# Patient Record
Sex: MALE | Race: BLACK OR AFRICAN AMERICAN | Employment: UNEMPLOYED
[De-identification: names, ages, dates, MRNs, and addresses within clinical notes are randomized per-mention and may not be internally consistent; named-entity substitution may affect disease eponyms.]

---

## 2017-01-24 ENCOUNTER — OFFICE VISIT (OUTPATIENT)
Dept: INTERNAL MEDICINE | Facility: CLINIC | Age: 52
End: 2017-01-24

## 2017-01-24 VITALS
HEART RATE: 108 BPM | SYSTOLIC BLOOD PRESSURE: 126 MMHG | WEIGHT: 220 LBS | RESPIRATION RATE: 20 BRPM | BODY MASS INDEX: 27.35 KG/M2 | HEIGHT: 75 IN | DIASTOLIC BLOOD PRESSURE: 76 MMHG

## 2017-01-24 DIAGNOSIS — F25.9 SCHIZOAFFECTIVE DISORDER, UNSPECIFIED TYPE (HCC): ICD-10-CM

## 2017-01-24 DIAGNOSIS — Z13.9 SCREENING: ICD-10-CM

## 2017-01-24 DIAGNOSIS — Z72.0 TOBACCO ABUSE: ICD-10-CM

## 2017-01-24 DIAGNOSIS — Z79.4 TYPE 2 DIABETES MELLITUS WITH HYPERGLYCEMIA, WITH LONG-TERM CURRENT USE OF INSULIN (HCC): ICD-10-CM

## 2017-01-24 DIAGNOSIS — E11.65 TYPE 2 DIABETES MELLITUS WITH HYPERGLYCEMIA, WITH LONG-TERM CURRENT USE OF INSULIN (HCC): ICD-10-CM

## 2017-01-24 DIAGNOSIS — Z76.89 ENCOUNTER TO ESTABLISH CARE: Primary | ICD-10-CM

## 2017-01-24 DIAGNOSIS — E78.5 HYPERLIPIDEMIA, UNSPECIFIED HYPERLIPIDEMIA TYPE: ICD-10-CM

## 2017-01-24 LAB — GLUCOSE BLD-MCNC: 232 MG/DL

## 2017-01-24 PROCEDURE — G8427 DOCREV CUR MEDS BY ELIG CLIN: HCPCS | Performed by: INTERNAL MEDICINE

## 2017-01-24 PROCEDURE — G8419 CALC BMI OUT NRM PARAM NOF/U: HCPCS | Performed by: INTERNAL MEDICINE

## 2017-01-24 PROCEDURE — G8484 FLU IMMUNIZE NO ADMIN: HCPCS | Performed by: INTERNAL MEDICINE

## 2017-01-24 PROCEDURE — 99203 OFFICE O/P NEW LOW 30 MIN: CPT | Performed by: INTERNAL MEDICINE

## 2017-01-24 PROCEDURE — 3046F HEMOGLOBIN A1C LEVEL >9.0%: CPT | Performed by: INTERNAL MEDICINE

## 2017-01-24 PROCEDURE — 1111F DSCHRG MED/CURRENT MED MERGE: CPT | Performed by: INTERNAL MEDICINE

## 2017-01-24 PROCEDURE — 4004F PT TOBACCO SCREEN RCVD TLK: CPT | Performed by: INTERNAL MEDICINE

## 2017-01-24 RX ORDER — CITALOPRAM 20 MG/1
20 TABLET ORAL DAILY
Qty: 30 TABLET | Refills: 0 | Status: SHIPPED | OUTPATIENT
Start: 2017-01-24

## 2017-01-24 RX ORDER — INSULIN GLARGINE 100 [IU]/ML
50 INJECTION, SOLUTION SUBCUTANEOUS EVERY MORNING
Qty: 1 VIAL | Refills: 3 | Status: SHIPPED | OUTPATIENT
Start: 2017-01-24

## 2017-01-24 RX ORDER — INSULIN GLARGINE 100 [IU]/ML
35 INJECTION, SOLUTION SUBCUTANEOUS NIGHTLY
Qty: 1 VIAL | Refills: 3 | Status: SHIPPED | OUTPATIENT
Start: 2017-01-24

## 2017-01-24 RX ORDER — SIMVASTATIN 20 MG
20 TABLET ORAL EVERY EVENING
Qty: 30 TABLET | Refills: 3 | Status: SHIPPED | OUTPATIENT
Start: 2017-01-24

## 2017-01-24 RX ORDER — ASPIRIN 81 MG/1
81 TABLET ORAL DAILY
Qty: 30 TABLET | Refills: 3 | Status: SHIPPED | OUTPATIENT
Start: 2017-01-24

## 2017-01-24 RX ORDER — OLANZAPINE 10 MG/1
10 TABLET ORAL NIGHTLY
Qty: 30 TABLET | Refills: 0 | Status: SHIPPED | OUTPATIENT
Start: 2017-01-24

## 2017-01-24 RX ORDER — OLANZAPINE 5 MG/1
5 TABLET ORAL DAILY
Qty: 30 TABLET | Refills: 0 | Status: SHIPPED | OUTPATIENT
Start: 2017-01-24

## 2017-01-24 RX ORDER — NICOTINE 21 MG/24HR
1 PATCH, TRANSDERMAL 24 HOURS TRANSDERMAL EVERY 24 HOURS
Qty: 30 PATCH | Refills: 3 | Status: SHIPPED | OUTPATIENT
Start: 2017-01-24 | End: 2018-01-24

## 2017-01-24 ASSESSMENT — PATIENT HEALTH QUESTIONNAIRE - PHQ9
SUM OF ALL RESPONSES TO PHQ QUESTIONS 1-9: 0
2. FEELING DOWN, DEPRESSED OR HOPELESS: 0
SUM OF ALL RESPONSES TO PHQ9 QUESTIONS 1 & 2: 0
1. LITTLE INTEREST OR PLEASURE IN DOING THINGS: 0

## 2017-02-08 ENCOUNTER — OFFICE VISIT (OUTPATIENT)
Dept: BEHAVIORAL/MENTAL HEALTH | Facility: CLINIC | Age: 52
End: 2017-02-08

## 2017-02-08 DIAGNOSIS — F25.9 SCHIZOAFFECTIVE DISORDER, UNSPECIFIED TYPE (HCC): Primary | ICD-10-CM

## 2017-02-08 PROCEDURE — 90791 PSYCH DIAGNOSTIC EVALUATION: CPT | Performed by: PSYCHOLOGIST

## 2017-02-27 ENCOUNTER — OFFICE VISIT (OUTPATIENT)
Dept: BEHAVIORAL/MENTAL HEALTH | Facility: CLINIC | Age: 52
End: 2017-02-27

## 2017-02-27 DIAGNOSIS — F25.9 SCHIZOAFFECTIVE DISORDER, UNSPECIFIED TYPE (HCC): Primary | ICD-10-CM

## 2017-02-27 PROCEDURE — 90832 PSYTX W PT 30 MINUTES: CPT | Performed by: PSYCHOLOGIST

## 2018-11-19 ENCOUNTER — TELEPHONE (OUTPATIENT)
Dept: INTERNAL MEDICINE | Age: 53
End: 2018-11-19

## 2021-07-06 ENCOUNTER — HOSPITAL ENCOUNTER (EMERGENCY)
Age: 56
Discharge: HOME | End: 2021-07-06
Attending: EMERGENCY MEDICINE

## 2021-07-06 ENCOUNTER — APPOINTMENT (OUTPATIENT)
Dept: GENERAL RADIOLOGY | Age: 56
End: 2021-07-06

## 2021-07-06 VITALS
HEIGHT: 73 IN | SYSTOLIC BLOOD PRESSURE: 147 MMHG | OXYGEN SATURATION: 97 % | HEART RATE: 94 BPM | DIASTOLIC BLOOD PRESSURE: 92 MMHG | TEMPERATURE: 98.3 F | RESPIRATION RATE: 16 BRPM | BODY MASS INDEX: 31.25 KG/M2 | WEIGHT: 235.8 LBS

## 2021-07-06 DIAGNOSIS — M79.671 RIGHT FOOT PAIN: Primary | ICD-10-CM

## 2021-07-06 DIAGNOSIS — R52 PAIN: ICD-10-CM

## 2021-07-06 PROCEDURE — 6370000000 HC RX 637 (ALT 250 FOR IP): Performed by: EMERGENCY MEDICINE

## 2021-07-06 PROCEDURE — 73630 X-RAY EXAM OF FOOT: CPT

## 2021-07-06 PROCEDURE — 99283 EMERGENCY DEPT VISIT LOW MDM: CPT

## 2021-07-06 RX ORDER — IBUPROFEN 800 MG/1
800 TABLET ORAL ONCE
Status: COMPLETED | OUTPATIENT
Start: 2021-07-06 | End: 2021-07-06

## 2021-07-06 RX ORDER — METOCLOPRAMIDE 10 MG/1
10 TABLET ORAL ONCE
Status: DISCONTINUED | OUTPATIENT
Start: 2021-07-06 | End: 2021-07-06

## 2021-07-06 RX ORDER — ONDANSETRON 4 MG/1
4 TABLET, ORALLY DISINTEGRATING ORAL ONCE
Status: DISCONTINUED | OUTPATIENT
Start: 2021-07-06 | End: 2021-07-06

## 2021-07-06 RX ORDER — DIPHENHYDRAMINE HCL 25 MG
25 TABLET ORAL ONCE
Status: DISCONTINUED | OUTPATIENT
Start: 2021-07-06 | End: 2021-07-06

## 2021-07-06 RX ORDER — KETOROLAC TROMETHAMINE 30 MG/ML
60 INJECTION, SOLUTION INTRAMUSCULAR; INTRAVENOUS ONCE
Status: DISCONTINUED | OUTPATIENT
Start: 2021-07-06 | End: 2021-07-06

## 2021-07-06 RX ADMIN — IBUPROFEN 800 MG: 800 TABLET, FILM COATED ORAL at 05:59

## 2021-07-06 ASSESSMENT — PAIN SCALES - GENERAL
PAINLEVEL_OUTOF10: 8
PAINLEVEL_OUTOF10: 8

## 2021-07-06 NOTE — ED NOTES
Pt presents to the er c/o right great toe and right second toe pain with no known injury for three months pt right great toe and second right toe are with dry scally skin      Bertha Lance RN  07/06/21 1778

## 2021-07-06 NOTE — ED PROVIDER NOTES
EMERGENCY DEPARTMENT ENCOUNTER    Pt Name: Sofy Siu  MRN: [id number]  [place] 1965  Date of evaluation: 7/6/21  CHIEF COMPLAINT       Chief Complaint   Patient presents with    Foot Pain     right     HISTORY OF PRESENT ILLNESS   Patient is a 53-year-old male who presents to the ED complaining of toe pain x3 months. Pain located medial side of second toe left foot. No trauma. Pain described as tolerable, 3/10. No other issues at this time. No fever, cough, shortness of breath, chest pain. REVIEW OF SYSTEMS     Review of Systems   All other systems reviewed and are negative.     PASTMEDICAL HISTORY     Past Medical History:   Diagnosis Date    Acute kidney injury ([facility] [address]     Type 2 diabetes mellitus with hyperosmolar nonketotic hyperglycemia ([facility] [address] 10/2/2016     SURGICAL HISTORY       Past Surgical History:   Procedure Laterality Date    ARM SURGERY       CURRENT MEDICATIONS       Discharge Medication List as of 7/6/2021  5:24 AM      CONTINUE these medications which have NOT CHANGED    Details   insulin aspart (NOVOLOG FLEXPEN) 100 UNIT/ML injection pen Inject 0-18 Units into the skin 3 times daily (before meals), Disp-5 Pen, R-3      !! insulin glargine (LANTUS) 100 UNIT/ML injection vial Inject 50 Units into the skin every morning, Disp-1 vial, R-3      simvastatin (ZOCOR) 20 MG tablet Take 1 tablet by mouth every evening, Disp-30 tablet, R-3      aspirin EC 81 MG EC tablet Take 1 tablet by mouth daily, Disp-30 tablet, R-3      citalopram (CELEXA) 20 MG tablet Take 1 tablet by mouth daily, Disp-30 tablet, R-0      !! OLANZapine (ZYPREXA) 10 MG tablet Take 1 tablet by mouth nightly, Disp-30 tablet, R-0      !! OLANZapine (ZYPREXA) 5 MG tablet Take 1 tablet by mouth daily, Disp-30 tablet, R-0      !! insulin glargine (LANTUS) 100 UNIT/ML injection vial Inject 35 Units into the skin nightly, Disp-1 vial, R-3      nicotine (NICODERM CQ) 14 MG/24HR Place 1 patch onto the skin every 24 hours, Disp-30 patch, R-3      metFORMIN (GLUCOPHAGE) 1000 MG tablet Take 1 tablet by mouth 2 times daily (with meals), Disp-60 tablet, R-3       !! - Potential duplicate medications found. Please discuss with provider. ALLERGIES     has No Known Allergies. FAMILY HISTORY     has no family status information on file. SOCIAL HISTORY       Social History     Tobacco Use    Smoking status: Current Every Day Smoker     Packs/day: 0.50     Types: Cigarettes    Smokeless tobacco: Never Used   Substance Use Topics    Alcohol use: No    Drug use: No     PHYSICAL EXAM     INITIAL VITALS: BP (!) 147/92   Pulse 94   Temp 98.3 °F (36.8 °C)   Resp 16   Ht 6' 1\" (1.854 m)   Wt 235 lb 12.8 oz (107 kg)   SpO2 97%   BMI 31.11 kg/m²    Physical Exam  HENT:      Head: Normocephalic. Right Ear: External ear normal.      Left Ear: External ear normal.      Nose: Nose normal.   Eyes:      Conjunctiva/sclera: Conjunctivae normal.   Cardiovascular:      Rate and Rhythm: Normal rate. Pulmonary:      Effort: Pulmonary effort is normal.   Abdominal:      General: Abdomen is flat. Skin:     General: Skin is dry. Comments: Hypertrophied area of skin medial aspect second toe right foot. Neurological:      Mental Status: He is alert. Mental status is at baseline. Psychiatric:         Mood and Affect: Mood normal.         Behavior: Behavior normal.         MEDICAL DECISION MAKING:   The patient is hemodynamically stable, afebrile, nontoxic-appearing. Physical exam notable for hypertrophied area of skin medial aspect second toe right foot without signs of trauma, skin breaks, infection  Based on history and exam likely chronic skin irritation from footwear.   ED plan for x-ray, ibuprofen, podiatry follow-up           DIAGNOSTIC RESULTS   EKG:All EKG's are interpreted by the Emergency Department Physician who either signs or Co-signs this chart in the absence of a cardiologist.        RADIOLOGY:All plain film, CT, MRI, and formal ultrasound images (except ED bedside ultrasound) are read by the radiologist, see reports below, unless otherwisenoted in MDM or here. XR FOOT RIGHT (MIN 3 VIEWS)   Final Result   1. Mild age-indeterminate lateral subluxation of the 1st metatarsal base. No   Lisfranc interval widening. If clinically indicated further evaluation could   be obtained with CT or MRI. 2. No fracture identified. LABS: All lab results were reviewed by myself, and all abnormals are listed below. Labs Reviewed - No data to display    EMERGENCY DEPARTMENTCOURSE:   Patient did well in the ED. X-ray unremarkable. Patient given referral to podiatry. Vitals:    Vitals:    07/06/21 0440   BP: (!) 147/92   Pulse: 94   Resp: 16   Temp: 98.3 °F (36.8 °C)   SpO2: 97%   Weight: 235 lb 12.8 oz (107 kg)   Height: 6' 1\" (1.854 m)       The patient was given the following medications while in the emergency department:  Orders Placed This Encounter   Medications    DISCONTD: ondansetron (ZOFRAN-ODT) disintegrating tablet 4 mg    DISCONTD: metoclopramide (REGLAN) tablet 10 mg    DISCONTD: diphenhydrAMINE (BENADRYL) tablet 25 mg    DISCONTD: ketorolac (TORADOL) injection 60 mg    ibuprofen (ADVIL;MOTRIN) tablet 800 mg     CONSULTS:  None    FINAL IMPRESSION      1. Right foot pain    2.  Pain          DISPOSITION/PLAN   DISPOSITION        PATIENT REFERRED TO:  Joann Heard, [facility] [address] [place] [address] ([facility])  [phone]    In 2 days      DISCHARGE MEDICATIONS:  Discharge Medication List as of 7/6/2021  5:24 AM        La Nena Ryan MD  Attending Emergency Physician                    Jearline Fleischer, MD  07/06/21 5290       Jearline Fleischer, MD  07/06/21 2798

## 2022-01-29 ENCOUNTER — HOSPITAL ENCOUNTER (EMERGENCY)
Age: 57
Discharge: HOME | End: 2022-01-29
Attending: EMERGENCY MEDICINE

## 2022-01-29 VITALS
SYSTOLIC BLOOD PRESSURE: 157 MMHG | HEART RATE: 114 BPM | RESPIRATION RATE: 20 BRPM | DIASTOLIC BLOOD PRESSURE: 87 MMHG | TEMPERATURE: 97.9 F | HEIGHT: 74 IN | OXYGEN SATURATION: 97 % | WEIGHT: 172 LBS | BODY MASS INDEX: 22.07 KG/M2

## 2022-01-29 DIAGNOSIS — K08.89 PAIN, DENTAL: Primary | ICD-10-CM

## 2022-01-29 PROCEDURE — 99283 EMERGENCY DEPT VISIT LOW MDM: CPT

## 2022-01-29 RX ORDER — IBUPROFEN 800 MG/1
800 TABLET ORAL EVERY 8 HOURS PRN
Qty: 20 TABLET | Refills: 0 | Status: SHIPPED | OUTPATIENT
Start: 2022-01-29

## 2022-01-29 RX ORDER — ACETAMINOPHEN AND CODEINE PHOSPHATE 300; 30 MG/1; MG/1
1 TABLET ORAL EVERY 6 HOURS PRN
Qty: 12 TABLET | Refills: 0 | Status: SHIPPED | OUTPATIENT
Start: 2022-01-29 | End: 2022-02-01

## 2022-01-29 RX ORDER — PENICILLIN V POTASSIUM 500 MG/1
500 TABLET ORAL 4 TIMES DAILY
Qty: 40 TABLET | Refills: 0 | Status: SHIPPED | OUTPATIENT
Start: 2022-01-29 | End: 2022-02-08

## 2022-01-29 ASSESSMENT — PAIN DESCRIPTION - LOCATION: LOCATION: TEETH

## 2022-01-29 ASSESSMENT — PAIN DESCRIPTION - ORIENTATION: ORIENTATION: RIGHT;UPPER

## 2022-01-29 ASSESSMENT — ENCOUNTER SYMPTOMS
FACIAL SWELLING: 0
TROUBLE SWALLOWING: 0

## 2022-01-29 ASSESSMENT — PAIN SCALES - GENERAL: PAINLEVEL_OUTOF10: 6

## 2022-01-29 NOTE — ED PROVIDER NOTES
[address] [place] [address] ED  EMERGENCY DEPARTMENT ENCOUNTER      Pt Name: Christy Felipe  MRN: [id number]  [place] 1965  Date of evaluation: 1/29/2022  Provider: Luanne Smith       Chief Complaint   Patient presents with    Dental Pain         HISTORY [place]  (Location/Symptom, Timing/Onset, Context/Setting, Quality, Duration, Modifying Factors, Severity.)   Christy Felipe is a 49 y.o. male who presents to the emergency department by private auto for evaluation of right upper dental pain that started a week ago. Patient states he used to have a dentist but does not have it anymore. Pain is 8 out of 10 described as an aching sensation. Denies fever or chills. Difficulty swallowing. Does have history of diabetes. Nursing Notes were reviewed.     PASTMEDICAL HISTORY     Past Medical History:   Diagnosis Date    Acute kidney [facility] ([facility] [address]     Type 2 diabetes mellitus with hyperosmolar nonketotic hyperglycemia ([facility] [address] 10/2/2016         SURGICAL HISTORY       Past Surgical History:   Procedure Laterality Date    ARM SURGERY           CURRENT MEDICATIONS     Previous Medications    ASPIRIN EC 81 MG EC TABLET    Take 1 tablet by mouth daily    CITALOPRAM (CELEXA) 20 MG TABLET    Take 1 tablet by mouth daily    INSULIN ASPART (NOVOLOG FLEXPEN) 100 UNIT/ML INJECTION PEN    Inject 0-18 Units into the skin 3 times daily (before meals)    INSULIN GLARGINE (LANTUS) 100 UNIT/ML INJECTION VIAL    Inject 50 Units into the skin every morning    INSULIN GLARGINE (LANTUS) 100 UNIT/ML INJECTION VIAL    Inject 35 Units into the skin nightly    METFORMIN (GLUCOPHAGE) 1000 MG TABLET    Take 1 tablet by mouth 2 times daily (with meals)    NICOTINE (NICODERM CQ) 14 MG/24HR    Place 1 patch onto the skin every 24 hours    OLANZAPINE (ZYPREXA) 10 MG TABLET    Take 1 tablet by mouth nightly    OLANZAPINE (ZYPREXA) 5 MG TABLET    Take 1 tablet by mouth daily    SIMVASTATIN (ZOCOR) 20 MG TABLET    Take 1 tablet by mouth every evening       ALLERGIES     Patient has no known allergies. FAMILY HISTORY     No family history on file. SOCIAL HISTORY       Social History     Socioeconomic History    Marital status:      Spouse name: Not on file    Number of children: Not on file    Years of education: Not on file    Highest education level: Not on file   Occupational History    Not on file   Tobacco Use    Smoking status: Current Every Day Smoker     Packs/day: 0.50     Types: Cigarettes    Smokeless tobacco: Never Used   Substance and Sexual Activity    Alcohol use: No    Drug use: No    Sexual activity: Not on file   Other Topics Concern    Not on file   Social History Narrative    Not on file     Social Determinants of Health     Financial Resource Strain:     Difficulty of Paying Living Expenses: Not on file   Food Insecurity:     Worried About Running Out of Food in the Last Year: Not on file    Daysi of Food in the Last Year: Not on file   Transportation Needs:     Lack of Transportation (Medical): Not on file    Lack of Transportation (Non-Medical):  Not on file   Physical Activity:     Days of Exercise per Week: Not on file    Minutes of Exercise per Session: Not on file   Stress:     Feeling of Stress : Not on file   Social Connections:     Frequency of Communication with Friends and Family: Not on file    Frequency of Social Gatherings with Friends and Family: Not on file    Attends [employer] Services: Not on file    Active Member of Clubs or Organizations: Not on file    Attends Club or Organization Meetings: Not on file    Marital Status: Not on file   Intimate Partner Violence:     Fear of Current or Ex-Partner: Not on file    Emotionally Abused: Not on file    Physically Abused: Not on file    Sexually Abused: Not on file   Housing Stability:     Unable to Pay for Housing in the Last Year: Not on file    Number of [place] in the Last normal.           DIAGNOSTIC RESULTS     EKG:All EKG's are interpreted by the Emergency Department Physician who either signs or Co-signs this chart in the absence of a cardiologist.        RADIOLOGY:   Non-plain film images such as CT, Ultrasound and MRI are read by theradiologist. Plain radiographic images are visualized and preliminarily interpreted by the emergency physician with the below findings:        Interpretation per the Radiologist below, if available at the time of this note:    No orders to display         EDBEDSIDE ULTRASOUND:   Performed by Kia Hernandez - none    LABS:  Labs Reviewed - No data to display    All other labs were within normal range or not returned as of this dictation. EMERGENCY DEPARTMENT COURSE andDIFFERENTIAL DIAGNOSIS/MDM:   Patient presents with dental issue as noted above assessment. No drooling. No dental abscess. No facial swelling. Prescriptions written for Tylenol with codeine, Motrin, and Pen-VK. OARRS reviewed. Given referral to dental clinic for follow-up. Return precautions provided. Vitals:    Vitals:    01/29/22 1458   BP: (!) 157/87   Pulse: 114   Resp: 20   Temp: 97.9 °F (36.6 °C)   TempSrc: Oral   SpO2: 97%   Weight: 172 lb (78 kg)   Height: 6' 2\" (1.88 m)         CONSULTS:  None    RES:  Procedures    FINAL IMPRESSION      1. Pain, dental          DISPOSITION/PLAN   DISPOSITION Decision To Discharge 01/29/2022 03:08:36 PM      PATIENT REFERRED TO:   ALETHA ABREU [facility]  [address], [address] [id number]  [phone]  Schedule an appointment as soon as possible for a visit       [facility] ED  [address] [facility]  [phone]    If symptoms worsen      DISCHARGE MEDICATIONS:     New Prescriptions    ACETAMINOPHEN-CODEINE (TYLENOL/CODEINE #3) 300-30 MG PER TABLET    Take 1 tablet by mouth every 6 hours as needed for Pain for up to 3 days. Intended supply: 3 days.  Take lowest dose possible to manage pain IBUPROFEN (ADVIL;MOTRIN) 800 MG TABLET    Take 1 tablet by mouth every 8 hours as needed for Pain    PENICILLIN V POTASSIUM (VEETID) 500 MG TABLET    Take 1 tablet by mouth 4 times daily for 10 days     Electronically signed by CATALINO Quiñonez 1/29/2022 at 3:11 PM           CATALINO Quiñonez CNP  01/29/22 1514

## 2022-01-29 NOTE — ED PROVIDER NOTES
eMERGENCY dEPARTMENT eNCOUnter   Independent Attestation     Pt Name: Alex Aranda  MRN: [id number]  [place] 1965  Date of evaluation: 1/29/22     Alex Aranda is a 49 y.o. male with CC: Dental Pain      This visit was performed by both a physician and an APC. I performed all aspects of the MDM as documented. The care is provided during an unprecedented national emergency due to the novel coronavirus, COVID 19.     Bill Colmenares DO  Attending Emergency Physician                    Eugenio Betancourt DO  01/29/22 8976

## 2022-09-19 ENCOUNTER — HOSPITAL ENCOUNTER (EMERGENCY)
Facility: CLINIC | Age: 57
Discharge: HOME | End: 2022-09-19
Attending: EMERGENCY MEDICINE

## 2022-09-19 VITALS
HEIGHT: 74 IN | OXYGEN SATURATION: 100 % | SYSTOLIC BLOOD PRESSURE: 139 MMHG | TEMPERATURE: 98.6 F | WEIGHT: 180 LBS | HEART RATE: 89 BPM | BODY MASS INDEX: 23.1 KG/M2 | DIASTOLIC BLOOD PRESSURE: 94 MMHG | RESPIRATION RATE: 16 BRPM

## 2022-09-19 DIAGNOSIS — F10.920 ACUTE ALCOHOLIC INTOXICATION WITHOUT COMPLICATION (HCC): Primary | ICD-10-CM

## 2022-09-19 DIAGNOSIS — E11.65 HYPERGLYCEMIA DUE TO DIABETES MELLITUS (HCC): ICD-10-CM

## 2022-09-19 DIAGNOSIS — Z91.14 NONCOMPLIANCE WITH MEDICATION REGIMEN: ICD-10-CM

## 2022-09-19 LAB
ABSOLUTE EOS #: 0.1 K/UL (ref 0–0.4)
ABSOLUTE LYMPH #: 1.8 K/UL (ref 1–4.8)
ABSOLUTE MONO #: 0.7 K/UL (ref 0.1–1.2)
ALBUMIN SERPL-MCNC: 4.2 G/DL (ref 3.5–5.2)
ALBUMIN/GLOBULIN RATIO: 1.8 (ref 1–2.5)
ALP BLD-CCNC: 98 U/L (ref 40–129)
ALT SERPL-CCNC: 36 U/L (ref 5–41)
ANION GAP SERPL CALCULATED.3IONS-SCNC: 12 MMOL/L (ref 9–17)
ANION GAP SERPL CALCULATED.3IONS-SCNC: 17 MMOL/L (ref 9–17)
AST SERPL-CCNC: 22 U/L
BASOPHILS # BLD: 1 % (ref 0–2)
BASOPHILS ABSOLUTE: 0 K/UL (ref 0–0.2)
BETA-HYDROXYBUTYRATE: 0.44 MMOL/L (ref 0.02–0.27)
BILIRUB SERPL-MCNC: 0.4 MG/DL (ref 0.3–1.2)
BUN BLDV-MCNC: 7 MG/DL (ref 6–20)
BUN BLDV-MCNC: 8 MG/DL (ref 6–20)
CALCIUM SERPL-MCNC: 8.9 MG/DL (ref 8.6–10.4)
CALCIUM SERPL-MCNC: 9.3 MG/DL (ref 8.6–10.4)
CHLORIDE BLD-SCNC: 101 MMOL/L (ref 98–107)
CHLORIDE BLD-SCNC: 91 MMOL/L (ref 98–107)
CHP ED QC CHECK: YES
CHP ED QC CHECK: YES
CO2: 20 MMOL/L (ref 20–31)
CO2: 23 MMOL/L (ref 20–31)
CREAT SERPL-MCNC: 0.6 MG/DL (ref 0.7–1.2)
CREAT SERPL-MCNC: 0.8 MG/DL (ref 0.7–1.2)
EOSINOPHILS RELATIVE PERCENT: 2 % (ref 1–4)
GFR AFRICAN AMERICAN: >60 ML/MIN
GFR AFRICAN AMERICAN: >60 ML/MIN
GFR NON-AFRICAN AMERICAN: >60 ML/MIN
GFR NON-AFRICAN AMERICAN: >60 ML/MIN
GFR SERPL CREATININE-BSD FRML MDRD: ABNORMAL ML/MIN/{1.73_M2}
GFR SERPL CREATININE-BSD FRML MDRD: ABNORMAL ML/MIN/{1.73_M2}
GLUCOSE BLD-MCNC: 284 MG/DL
GLUCOSE BLD-MCNC: 284 MG/DL (ref 75–110)
GLUCOSE BLD-MCNC: 350 MG/DL
GLUCOSE BLD-MCNC: 350 MG/DL (ref 75–110)
GLUCOSE BLD-MCNC: 361 MG/DL (ref 70–99)
GLUCOSE BLD-MCNC: 444 MG/DL (ref 75–110)
GLUCOSE BLD-MCNC: 717 MG/DL (ref 70–99)
HCT VFR BLD CALC: 39.4 % (ref 41–53)
HEMOGLOBIN: 13 G/DL (ref 13.5–17.5)
LYMPHOCYTES # BLD: 23 % (ref 24–44)
MCH RBC QN AUTO: 30.1 PG (ref 26–34)
MCHC RBC AUTO-ENTMCNC: 33.1 G/DL (ref 31–37)
MCV RBC AUTO: 91 FL (ref 80–100)
MONOCYTES # BLD: 9 % (ref 2–11)
PDW BLD-RTO: 13.8 % (ref 12.5–15.4)
PLATELET # BLD: 208 K/UL (ref 140–450)
PMV BLD AUTO: 8.3 FL (ref 6–12)
POTASSIUM SERPL-SCNC: 3.9 MMOL/L (ref 3.7–5.3)
POTASSIUM SERPL-SCNC: 4.4 MMOL/L (ref 3.7–5.3)
RBC # BLD: 4.33 M/UL (ref 4.5–5.9)
SEG NEUTROPHILS: 65 % (ref 36–66)
SEGMENTED NEUTROPHILS ABSOLUTE COUNT: 5.1 K/UL (ref 1.8–7.7)
SODIUM BLD-SCNC: 128 MMOL/L (ref 135–144)
SODIUM BLD-SCNC: 136 MMOL/L (ref 135–144)
TOTAL PROTEIN: 6.5 G/DL (ref 6.4–8.3)
WBC # BLD: 7.9 K/UL (ref 3.5–11)

## 2022-09-19 PROCEDURE — 6370000000 HC RX 637 (ALT 250 FOR IP): Performed by: EMERGENCY MEDICINE

## 2022-09-19 PROCEDURE — 85025 COMPLETE CBC W/AUTO DIFF WBC: CPT

## 2022-09-19 PROCEDURE — 82947 ASSAY GLUCOSE BLOOD QUANT: CPT

## 2022-09-19 PROCEDURE — 80053 COMPREHEN METABOLIC PANEL: CPT

## 2022-09-19 PROCEDURE — 82010 KETONE BODYS QUAN: CPT

## 2022-09-19 PROCEDURE — 96361 HYDRATE IV INFUSION ADD-ON: CPT

## 2022-09-19 PROCEDURE — 80048 BASIC METABOLIC PNL TOTAL CA: CPT

## 2022-09-19 PROCEDURE — 2580000003 HC RX 258: Performed by: EMERGENCY MEDICINE

## 2022-09-19 PROCEDURE — 96376 TX/PRO/DX INJ SAME DRUG ADON: CPT

## 2022-09-19 PROCEDURE — 99284 EMERGENCY DEPT VISIT MOD MDM: CPT

## 2022-09-19 PROCEDURE — 96374 THER/PROPH/DIAG INJ IV PUSH: CPT

## 2022-09-19 PROCEDURE — 36415 COLL VENOUS BLD VENIPUNCTURE: CPT

## 2022-09-19 PROCEDURE — 93005 ELECTROCARDIOGRAM TRACING: CPT | Performed by: EMERGENCY MEDICINE

## 2022-09-19 RX ORDER — 0.9 % SODIUM CHLORIDE 0.9 %
1000 INTRAVENOUS SOLUTION INTRAVENOUS ONCE
Status: COMPLETED | OUTPATIENT
Start: 2022-09-19 | End: 2022-09-19

## 2022-09-19 RX ADMIN — SODIUM CHLORIDE 1000 ML: 9 INJECTION, SOLUTION INTRAVENOUS at 21:22

## 2022-09-19 RX ADMIN — INSULIN HUMAN 4 UNITS: 100 INJECTION, SOLUTION PARENTERAL at 19:07

## 2022-09-19 RX ADMIN — SODIUM CHLORIDE 1000 ML: 9 INJECTION, SOLUTION INTRAVENOUS at 18:55

## 2022-09-19 RX ADMIN — Medication 8 UNITS: at 18:06

## 2022-09-19 RX ADMIN — SODIUM CHLORIDE 1000 ML: 9 INJECTION, SOLUTION INTRAVENOUS at 17:16

## 2022-09-19 ASSESSMENT — ENCOUNTER SYMPTOMS
SORE THROAT: 0
SHORTNESS OF BREATH: 0
DIARRHEA: 0
VOMITING: 0

## 2022-09-19 NOTE — ED PROVIDER NOTES
[facility] ED  [address] [facility]  Phone: [phone]        ADDENDUM:      Care of this patient was assumed from Dr. Tim Martinez. The patient was seen for Hyperglycemia  . The patient's initial evaluation and plan have been discussed with the prior provider who initially evaluated the patient. Nursing Notes, Past Medical Hx, Past Surgical Hx, Allergies, were all reviewed. PAST MEDICAL HISTORY    has a past medical history of Acute kidney injury ([facility] [address] and Type 2 diabetes mellitus with hyperosmolar nonketotic hyperglycemia ([facility] [address] SURGICAL HISTORY      has a past surgical history that includes Arm Surgery.     CURRENT MEDICATIONS       Discharge Medication List as of 9/19/2022 10:51 PM        CONTINUE these medications which have NOT CHANGED    Details   ibuprofen (ADVIL;MOTRIN) 800 MG tablet Take 1 tablet by mouth every 8 hours as needed for Pain, Disp-20 tablet, R-0Print      insulin aspart (NOVOLOG FLEXPEN) 100 UNIT/ML injection pen Inject 0-18 Units into the skin 3 times daily (before meals), Disp-5 Pen, R-3      !! insulin glargine (LANTUS) 100 UNIT/ML injection vial Inject 50 Units into the skin every morning, Disp-1 vial, R-3      simvastatin (ZOCOR) 20 MG tablet Take 1 tablet by mouth every evening, Disp-30 tablet, R-3      aspirin EC 81 MG EC tablet Take 1 tablet by mouth daily, Disp-30 tablet, R-3      citalopram (CELEXA) 20 MG tablet Take 1 tablet by mouth daily, Disp-30 tablet, R-0      !! OLANZapine (ZYPREXA) 10 MG tablet Take 1 tablet by mouth nightly, Disp-30 tablet, R-0      !! OLANZapine (ZYPREXA) 5 MG tablet Take 1 tablet by mouth daily, Disp-30 tablet, R-0      !! insulin glargine (LANTUS) 100 UNIT/ML injection vial Inject 35 Units into the skin nightly, Disp-1 vial, R-3      nicotine (NICODERM CQ) 14 MG/24HR Place 1 patch onto the skin every 24 hours, Disp-30 patch, R-3      metFORMIN (GLUCOPHAGE) 1000 MG tablet Take 1 tablet by mouth 2 times daily (with meals), Disp-60 tablet, R-3       !! - Potential duplicate medications found. Please discuss with provider. ALLERGIES     has No Known Allergies.       Diagnostic Results     LABS:   Results for orders placed or performed during the hospital encounter of 09/19/22   CBC with Auto Differential   Result Value Ref Range    WBC 7.9 3.5 - 11.0 k/uL    RBC 4.33 (L) 4.5 - 5.9 m/uL    Hemoglobin 13.0 (L) 13.5 - 17.5 g/dL    Hematocrit 39.4 (L) 41 - 53 %    MCV 91.0 80 - 100 fL    MCH 30.1 26 - 34 pg    MCHC 33.1 31 - 37 g/dL    RDW 13.8 12.5 - 15.4 %    Platelets 943 270 - 431 k/uL    MPV 8.3 6.0 - 12.0 fL    Seg Neutrophils 65 36 - 66 %    Lymphocytes 23 (L) 24 - 44 %    Monocytes 9 2 - 11 %    Eosinophils % 2 1 - 4 %    Basophils 1 0 - 2 %    Segs Absolute 5.10 1.8 - 7.7 k/uL    Absolute Lymph # 1.80 1.0 - 4.8 k/uL    Absolute Mono # 0.70 0.1 - 1.2 k/uL    Absolute Eos # 0.10 0.0 - 0.4 k/uL    Basophils Absolute 0.00 0.0 - 0.2 k/uL   Comprehensive Metabolic Panel   Result Value Ref Range    Glucose 717 (HH) 70 - 99 mg/dL    BUN 8 6 - 20 mg/dL    Creatinine 0.80 0.70 - 1.20 mg/dL    Calcium 9.3 8.6 - 10.4 mg/dL    Sodium 128 (L) 135 - 144 mmol/L    Potassium 4.4 3.7 - 5.3 mmol/L    Chloride 91 (L) 98 - 107 mmol/L    CO2 20 20 - 31 mmol/L    Anion Gap 17 9 - 17 mmol/L    Alkaline Phosphatase 98 40 - 129 U/L    ALT 36 5 - 41 U/L    AST 22 <40 U/L    Total Bilirubin 0.4 0.3 - 1.2 mg/dL    Total Protein 6.5 6.4 - 8.3 g/dL    Albumin 4.2 3.5 - 5.2 g/dL    Albumin/Globulin Ratio 1.8 1.0 - 2.5    GFR Non-African American >60 >60 mL/min    GFR African American >60 >60 mL/min    GFR Comment         Beta-Hydroxybutyrate   Result Value Ref Range    Beta-Hydroxybutyrate 0.44 (H) 0.02 - 0.27 mmol/L   Basic Metabolic Panel   Result Value Ref Range    Glucose 361 (H) 70 - 99 mg/dL    BUN 7 6 - 20 mg/dL    Creatinine 0.60 (L) 0.70 - 1.20 mg/dL    Calcium 8.9 8.6 - 10.4 mg/dL    Sodium 136 135 - 144 mmol/L    Potassium 3.9 3.7 - 5.3 mmol/L Chloride 101 98 - 107 mmol/L    CO2 23 20 - 31 mmol/L    Anion Gap 12 9 - 17 mmol/L    GFR Non-African American >60 >60 mL/min    GFR African American >60 >60 mL/min    GFR Comment         POCT Glucose   Result Value Ref Range    Glucose 350 mg/dL    QC OK? yes    POC Glucose Fingerstick   Result Value Ref Range    POC Glucose 444 (HH) 75 - 110 mg/dL   POC Glucose Fingerstick   Result Value Ref Range    POC Glucose 350 (H) 75 - 110 mg/dL   POCT Glucose   Result Value Ref Range    Glucose 284 mg/dL    QC OK? yes    POC Glucose Fingerstick   Result Value Ref Range    POC Glucose 284 (H) 75 - 110 mg/dL       RADIOLOGY:  No orders to display       RECENT VITALS:  BP: (!) 139/94, Temp: 98.6 °F (37 °C), Heart Rate: 89, Resp: 16     ED Course     The patient was given the following medications:  Orders Placed This Encounter   Medications    0.9 % sodium chloride bolus    insulin regular (HUMULIN R;NOVOLIN R) injection 8 Units    0.9 % sodium chloride bolus    insulin regular (HUMULIN R;NOVOLIN R) injection 4 Units    0.9 % sodium chloride bolus       Medical Decision Making      ED Course as of 09/20/22 0010   Mon Sep 19, 2022   1742 Patient CBC is grossly unremarkable, his beta hydroxybutyrate is only minimally elevated at 0.44. CMP is pending, the glucose is pending on it, however, his sodium is 128 so I suspect this could be hyperglycemia with pseudohyponatremia. Have started with fluids and I will order some insulin [TS]   4352 Twelve lead EKG interpreted by myself:  A 12 lead EKG done at 1722, interpreted by myself, showed a regular rhythm at a rate of 96bpm.  The NE interval was normal.  The QRS complex was normal.  There was no ST segment elevation or depression, T wave inversion not present. QRS progression through precordial leads was slightly delayed. Interpretation: Normal sinus rhythm, no ST segment changes, no pattern consistent with acute ischemia or infarct.  [TS]   8292 Blood glucose 444, continuing to monitor [TS]   1911 Patient signed out to oncoming physician pending repeat labs and reevaluation [TS]      ED Course User Index  [TS] Yumi Gaspar DO       The patient is a 60-year-old male who presents for evaluation after he was found acutely intoxicated and sleeping at work. The patient is a type II diabetic but is noncompliant with his insulin or other diabetic medications. He admits to taking himself off all medications approximately 1 month ago. He has refills at his pharmacy. Blood sugar upon arrival was 717. He was treated with IV fluids and insulin and his blood sugar steadily improved to 284. Initial sodium was slightly hyponatremic but on subsequent BMP his labs have returned to normal.  I suspect the patient's symptoms are secondary to medication noncompliance and acute alcohol intoxication. He is now clinically sober. He is able to ambulate without difficulty. He is alert and oriented x3 and has no focal neurologic deficits. At this time I feel that he is stable for discharge. He was instructed to stop drinking alcohol and to start taking his medications as prescribed. He was instructed to follow-up with the PCP in 1 to 2 days and to return to the ER for worsening symptoms or any other concern. The patient understands that at this time there is no evidence for a more malignant underlying process, but also understands that early in the process of an illness or injury, an emergency department work-up can be falsely reassuring. Routine discharge counseling was given, and the patient understands that worsening, changing or persistent symptoms should prompt a immediate call or follow-up with their primary care physician or return to the emergency department. The importance of appropriate follow-up was also discussed. I have reviewed the disposition diagnosis with the patient. I have answered their questions and given discharge instructions.   They voiced understanding of these instructions and did not have any further questions or complaints. Disposition     FINAL IMPRESSION      1. Acute alcoholic intoxication without complication ([facility] [address]    2. Hyperglycemia due to diabetes mellitus ([facility] [address]    3.  Noncompliance with medication regimen          DISPOSITION/PLAN   DISPOSITION Decision To Discharge 09/19/2022 10:49:45 PM      CONDITION ON DISPOSITION:   Stable    PATIENT REFERRED TO:  Alis Suarez MD  [place] [address]  [phone]    Schedule an appointment as soon as possible for a visit in 1 day      [facility] ED  [address] [place] [id number]  [phone]  Go to   If symptoms worsen    DISCHARGE MEDICATIONS:  Discharge Medication List as of 9/19/2022 10:51 PM                (Please note that portions of this note were completed with a voice recognition program.  Efforts were made to edit the dictations but occasionally words are mis-transcribed.)    Marissa Montgomery DO  Emergency Medicine Physician                  Marissa Montgmoery DO  09/20/22 0010

## 2022-09-19 NOTE — ED PROVIDER NOTES
Physical Exam  Vitals and nursing note reviewed. Constitutional:       General: He is not in acute distress. Appearance: Normal appearance. He is not ill-appearing or toxic-appearing. HENT:      Head: Normocephalic and atraumatic. Nose: Nose normal. No congestion. Mouth/Throat:      Mouth: Mucous membranes are dry. Eyes:      General:         Right eye: No discharge. Left eye: No discharge. Conjunctiva/sclera: Conjunctivae normal.   Cardiovascular:      Rate and Rhythm: Regular rhythm. Tachycardia present. Pulses: Normal pulses. Heart sounds: Normal heart sounds. No murmur heard. Comments: Slight tachycardia  Pulmonary:      Effort: Pulmonary effort is normal. No respiratory distress. Breath sounds: Normal breath sounds. No wheezing. Abdominal:      General: Abdomen is flat. There is no distension. Palpations: Abdomen is soft. There is no pulsatile mass. Tenderness: There is no abdominal tenderness. There is no guarding or rebound. Hernia: A hernia is present. Comments: No pulsatile mass. There is a ventral wall hernia/mass that is quite large and palpable   Musculoskeletal:         General: No deformity or signs of injury. Normal range of motion. Cervical back: Normal range of motion. Skin:     General: Skin is warm and dry. Capillary Refill: Capillary refill takes less than 2 seconds. Findings: No rash. Neurological:      General: No focal deficit present. Mental Status: He is alert and oriented to person, place, and time. Mental status is at baseline. Sensory: No sensory deficit. Motor: No weakness. Comments: Speaking normally. No facial asymmetry. Moving all 4 extremities. Normal gait.     Psychiatric:         Mood and Affect: Mood normal.       EMERGENCY DEPARTMENT COURSE and DIFFERENTIAL DIAGNOSIS/MDM:   Vitals:    Vitals:    09/19/22 1653   BP: 127/77   Pulse: (!) 104   Resp: 16   Temp: 98.6 minimally elevated at 0.44. CMP is pending, the glucose is pending on it, however, his sodium is 128 so I suspect this could be hyperglycemia with pseudohyponatremia. Have started with fluids and I will order some insulin [TS]   1759 Twelve lead EKG interpreted by myself:  A 12 lead EKG done at 1722, interpreted by myself, showed a regular rhythm at a rate of 96bpm.  The CT interval was normal.  The QRS complex was normal.  There was no ST segment elevation or depression, T wave inversion not present. QRS progression through precordial leads was slightly delayed. Interpretation: Normal sinus rhythm, no ST segment changes, no pattern consistent with acute ischemia or infarct.  [TS]   4345 Blood glucose 444, continuing to monitor [TS]   1911 Patient signed out to oncoming physician pending repeat labs and reevaluation [TS]      ED Course User Index  [TS] Alfred Bowen DO                (Please note that portions of this note were completed with a voice recognition program.  Efforts were made to edit the dictations but occasionally words are mis-transcribed.)    Alfred Bowen DO,(electronically signed)  Board Certified Emergency Physician          Alfred Bowen DO  09/19/22 1911

## 2022-09-20 NOTE — DISCHARGE INSTRUCTIONS
Take your medication as indicated and prescribed. Check your blood sugar at minimum 2 - 3 times per day and write down the results to take to your physician    [address] for worsening symptoms, persistent elevated blood sugar, low blood sugar, or if you develop any concerning symptoms such as: high fever not relieved by acetaminophen (Tylenol) and/or ibuprofen (Motrin / Advil), chills, shortness of breath, chest pain, feeling of your heart fluttering or racing, persistent nausea and/or vomiting, vomiting up blood, blood in your stool, numbness, loss of consciousness, weakness or tingling in the arms or legs or change in color of the extremities, changes in mental status, persistent headache, blurry vision, loss of bladder / bowel control, unable to follow up with your physician, or other any other care or concern.

## 2022-09-20 NOTE — ED NOTES
Pt states he is homeless and does not have anyone to pick him up from here. Pt states he has his bike in the front.       Justen Carolina RN  09/19/22 2123

## 2022-09-20 NOTE — ED NOTES
Pt urinated on self while sleeping.  pts bed changed and paper scrubs provided     Jody Davila RN  09/19/22 3914

## 2022-09-21 LAB
EKG ATRIAL RATE: 96 BPM
EKG P AXIS: 50 DEGREES
EKG P-R INTERVAL: 126 MS
EKG Q-T INTERVAL: 344 MS
EKG QRS DURATION: 84 MS
EKG QTC CALCULATION (BAZETT): 434 MS
EKG R AXIS: 50 DEGREES
EKG T AXIS: 64 DEGREES
EKG VENTRICULAR RATE: 96 BPM

## 2022-11-25 ENCOUNTER — APPOINTMENT (OUTPATIENT)
Dept: GENERAL RADIOLOGY | Age: 57
DRG: 854 | End: 2022-11-25
Payer: MEDICAID

## 2022-11-25 ENCOUNTER — HOSPITAL ENCOUNTER (INPATIENT)
Age: 57
LOS: 5 days | Discharge: NURSING, REHAB OR LONG-TERM CARE | DRG: 854 | End: 2022-11-30
Attending: EMERGENCY MEDICINE | Admitting: STUDENT IN AN ORGANIZED HEALTH CARE EDUCATION/TRAINING PROGRAM
Payer: MEDICAID

## 2022-11-25 DIAGNOSIS — L03.115 CELLULITIS OF RIGHT FOOT: ICD-10-CM

## 2022-11-25 DIAGNOSIS — E78.5 HYPERLIPIDEMIA, UNSPECIFIED HYPERLIPIDEMIA TYPE: ICD-10-CM

## 2022-11-25 DIAGNOSIS — E11.65 TYPE 2 DIABETES MELLITUS WITH HYPERGLYCEMIA, WITH LONG-TERM CURRENT USE OF INSULIN (HCC): ICD-10-CM

## 2022-11-25 DIAGNOSIS — F25.0 SCHIZOAFFECTIVE DISORDER, BIPOLAR TYPE (HCC): ICD-10-CM

## 2022-11-25 DIAGNOSIS — M86.271 SUBACUTE OSTEOMYELITIS OF RIGHT FOOT (HCC): ICD-10-CM

## 2022-11-25 DIAGNOSIS — Z59.00 HOMELESS SINGLE PERSON: ICD-10-CM

## 2022-11-25 DIAGNOSIS — R73.9 HYPERGLYCEMIA: Primary | ICD-10-CM

## 2022-11-25 DIAGNOSIS — Z79.4 TYPE 2 DIABETES MELLITUS WITH HYPERGLYCEMIA, WITH LONG-TERM CURRENT USE OF INSULIN (HCC): ICD-10-CM

## 2022-11-25 PROBLEM — A41.9 SEPSIS (HCC): Status: ACTIVE | Noted: 2022-11-25

## 2022-11-25 LAB
ABSOLUTE EOS #: 0 K/UL (ref 0–0.44)
ABSOLUTE IMMATURE GRANULOCYTE: 0 K/UL (ref 0–0.3)
ABSOLUTE LYMPH #: 1.47 K/UL (ref 1.1–3.7)
ABSOLUTE MONO #: 1.63 K/UL (ref 0.1–1.2)
ANION GAP SERPL CALCULATED.3IONS-SCNC: 14 MMOL/L (ref 9–17)
BASOPHILS # BLD: 1 % (ref 0–2)
BASOPHILS ABSOLUTE: 0.16 K/UL (ref 0–0.2)
BUN BLDV-MCNC: 23 MG/DL (ref 6–20)
BUN/CREAT BLD: 23 (ref 9–20)
C-REACTIVE PROTEIN: 196.2 MG/L (ref 0–5)
CALCIUM SERPL-MCNC: 9.7 MG/DL (ref 8.6–10.4)
CHLORIDE BLD-SCNC: 83 MMOL/L (ref 98–107)
CO2: 25 MMOL/L (ref 20–31)
CREAT SERPL-MCNC: 1 MG/DL (ref 0.7–1.2)
EOSINOPHILS RELATIVE PERCENT: 0 % (ref 1–4)
GFR SERPL CREATININE-BSD FRML MDRD: >60 ML/MIN/1.73M2
GLUCOSE BLD-MCNC: 319 MG/DL (ref 75–110)
GLUCOSE BLD-MCNC: 338 MG/DL (ref 75–110)
GLUCOSE BLD-MCNC: 691 MG/DL (ref 70–99)
HCO3 VENOUS: 26.1 MMOL/L (ref 22–29)
HCO3 VENOUS: NORMAL MMOL/L (ref 22–29)
HCT VFR BLD CALC: 41.4 % (ref 40.7–50.3)
HEMOGLOBIN: 14 G/DL (ref 13–17)
IMMATURE GRANULOCYTES: 0 %
LACTIC ACID, SEPSIS: 0.7 MMOL/L (ref 0.5–1.9)
LACTIC ACID, SEPSIS: 0.8 MMOL/L (ref 0.5–1.9)
LACTIC ACID, SEPSIS: 1 MMOL/L (ref 0.5–1.9)
LACTIC ACID, SEPSIS: 1.5 MMOL/L (ref 0.5–1.9)
LYMPHOCYTES # BLD: 9 % (ref 24–43)
MCH RBC QN AUTO: 30 PG (ref 25.2–33.5)
MCHC RBC AUTO-ENTMCNC: 33.8 G/DL (ref 28.4–34.8)
MCV RBC AUTO: 88.7 FL (ref 82.6–102.9)
MONOCYTES # BLD: 10 % (ref 3–12)
NRBC AUTOMATED: 0 PER 100 WBC
O2 SAT, VEN: 100 % (ref 60–85)
O2 SAT, VEN: NORMAL % (ref 60–85)
PCO2, VEN: 37.6 MM HG (ref 41–51)
PCO2, VEN: NORMAL MM HG (ref 41–51)
PDW BLD-RTO: 11.9 % (ref 11.8–14.4)
PH VENOUS: 7.45 (ref 7.32–7.43)
PH VENOUS: NORMAL (ref 7.32–7.43)
PLATELET # BLD: 232 K/UL (ref 138–453)
PMV BLD AUTO: 10.8 FL (ref 8.1–13.5)
PO2, VEN: 203 MM HG (ref 30–50)
PO2, VEN: NORMAL MM HG (ref 30–50)
POSITIVE BASE EXCESS, VEN: 2 (ref 0–3)
POSITIVE BASE EXCESS, VEN: NORMAL (ref 0–3)
POTASSIUM SERPL-SCNC: 5.2 MMOL/L (ref 3.7–5.3)
RBC # BLD: 4.67 M/UL (ref 4.21–5.77)
SEDIMENTATION RATE, ERYTHROCYTE: 71 MM/HR (ref 0–20)
SEG NEUTROPHILS: 80 % (ref 36–65)
SEGMENTED NEUTROPHILS ABSOLUTE COUNT: 13.04 K/UL (ref 1.5–8.1)
SODIUM BLD-SCNC: 122 MMOL/L (ref 135–144)
URIC ACID: 4.7 MG/DL (ref 3.4–7)
WBC # BLD: 16.3 K/UL (ref 3.5–11.3)

## 2022-11-25 PROCEDURE — 6360000002 HC RX W HCPCS: Performed by: INTERNAL MEDICINE

## 2022-11-25 PROCEDURE — 85025 COMPLETE CBC W/AUTO DIFF WBC: CPT

## 2022-11-25 PROCEDURE — 96375 TX/PRO/DX INJ NEW DRUG ADDON: CPT

## 2022-11-25 PROCEDURE — 0QBQ0ZZ EXCISION OF RIGHT TOE PHALANX, OPEN APPROACH: ICD-10-PCS | Performed by: PODIATRIST

## 2022-11-25 PROCEDURE — 80048 BASIC METABOLIC PNL TOTAL CA: CPT

## 2022-11-25 PROCEDURE — 36415 COLL VENOUS BLD VENIPUNCTURE: CPT

## 2022-11-25 PROCEDURE — 2060000000 HC ICU INTERMEDIATE R&B

## 2022-11-25 PROCEDURE — 2580000003 HC RX 258: Performed by: INTERNAL MEDICINE

## 2022-11-25 PROCEDURE — 96365 THER/PROPH/DIAG IV INF INIT: CPT

## 2022-11-25 PROCEDURE — 2580000003 HC RX 258: Performed by: PHYSICIAN ASSISTANT

## 2022-11-25 PROCEDURE — 6370000000 HC RX 637 (ALT 250 FOR IP): Performed by: INTERNAL MEDICINE

## 2022-11-25 PROCEDURE — 82803 BLOOD GASES ANY COMBINATION: CPT

## 2022-11-25 PROCEDURE — 6360000002 HC RX W HCPCS: Performed by: PHYSICIAN ASSISTANT

## 2022-11-25 PROCEDURE — 87040 BLOOD CULTURE FOR BACTERIA: CPT

## 2022-11-25 PROCEDURE — 82947 ASSAY GLUCOSE BLOOD QUANT: CPT

## 2022-11-25 PROCEDURE — 84550 ASSAY OF BLOOD/URIC ACID: CPT

## 2022-11-25 PROCEDURE — 73630 X-RAY EXAM OF FOOT: CPT

## 2022-11-25 PROCEDURE — 93923 UPR/LXTR ART STDY 3+ LVLS: CPT

## 2022-11-25 PROCEDURE — 6370000000 HC RX 637 (ALT 250 FOR IP): Performed by: PHYSICIAN ASSISTANT

## 2022-11-25 PROCEDURE — 87075 CULTR BACTERIA EXCEPT BLOOD: CPT

## 2022-11-25 PROCEDURE — 87205 SMEAR GRAM STAIN: CPT

## 2022-11-25 PROCEDURE — 83605 ASSAY OF LACTIC ACID: CPT

## 2022-11-25 PROCEDURE — 87070 CULTURE OTHR SPECIMN AEROBIC: CPT

## 2022-11-25 PROCEDURE — 99285 EMERGENCY DEPT VISIT HI MDM: CPT

## 2022-11-25 PROCEDURE — 83036 HEMOGLOBIN GLYCOSYLATED A1C: CPT

## 2022-11-25 PROCEDURE — 96372 THER/PROPH/DIAG INJ SC/IM: CPT

## 2022-11-25 PROCEDURE — 86140 C-REACTIVE PROTEIN: CPT

## 2022-11-25 PROCEDURE — 85652 RBC SED RATE AUTOMATED: CPT

## 2022-11-25 PROCEDURE — 86403 PARTICLE AGGLUT ANTBDY SCRN: CPT

## 2022-11-25 RX ORDER — ENOXAPARIN SODIUM 100 MG/ML
40 INJECTION SUBCUTANEOUS DAILY
Status: DISCONTINUED | OUTPATIENT
Start: 2022-11-25 | End: 2022-11-30 | Stop reason: HOSPADM

## 2022-11-25 RX ORDER — SODIUM CHLORIDE 0.9 % (FLUSH) 0.9 %
5-40 SYRINGE (ML) INJECTION EVERY 12 HOURS SCHEDULED
Status: DISCONTINUED | OUTPATIENT
Start: 2022-11-25 | End: 2022-11-30 | Stop reason: HOSPADM

## 2022-11-25 RX ORDER — ACETAMINOPHEN 650 MG/1
650 SUPPOSITORY RECTAL EVERY 6 HOURS PRN
Status: DISCONTINUED | OUTPATIENT
Start: 2022-11-25 | End: 2022-11-30 | Stop reason: HOSPADM

## 2022-11-25 RX ORDER — FAMOTIDINE 20 MG/1
20 TABLET, FILM COATED ORAL 2 TIMES DAILY
Status: DISCONTINUED | OUTPATIENT
Start: 2022-11-25 | End: 2022-11-30 | Stop reason: HOSPADM

## 2022-11-25 RX ORDER — MORPHINE SULFATE 2 MG/ML
1 INJECTION, SOLUTION INTRAMUSCULAR; INTRAVENOUS
Status: DISCONTINUED | OUTPATIENT
Start: 2022-11-25 | End: 2022-11-30 | Stop reason: HOSPADM

## 2022-11-25 RX ORDER — INSULIN GLARGINE 100 [IU]/ML
35 INJECTION, SOLUTION SUBCUTANEOUS NIGHTLY
Status: DISCONTINUED | OUTPATIENT
Start: 2022-11-25 | End: 2022-11-27

## 2022-11-25 RX ORDER — INSULIN LISPRO 100 [IU]/ML
0-4 INJECTION, SOLUTION INTRAVENOUS; SUBCUTANEOUS NIGHTLY
Status: DISCONTINUED | OUTPATIENT
Start: 2022-11-25 | End: 2022-11-30 | Stop reason: HOSPADM

## 2022-11-25 RX ORDER — SODIUM CHLORIDE 9 MG/ML
INJECTION, SOLUTION INTRAVENOUS PRN
Status: DISCONTINUED | OUTPATIENT
Start: 2022-11-25 | End: 2022-11-30 | Stop reason: HOSPADM

## 2022-11-25 RX ORDER — 0.9 % SODIUM CHLORIDE 0.9 %
30 INTRAVENOUS SOLUTION INTRAVENOUS ONCE
Status: COMPLETED | OUTPATIENT
Start: 2022-11-25 | End: 2022-11-25

## 2022-11-25 RX ORDER — SODIUM CHLORIDE 0.9 % (FLUSH) 0.9 %
5-40 SYRINGE (ML) INJECTION EVERY 12 HOURS SCHEDULED
Status: DISCONTINUED | OUTPATIENT
Start: 2022-11-25 | End: 2022-11-30 | Stop reason: SDUPTHER

## 2022-11-25 RX ORDER — SODIUM CHLORIDE 9 MG/ML
INJECTION, SOLUTION INTRAVENOUS CONTINUOUS
Status: DISCONTINUED | OUTPATIENT
Start: 2022-11-25 | End: 2022-11-26

## 2022-11-25 RX ORDER — SODIUM CHLORIDE 9 MG/ML
INJECTION, SOLUTION INTRAVENOUS PRN
Status: DISCONTINUED | OUTPATIENT
Start: 2022-11-25 | End: 2022-11-30 | Stop reason: SDUPTHER

## 2022-11-25 RX ORDER — MORPHINE SULFATE 2 MG/ML
0.5 INJECTION, SOLUTION INTRAMUSCULAR; INTRAVENOUS
Status: DISCONTINUED | OUTPATIENT
Start: 2022-11-25 | End: 2022-11-30 | Stop reason: HOSPADM

## 2022-11-25 RX ORDER — SODIUM CHLORIDE 0.9 % (FLUSH) 0.9 %
5-40 SYRINGE (ML) INJECTION PRN
Status: DISCONTINUED | OUTPATIENT
Start: 2022-11-25 | End: 2022-11-30 | Stop reason: HOSPADM

## 2022-11-25 RX ORDER — SODIUM CHLORIDE 0.9 % (FLUSH) 0.9 %
5-40 SYRINGE (ML) INJECTION PRN
Status: DISCONTINUED | OUTPATIENT
Start: 2022-11-25 | End: 2022-11-30 | Stop reason: SDUPTHER

## 2022-11-25 RX ORDER — OXYCODONE HYDROCHLORIDE AND ACETAMINOPHEN 5; 325 MG/1; MG/1
2 TABLET ORAL EVERY 4 HOURS PRN
Status: DISCONTINUED | OUTPATIENT
Start: 2022-11-25 | End: 2022-11-30 | Stop reason: HOSPADM

## 2022-11-25 RX ORDER — 0.9 % SODIUM CHLORIDE 0.9 %
1000 INTRAVENOUS SOLUTION INTRAVENOUS ONCE
Status: COMPLETED | OUTPATIENT
Start: 2022-11-25 | End: 2022-11-25

## 2022-11-25 RX ORDER — OXYCODONE HYDROCHLORIDE AND ACETAMINOPHEN 5; 325 MG/1; MG/1
1 TABLET ORAL EVERY 4 HOURS PRN
Status: DISCONTINUED | OUTPATIENT
Start: 2022-11-25 | End: 2022-11-30 | Stop reason: HOSPADM

## 2022-11-25 RX ORDER — SODIUM CHLORIDE 9 MG/ML
INJECTION, SOLUTION INTRAVENOUS CONTINUOUS
Status: DISCONTINUED | OUTPATIENT
Start: 2022-11-25 | End: 2022-11-25

## 2022-11-25 RX ORDER — INSULIN LISPRO 100 [IU]/ML
0-16 INJECTION, SOLUTION INTRAVENOUS; SUBCUTANEOUS
Status: DISCONTINUED | OUTPATIENT
Start: 2022-11-25 | End: 2022-11-30 | Stop reason: HOSPADM

## 2022-11-25 RX ORDER — ASPIRIN 81 MG/1
81 TABLET ORAL DAILY
Status: DISCONTINUED | OUTPATIENT
Start: 2022-11-25 | End: 2022-11-30 | Stop reason: HOSPADM

## 2022-11-25 RX ORDER — ACETAMINOPHEN 325 MG/1
650 TABLET ORAL EVERY 6 HOURS PRN
Status: DISCONTINUED | OUTPATIENT
Start: 2022-11-25 | End: 2022-11-30 | Stop reason: HOSPADM

## 2022-11-25 RX ADMIN — SODIUM CHLORIDE: 9 INJECTION, SOLUTION INTRAVENOUS at 17:40

## 2022-11-25 RX ADMIN — CEFEPIME 2000 MG: 2 INJECTION, POWDER, FOR SOLUTION INTRAVENOUS at 19:38

## 2022-11-25 RX ADMIN — INSULIN HUMAN 4 UNITS: 100 INJECTION, SOLUTION PARENTERAL at 17:37

## 2022-11-25 RX ADMIN — SODIUM CHLORIDE 2994 ML: 9 INJECTION, SOLUTION INTRAVENOUS at 20:06

## 2022-11-25 RX ADMIN — FAMOTIDINE 20 MG: 20 TABLET, FILM COATED ORAL at 23:13

## 2022-11-25 RX ADMIN — INSULIN LISPRO 4 UNITS: 100 INJECTION, SOLUTION INTRAVENOUS; SUBCUTANEOUS at 23:27

## 2022-11-25 RX ADMIN — VANCOMYCIN HYDROCHLORIDE 2500 MG: 5 INJECTION, POWDER, LYOPHILIZED, FOR SOLUTION INTRAVENOUS at 15:13

## 2022-11-25 RX ADMIN — INSULIN GLARGINE 35 UNITS: 100 INJECTION, SOLUTION SUBCUTANEOUS at 23:27

## 2022-11-25 RX ADMIN — ENOXAPARIN SODIUM 40 MG: 100 INJECTION SUBCUTANEOUS at 23:14

## 2022-11-25 RX ADMIN — SODIUM CHLORIDE 1000 ML: 9 INJECTION, SOLUTION INTRAVENOUS at 15:12

## 2022-11-25 RX ADMIN — INSULIN HUMAN 10 UNITS: 100 INJECTION, SOLUTION PARENTERAL at 16:20

## 2022-11-25 RX ADMIN — ASPIRIN 81 MG: 81 TABLET, COATED ORAL at 23:13

## 2022-11-25 SDOH — ECONOMIC STABILITY - HOUSING INSECURITY: HOMELESSNESS UNSPECIFIED: Z59.00

## 2022-11-25 ASSESSMENT — ENCOUNTER SYMPTOMS
COLOR CHANGE: 1
GASTROINTESTINAL NEGATIVE: 1
EYES NEGATIVE: 1
RESPIRATORY NEGATIVE: 1

## 2022-11-25 ASSESSMENT — PAIN - FUNCTIONAL ASSESSMENT: PAIN_FUNCTIONAL_ASSESSMENT: 0-10

## 2022-11-25 ASSESSMENT — PAIN DESCRIPTION - DESCRIPTORS: DESCRIPTORS: ACHING

## 2022-11-25 NOTE — PROGRESS NOTES
Transitions of Care Pharmacy Service   Medication History Note      The patient does not currently take any prescription or OTC maintenance medications at home. I have updated the patient's home med list as described below. Source(s) of information: Patient/ Surescripts    Please review the ACTION REQUESTED section of this note below for any discrepancies on current hospital orders. I have made the following changes to the patient's home medication list:  Discontinued Advil  Novolog  Lantus  Zocor  Aspirin  Celexa  Zyprexa  Nicoderm  metformin     Other Notes All medications on his list were from his last admission to [facility] in 2017. Pt states he takes no prescription home medication. PROVIDER ACTION REQUESTED  Medications that need to be addressed by a physician/nurse practitioner:    Medication Action Requested        none         Please feel free to call me with any questions about this encounter. Thank you.     Kymberly Tucker [facility]  Transitions of Care Pharmacy Service  Phone: [phone]  Fax: [phone]    Electronically signed by Kymberly Tucker [facility] on 11/25/2022 at 5:32 PM

## 2022-11-25 NOTE — ED PROVIDER NOTES
[address] [place] [address] ED  [facility]      Pt Name: Mahendra Yanez  MRN: [id number]  [place] 1965  Date ofevaluation: 11/25/2022  Provider: Abril Newell Dr       Chief Complaint   Patient presents with    Toe Pain     \"Think I hit it on something a week or 2 ago\" hx diabetes and not taking prescribed metformin          HISTORY OF PRESENT ILLNESS  (Location/Symptom, Timing/Onset, Context/Setting, Quality, Duration, Modifying Factors, Severity.)   Mahendra Yanez is a 49 y.o. male who presents to the emergency department with right foot pain. States he might hit his foot on something 2 weeks ago. He is unsure. History diabetes but has not been taking his metformin and has been sparingly taking his insulin. He does seem to be noncompliant. No fevers or chills. No nausea or vomiting. No other complaints. Nursing Notes were reviewed. ALLERGIES     Patient has no known allergies. CURRENT MEDICATIONS       Previous Medications    No medications on file       PAST MEDICAL HISTORY         Diagnosis Date    Acute kidney injury ([facility] [address]     Type 2 diabetes mellitus with hyperosmolar nonketotic hyperglycemia ([facility] [address] 10/2/2016       SURGICAL HISTORY           Procedure Laterality Date    ARM SURGERY           HISTORY     History reviewed. No pertinent family history. No family status information on file. SOCIAL HISTORY      reports that he has been smoking cigarettes. He has been smoking an average of .5 packs per day. He has never used smokeless tobacco. He reports that he does not drink alcohol and does not use drugs. REVIEW OFSYSTEMS    (2-9 systems for level 4, 10 or more for level 5)   Review of Systems    Except as noted above the remainder of the review of systems was reviewed and negative.      PHYSICAL EXAM    (up to 7 for level 4, 8 or more for level 5)     ED Triage Vitals   BP Temp Temp Source Heart Rate Resp SpO2 Height Weight 11/25/22 1057 11/25/22 1056 11/25/22 1056 11/25/22 1056 11/25/22 1056 11/25/22 1056 11/25/22 1056 11/25/22 1056   (!) 138/95 98.1 °F (36.7 °C) Temporal (!) 115 18 97 % 6' 3\" (1.905 m) 220 lb (99.8 kg)     Physical Exam  Constitutional:       Appearance: He is well-developed. HENT:      Head: Normocephalic and atraumatic. Cardiovascular:      Rate and Rhythm: Normal rate and regular rhythm. Pulmonary:      Effort: Pulmonary effort is normal.      Breath sounds: Normal breath sounds. Abdominal:      Palpations: Abdomen is soft. Musculoskeletal:         General: Normal range of motion. Cervical back: Normal range of motion and neck supple. Feet:    Skin:     General: Skin is warm. Neurological:      Mental Status: He is alert and oriented to person, place, and time.    Psychiatric:         Behavior: Behavior normal.               DIAGNOSTIC RESULTS     EKG: All EKG's are interpreted by the Emergency Department Physician who either signs or Co-signs this chart in the absence of a cardiologist.        RADIOLOGY:   Non-plain film images such as CT, Ultrasound and MRI are read by the radiologist. Plain radiographic images arevisualized and preliminarily interpreted by the emergency physician with the below findings:        Interpretation per the Radiologist below, if available at thetime of this note:          ED BEDSIDE ULTRASOUND:   Performed by ED Physician - none    LABS:  Labs Reviewed   CBC WITH AUTO DIFFERENTIAL - Abnormal; Notable for the following components:       Result Value    WBC 16.3 (*)     Seg Neutrophils 80 (*)     Lymphocytes 9 (*)     Eosinophils % 0 (*)     Segs Absolute 13.04 (*)     Absolute Mono # 1.63 (*)     All other components within normal limits   BASIC METABOLIC PANEL - Abnormal; Notable for the following components:    Glucose 691 (*)     BUN 23 (*)     Bun/Cre Ratio 23 (*)     Sodium 122 (*)     Chloride 83 (*)     All other components within normal limits C-REACTIVE PROTEIN - Abnormal; Notable for the following components:    .2 (*)     All other components within normal limits   SEDIMENTATION RATE - Abnormal; Notable for the following components:    Sed Rate 71 (*)     All other components within normal limits   VENOUS BLOOD GAS, POINT OF CARE - Abnormal; Notable for the following components:    pH, Kareem 7.448 (*)     pCO2, Kareem 37.6 (*)     pO2, Kareem 203.0 (*)     O2 Sat, Kareem 100 (*)     All other components within normal limits   POC GLUCOSE FINGERSTICK - Abnormal; Notable for the following components:    POC Glucose 338 (*)     All other components within normal limits   CULTURE, BLOOD 1   CULTURE, BLOOD 2   CULTURE, ANAEROBIC AND AEROBIC   LACTATE, SEPSIS   LACTATE, SEPSIS   C-REACTIVE PROTEIN   SEDIMENTATION RATE   VENOUS BLOOD GAS, POINT OF CARE       All other labs were within normal range or not returned as of this dictation. EMERGENCY DEPARTMENT COURSE and DIFFERENTIAL DIAGNOSIS/MDM:   Vitals:    Vitals:    11/25/22 1500 11/25/22 1600 11/25/22 1603 11/25/22 1700   BP: 133/85 (!) 139/91  128/86   Pulse: (!) 111 (!) 107  (!) 118   Resp: 14 18  14   Temp:       TempSrc:       SpO2: 96%  95% 96%   Weight:       Height:         HEARTSCORE: Not indicated    4:07 PM EST  Podiatry derided the great toe area. Feels that admission is warranted for IV antibiotics and MRI OF foot. Will admit to medicine. No signs of DKA at this point time. Blood sugar improved after fluids and insulin. 6:00 PM EST  Discussed case with dr Willa Mccloud and admission was accepted. CONSULTS:  IP CONSULT TO PODIATRY  IP CONSULT TO HOSPITALIST  PHARMACY TO DOSE VANCOMYCIN  IP CONSULT TO PODIATRY    PROCEDURES:  Procedures        FINAL IMPRESSION      1. Hyperglycemia    2. Cellulitis of right foot          DISPOSITION/PLAN   DISPOSITION Admitted 11/25/2022 06:02:26 PM      PATIENTREFERRED TO:   No follow-up provider specified.     DISCHARGE MEDICATIONS:     New Prescriptions    No medications on file           (Please note that portions of this note were completed with a voice recognition program.  Efforts were made to edit thedictations but occasionally words are mis-transcribed.)    INGRID Alvarado PA-C  11/25/22 4414

## 2022-11-25 NOTE — CONSULTS
Consultation Note  Podiatric Medicine and Surgery     Subjective     Chief Complaint: Right foot wound with pain     HPI:  Penny De La Paz is a 49 y.o. male seen at [facility] ED by podiatry for a right hallux with associated pain. Patient states that he has been dealing with the right hallux wound for around 2 weeks and just recently developed pain and redness. He says that he has noticed increase drainage from the area and became increasing concerned for infection. He denies any systemic signs of infection including nausea, vomiting, fever or chills. He is a diabetic patient with blood sugars currently 691. Says that he does not take his medication. PCP is Matt Toribio MD    ROS:   Review of Systems   Constitutional: Negative. HENT: Negative. Eyes: Negative. Respiratory: Negative. Cardiovascular: Negative. Gastrointestinal: Negative. Musculoskeletal:  Positive for gait problem and myalgias. Skin:  Positive for color change and wound. Past Medical History   has a past medical history of Acute kidney injury ([facility] [address] and Type 2 diabetes mellitus with hyperosmolar nonketotic hyperglycemia ([facility] [address] Past Surgical History   has a past surgical history that includes Arm Surgery. Medications  Prior to Admission medications    Medication Sig Start Date End Date Taking?  Authorizing Provider   ibuprofen (ADVIL;MOTRIN) 800 MG tablet Take 1 tablet by mouth every 8 hours as needed for Pain 1/29/22   CATALINO Henry - CNP   insulin aspart (NOVOLOG FLEXPEN) 100 UNIT/ML injection pen Inject 0-18 Units into the skin 3 times daily (before meals) 1/24/17   Soila Jimenez MD   insulin glargine (LANTUS) 100 UNIT/ML injection vial Inject 50 Units into the skin every morning 1/24/17   Soila Jimenez MD   simvastatin (ZOCOR) 20 MG tablet Take 1 tablet by mouth every evening 1/24/17   Soila Jimenez MD   aspirin EC 81 MG EC tablet Take 1 tablet by mouth daily 1/24/17   Soila Jimenez MD   citalopram (CELEXA) 20 MG tablet Take 1 tablet by mouth daily 17   Aubrey Howell MD   OLANZapine (ZYPREXA) 10 MG tablet Take 1 tablet by mouth nightly 17   Aubrey Howell MD   OLANZapine (ZYPREXA) 5 MG tablet Take 1 tablet by mouth daily 17   Aubrey Howell MD   insulin glargine (LANTUS) 100 UNIT/ML injection vial Inject 35 Units into the skin nightly 17   Aubrey Howell MD   nicotine (NICODERM CQ) 14 MG/24HR Place 1 patch onto the skin every 24 hours 17  Aubrey Howell MD   metFORMIN (GLUCOPHAGE) 1000 MG tablet Take 1 tablet by mouth 2 times daily (with meals) 17   Aubrey Howell MD    Scheduled Meds:   sodium chloride flush  5-40 mL IntraVENous 2 times per day    cefepime  2,000 mg IntraVENous Once    sodium chloride  1,000 mL IntraVENous Once    vancomycin  2,500 mg IntraVENous Once     Continuous Infusions:   sodium chloride       PRN Meds:.sodium chloride flush, sodium chloride    Allergies  has No Known Allergies. Family History  family history is not on file. Social History   reports that he has been smoking cigarettes. He has been smoking an average of .5 packs per day. He has never used smokeless tobacco.   reports no history of alcohol use. reports no history of drug use.     Objective     Vitals:  Patient Vitals for the past 8 hrs:   BP Temp Temp src Pulse Resp SpO2 Height Weight   22 1500 133/85 -- -- (!) 111 14 96 % -- --   22 1457 136/87 -- -- -- -- -- -- --   22 1057 (!) 138/95 -- -- -- -- -- -- --   22 1056 -- 98.1 °F (36.7 °C) Temporal (!) 115 18 97 % 6' 3\" (1.905 m) 220 lb (99.8 kg)     Average, Min, and Max for last 24 hours Vitals:  TEMPERATURE:  Temp  Av.1 °F (36.7 °C)  Min: 98.1 °F (36.7 °C)  Max: 98.1 °F (36.7 °C)    RESPIRATIONS RANGE: Resp  Av  Min: 14  Max: 18    PULSE RANGE: Pulse  Av  Min: 111  Max: 115    BLOOD PRESSURE RANGE:  Systolic (07QCT), JOL:022 , Min:133 , IKV:981   ; Diastolic (85TZO), NTD:60, Min:85, Max:95      PULSE OXIMETRY RANGE: SpO2  Av.5 %  Min: 96 %  Max: 97 %  I&O:  No intake/output data recorded. CBC:  Recent Labs     22  1325   WBC 16.3*   HGB 14.0   HCT 41.4      .2*        BMP:  Recent Labs     22  1325   *   K 5.2   CL 83*   CO2 25   BUN 23*   CREATININE 1.00   GLUCOSE 691*   CALCIUM 9.7        Coags:  No results for input(s): APTT, PROT, INR in the last 72 hours. Lab Results   Component Value Date    LABA1C >16.8 (H) 2016     Lab Results   Component Value Date    SEDRATE 71 (H) 2022     Lab Results   Component Value Date    .2 (H) 2022         Lower Extremity Physical Exam:  Vascular: DP and PT pulses are lightly palpable. CFT <3 seconds to all digits. Hair growth is absent to the level of the digits. Edema noted to right foot. Neuro: Saph/sural/SP/DP/plantar sensation diminished to light touch. Musculoskeletal: Muscle strength is 4/5 to all lower extremity muscle groups. Gross deformity is absent. Dermatologic:  Erythema with increased warmth noted to dorsal aspect of patients right foot. Full thickness ulcer #1 located to the distal aspect of the right hallux. Base is Fibrogranular. Periwound skin is hyperkeratotic. Minimal purulent drainage noted with no associated mal odor. Erythema present with associated increase in warmth. Probes to bone and tracks dorsal laterally. No fluctuance, crepitus, or induration. Interdigital maceration absent. Clinical:                 Imaging:   XR FOOT RIGHT (MIN 3 VIEWS)   Final Result   1. Soft tissue ulceration/irregularity of the great toe. 2. Mild degenerative changes as detailed above. Pes planus. 3. No acute fracture or dislocation. 4. Mild plantar calcaneal spur.              Cultures:   Taken at bedside in the ED    Evan Ricks is a 49 y.o. male with   Diabetic Ulcer down to the level of bone, right hallux  Possible osteomyelitis, right hallux  Cellulitis, right foot  Uncontrolled type II DM with peripheral neuropathy. Active Problems:    * No active hospital problems. *  Resolved Problems:    * No resolved hospital problems. *        Plan     Patient examined and evaluated at bedside   Treatment options discussed in detail with the patient  Radiographs reviewed and discussed in detail with patient. Erosive changes noted to the distal aspect of the distal hallucal phalanx. Concerns for osteomyelitis. No abscess or soft tissue gas noted   Sharp excisional debridement performed of the right hallux wound. 100% of wound debrided. No anesthesia required. Hemostasis obtained with direct pressure. Patient reports 0/10 post procedure pain. Cultures taken at bedside. Dressing applied to right foot: Betadine wet to dry, Kerlix and tape. Due to patients high blood sugar and increased inflammatory markers, patient is to be admitted under IM for medical management. Glucose: 691  WBC:16.3   CRP: 196.2  ESR: 71  Patient is currently receiving Cefepime and Vanco. Order placed for ID for antibiotic management.  Appreciate recommendations  Order placed for NIVS to be performed  Order placed for right foot MRI to evaluate underlying bone for osteomyelitis   WBAT to Right lower extremity  Discussed with CHONG RosenbergM   Podiatric Medicine & Surgery   11/25/2022 at 3:24 PM

## 2022-11-25 NOTE — ED NOTES
Pt presents to ED with c/o bilateral toe pain. Pt states multiple toes on both feet hurt. Pt states he fell approximately 1 week ago and thinks he injured toes then. Pt is able to bear weight but it is painful.       Luisa Bull RN  11/25/22 6834

## 2022-11-25 NOTE — ED PROVIDER NOTES
eMERGENCY dEPARTMENT eNCOUnter   Independent Attestation     Pt Name: Mahendra Yanez  MRN: [id number]  [place] 1965  Date of evaluation: 11/25/22     Mahendra Yanez is a 49 y.o. male with CC: Toe Pain (\"Think I hit it on something a week or 2 ago\" hx diabetes and not taking prescribed metformin )        This visit was performed by both a physician and an APC. I performed all aspects of the MDM as documented. The care is provided during an unprecedented national emergency due to the novel coronavirus, COVID 19.     Hector Cervantes MD  Attending Emergency Physician          Olive Carpenter MD  11/25/22 025

## 2022-11-26 ENCOUNTER — APPOINTMENT (OUTPATIENT)
Dept: MRI IMAGING | Age: 57
DRG: 854 | End: 2022-11-26
Payer: MEDICAID

## 2022-11-26 PROBLEM — E87.1 HYPONATREMIA: Status: ACTIVE | Noted: 2022-11-26

## 2022-11-26 LAB
ALBUMIN SERPL-MCNC: 3.3 G/DL (ref 3.5–5.2)
ALP BLD-CCNC: 91 U/L (ref 40–129)
ALT SERPL-CCNC: 11 U/L (ref 5–41)
ANION GAP SERPL CALCULATED.3IONS-SCNC: 13 MMOL/L (ref 9–17)
AST SERPL-CCNC: 10 U/L
BILIRUB SERPL-MCNC: 0.5 MG/DL (ref 0.3–1.2)
BUN BLDV-MCNC: 10 MG/DL (ref 6–20)
BUN/CREAT BLD: 16 (ref 9–20)
C-REACTIVE PROTEIN: 178.9 MG/L (ref 0–5)
CALCIUM SERPL-MCNC: 8.9 MG/DL (ref 8.6–10.4)
CHLORIDE BLD-SCNC: 99 MMOL/L (ref 98–107)
CO2: 23 MMOL/L (ref 20–31)
CREAT SERPL-MCNC: 0.61 MG/DL (ref 0.7–1.2)
GFR SERPL CREATININE-BSD FRML MDRD: >60 ML/MIN/1.73M2
GLUCOSE BLD-MCNC: 215 MG/DL (ref 70–99)
GLUCOSE BLD-MCNC: 239 MG/DL (ref 75–110)
GLUCOSE BLD-MCNC: 248 MG/DL (ref 75–110)
GLUCOSE BLD-MCNC: 274 MG/DL (ref 75–110)
GLUCOSE BLD-MCNC: 297 MG/DL (ref 75–110)
INR BLD: 1
OSMOLALITY URINE: 742 MOSM/KG (ref 80–1300)
POTASSIUM SERPL-SCNC: 3.9 MMOL/L (ref 3.7–5.3)
PROCALCITONIN: 0.52 NG/ML
PROTHROMBIN TIME: 13.4 SEC (ref 11.5–14.2)
SEDIMENTATION RATE, ERYTHROCYTE: 68 MM/HR (ref 0–20)
SERUM OSMOLALITY: 289 MOSM/KG (ref 275–295)
SODIUM BLD-SCNC: 129 MMOL/L (ref 135–144)
SODIUM BLD-SCNC: 133 MMOL/L (ref 135–144)
SODIUM BLD-SCNC: 135 MMOL/L (ref 135–144)
SODIUM,UR: 91 MMOL/L
TOTAL PROTEIN: 6.1 G/DL (ref 6.4–8.3)
TROPONIN, HIGH SENSITIVITY: 17 NG/L (ref 0–22)
VANCOMYCIN RANDOM: 15.8 UG/ML

## 2022-11-26 PROCEDURE — 84145 PROCALCITONIN (PCT): CPT

## 2022-11-26 PROCEDURE — 85610 PROTHROMBIN TIME: CPT

## 2022-11-26 PROCEDURE — 6370000000 HC RX 637 (ALT 250 FOR IP): Performed by: INTERNAL MEDICINE

## 2022-11-26 PROCEDURE — 84295 ASSAY OF SERUM SODIUM: CPT

## 2022-11-26 PROCEDURE — 36415 COLL VENOUS BLD VENIPUNCTURE: CPT

## 2022-11-26 PROCEDURE — 73718 MRI LOWER EXTREMITY W/O DYE: CPT

## 2022-11-26 PROCEDURE — 85652 RBC SED RATE AUTOMATED: CPT

## 2022-11-26 PROCEDURE — 97166 OT EVAL MOD COMPLEX 45 MIN: CPT

## 2022-11-26 PROCEDURE — 97530 THERAPEUTIC ACTIVITIES: CPT

## 2022-11-26 PROCEDURE — 2060000000 HC ICU INTERMEDIATE R&B

## 2022-11-26 PROCEDURE — 6360000002 HC RX W HCPCS: Performed by: STUDENT IN AN ORGANIZED HEALTH CARE EDUCATION/TRAINING PROGRAM

## 2022-11-26 PROCEDURE — 2580000003 HC RX 258: Performed by: PHYSICIAN ASSISTANT

## 2022-11-26 PROCEDURE — 6360000002 HC RX W HCPCS: Performed by: INTERNAL MEDICINE

## 2022-11-26 PROCEDURE — 6370000000 HC RX 637 (ALT 250 FOR IP): Performed by: STUDENT IN AN ORGANIZED HEALTH CARE EDUCATION/TRAINING PROGRAM

## 2022-11-26 PROCEDURE — 97535 SELF CARE MNGMENT TRAINING: CPT

## 2022-11-26 PROCEDURE — 86140 C-REACTIVE PROTEIN: CPT

## 2022-11-26 PROCEDURE — 2580000003 HC RX 258: Performed by: STUDENT IN AN ORGANIZED HEALTH CARE EDUCATION/TRAINING PROGRAM

## 2022-11-26 PROCEDURE — 2580000003 HC RX 258: Performed by: NURSE PRACTITIONER

## 2022-11-26 PROCEDURE — 84300 ASSAY OF URINE SODIUM: CPT

## 2022-11-26 PROCEDURE — 80053 COMPREHEN METABOLIC PANEL: CPT

## 2022-11-26 PROCEDURE — 83036 HEMOGLOBIN GLYCOSYLATED A1C: CPT

## 2022-11-26 PROCEDURE — 2580000003 HC RX 258: Performed by: INTERNAL MEDICINE

## 2022-11-26 PROCEDURE — 83930 ASSAY OF BLOOD OSMOLALITY: CPT

## 2022-11-26 PROCEDURE — 84484 ASSAY OF TROPONIN QUANT: CPT

## 2022-11-26 PROCEDURE — 80202 ASSAY OF VANCOMYCIN: CPT

## 2022-11-26 PROCEDURE — 83935 ASSAY OF URINE OSMOLALITY: CPT

## 2022-11-26 PROCEDURE — 82947 ASSAY GLUCOSE BLOOD QUANT: CPT

## 2022-11-26 RX ORDER — SODIUM CHLORIDE 450 MG/100ML
INJECTION, SOLUTION INTRAVENOUS CONTINUOUS
Status: DISCONTINUED | OUTPATIENT
Start: 2022-11-26 | End: 2022-11-26

## 2022-11-26 RX ORDER — NICOTINE 21 MG/24HR
1 PATCH, TRANSDERMAL 24 HOURS TRANSDERMAL DAILY
Status: DISCONTINUED | OUTPATIENT
Start: 2022-11-26 | End: 2022-11-30 | Stop reason: HOSPADM

## 2022-11-26 RX ORDER — ROSUVASTATIN CALCIUM 40 MG/1
40 TABLET, COATED ORAL NIGHTLY
Status: DISCONTINUED | OUTPATIENT
Start: 2022-11-26 | End: 2022-11-30 | Stop reason: HOSPADM

## 2022-11-26 RX ORDER — OLANZAPINE 5 MG/1
5 TABLET ORAL NIGHTLY
Status: DISCONTINUED | OUTPATIENT
Start: 2022-11-26 | End: 2022-11-30 | Stop reason: HOSPADM

## 2022-11-26 RX ADMIN — VANCOMYCIN HYDROCHLORIDE 1000 MG: 1 INJECTION, POWDER, LYOPHILIZED, FOR SOLUTION INTRAVENOUS at 03:32

## 2022-11-26 RX ADMIN — SODIUM CHLORIDE: 9 INJECTION, SOLUTION INTRAVENOUS at 03:28

## 2022-11-26 RX ADMIN — ROSUVASTATIN CALCIUM 40 MG: 40 TABLET, FILM COATED ORAL at 21:14

## 2022-11-26 RX ADMIN — INSULIN LISPRO 8 UNITS: 100 INJECTION, SOLUTION INTRAVENOUS; SUBCUTANEOUS at 12:16

## 2022-11-26 RX ADMIN — INSULIN GLARGINE 35 UNITS: 100 INJECTION, SOLUTION SUBCUTANEOUS at 21:09

## 2022-11-26 RX ADMIN — SODIUM CHLORIDE: 9 INJECTION, SOLUTION INTRAVENOUS at 20:58

## 2022-11-26 RX ADMIN — CEFEPIME 2000 MG: 2 INJECTION, POWDER, FOR SOLUTION INTRAVENOUS at 21:00

## 2022-11-26 RX ADMIN — ASPIRIN 81 MG: 81 TABLET, COATED ORAL at 09:59

## 2022-11-26 RX ADMIN — ENOXAPARIN SODIUM 40 MG: 100 INJECTION SUBCUTANEOUS at 09:59

## 2022-11-26 RX ADMIN — CEFEPIME 2000 MG: 2 INJECTION, POWDER, FOR SOLUTION INTRAVENOUS at 04:58

## 2022-11-26 RX ADMIN — INSULIN LISPRO 4 UNITS: 100 INJECTION, SOLUTION INTRAVENOUS; SUBCUTANEOUS at 17:11

## 2022-11-26 RX ADMIN — INSULIN LISPRO 8 UNITS: 100 INJECTION, SOLUTION INTRAVENOUS; SUBCUTANEOUS at 09:59

## 2022-11-26 RX ADMIN — CEFEPIME 2000 MG: 2 INJECTION, POWDER, FOR SOLUTION INTRAVENOUS at 12:21

## 2022-11-26 RX ADMIN — VANCOMYCIN HYDROCHLORIDE 1250 MG: 5 INJECTION, POWDER, LYOPHILIZED, FOR SOLUTION INTRAVENOUS at 15:41

## 2022-11-26 RX ADMIN — FAMOTIDINE 20 MG: 20 TABLET, FILM COATED ORAL at 09:59

## 2022-11-26 RX ADMIN — SODIUM CHLORIDE, PRESERVATIVE FREE 10 ML: 5 INJECTION INTRAVENOUS at 11:13

## 2022-11-26 RX ADMIN — OLANZAPINE 5 MG: 5 TABLET, FILM COATED ORAL at 21:02

## 2022-11-26 RX ADMIN — FAMOTIDINE 20 MG: 20 TABLET, FILM COATED ORAL at 21:02

## 2022-11-26 RX ADMIN — SODIUM CHLORIDE: 4.5 INJECTION, SOLUTION INTRAVENOUS at 10:03

## 2022-11-26 ASSESSMENT — ENCOUNTER SYMPTOMS
EYE ITCHING: 0
EYES NEGATIVE: 1
SHORTNESS OF BREATH: 0
EYE PAIN: 0
BLOOD IN STOOL: 0
CONSTIPATION: 0
GASTROINTESTINAL NEGATIVE: 1
RESPIRATORY NEGATIVE: 1
ABDOMINAL PAIN: 0
ABDOMINAL DISTENTION: 1
EYE REDNESS: 0
COLOR CHANGE: 0
PHOTOPHOBIA: 0
SINUS PAIN: 0
ABDOMINAL DISTENTION: 0
APNEA: 0
COUGH: 0
EYE DISCHARGE: 0
CHOKING: 0
DIARRHEA: 0

## 2022-11-26 NOTE — CONSULTS
[facility] -   Infectious diseases evaluation  admission date 11/25/2022    reason for consultation:   Right foot infection    Impression :   Current:  Right hallux ulcer probes to bone /cellulitis/suspected osteomyelitis. Ventral hernia  Diabetes mellitus  Schizoaffective disorder  Smoking    Recommendations   IV vancomycin and cefepime  Follow cultures  Right foot MRI pending  Arterial Doppler pending  Follow C-reactive protein and sedimentation rate. Follow CBC renal function. Infection Control Recommendations   [place] Precautions    Antimicrobial Stewardship Recommendations   Simplification of therapy  Targeted therapy      History of Present Illness:   Initial history:  Osei Conte is a 49y.o.-year-old male presented to hospital with right foot pain worsening over 2 weeks associated with open wound to the right hallux purulent drainage and redness that is extending to the foot dorsum. Symptoms severe, no alleviating or aggravating factors. He is afebrile. The patient had history of uncontrolled diabetes. He was noted to have ventral hernia, surgery has been consulted. He denied abdominal pain, no nausea or vomiting, no diarrhea, no cough or shortness of breath, no other complaints. Initial WBC was 16.3, glucose 691, creatinine 1, C-reactive protein 196, sedimentation rate 71  Wound swab showed few gram-positive cocci in pairs on gram stain ,pending cultures.   Initial lactic acid was 1.5    Interval changes  11/26/2022   Patient Vitals for the past 8 hrs:   BP Temp Temp src Pulse Resp SpO2   11/26/22 1101 (!) 135/93 98.6 °F (37 °C) Oral 99 16 99 %   11/26/22 0713 (!) 148/97 98.8 °F (37.1 °C) Oral 90 16 95 %   11/26/22 0535 124/81 98.1 °F (36.7 °C) Axillary 92 16 96 %             I have personally reviewed the past medical history, past surgical history, medications, social history, and family history, and I haveupdated the database accordingly. Allergies:   Patient has no known allergies. Review of Systems:     Review of Systems  As per history of present illness, other than above 12 system review was negative. Physical Examination :       Physical Exam  Constitutional:       General: He is not in acute distress. HENT:      Head: Normocephalic and atraumatic. Right Ear: External ear normal.      Left Ear: External ear normal.   Eyes:      General: No scleral icterus. Conjunctiva/sclera: Conjunctivae normal.   Cardiovascular:      Rate and Rhythm: Normal rate and regular rhythm. Heart sounds: Normal heart sounds. Pulmonary:      Effort: Pulmonary effort is normal. No respiratory distress. Abdominal:      Palpations: Abdomen is soft. Comments: Large ventral hernia   Musculoskeletal:      Right lower leg: No edema. Left lower leg: No edema. Lymphadenopathy:      Cervical: No cervical adenopathy. Skin:     Coloration: Skin is not jaundiced. Findings: No bruising. Comments: Right hallux ulcer, probes to bone, mild amount of purulent drainage, erythema to the right hallux and foot dorsum, no fluctuation or crepitance. Neurological:      General: No focal deficit present. Mental Status: He is alert. Mental status is at baseline.        Past Medical History:     Past Medical History:   Diagnosis Date    Acute encephalopathy     Acute kidney injury ([address]     Homelessness     Hyperglycemia     Ketonemia     Lactic acidosis     Noncompliance     Schizoaffective disorder (HCC)     Tobacco abuse     Type 2 diabetes mellitus with hyperosmolar nonketotic hyperglycemia (HCC) 10/02/2016    Ventral hernia     Wound of abdomen     Wound of left ankle        Past Surgical  History:     Past Surgical History:   Procedure Laterality Date    ARM SURGERY         Medications:      nicotine  1 patch TransDERmal Daily    OLANZapine  5 mg Oral Nightly    rosuvastatin  40 mg Oral Nightly    sodium chloride flush 11/26/22  0602   PROT 6.1*   LABALBU 3.3*   BILITOT 0.5   ALKPHOS 91   ALT 11   AST 10     No results for input(s): RPR in the last 72 hours. No results for input(s): HIV in the last 72 hours. No results for input(s): BC in the last 72 hours. Lab Results   Component Value Date/Time    CREATININE 0.61 11/26/2022 06:02 AM    GLUCOSE 215 11/26/2022 06:02 AM    GLUCOSE 595 03/11/2020 10:15 PM       Detailed results: Thank you for allowing us to participate in the care of this patient. Please call with questions. This note is created with the assistance of a speech recognition program.  While intending to generate adocument that actually reflects the content of the visit, the document can still have some errors including those of syntax and sound a like substitutions which may escape proof reading. It such instances, actual meaningcan be extrapolated by contextual diversion.     Rojas Reina MD  Office: [phone]  Perfect serve / office [phone]

## 2022-11-26 NOTE — PROGRESS NOTES
Writer spoke with security regarding patient belongings consisting of bicycle, backpack, and bucket that contained other items of patient. Security stated that belongings would be locked up with them and when patient is discharged, security will need notified to release patient's belongings back to him. Patient updated, no questions at this time.

## 2022-11-26 NOTE — CONSULTS
Drug use: No    Sexual activity: Not on file   Other Topics Concern    Not on file   Social History Narrative    Not on file     Social Determinants of Health     Financial Resource Strain: Not on file   Food Insecurity: Not on file   Transportation Needs: Not on file   Physical Activity: Not on file   Stress: Not on file   Social Connections: Not on file   Intimate Partner Violence: Not on file   Housing Stability: Not on file       Family History:       Problem Relation Age of Onset    Cancer Mother     Cancer Father        REVIEW OF SYSTEMS:    Review of Systems   Constitutional:  Negative for activity change, chills and fatigue. HENT:  Negative for congestion, drooling and ear pain. Eyes:  Negative for photophobia, pain and discharge. Respiratory:  Negative for apnea, choking and shortness of breath. Cardiovascular:  Positive for leg swelling (Right foot and lower extremity). Negative for chest pain and palpitations. Gastrointestinal:  Positive for abdominal distention (Midline periumbilical). Negative for abdominal pain, blood in stool and constipation. Endocrine: Positive for polydipsia. Negative for cold intolerance and heat intolerance. Genitourinary:  Negative for difficulty urinating, enuresis and frequency. Musculoskeletal:  Positive for gait problem (Due to right foot pain) and joint swelling (In right foot and great toe). Negative for arthralgias. Skin:  Negative for color change, pallor and rash. Allergic/Immunologic: Negative for environmental allergies, food allergies and immunocompromised state. Neurological:  Negative for dizziness, numbness and headaches. Hematological:  Negative for adenopathy. Does not bruise/bleed easily. Psychiatric/Behavioral:  Positive for behavioral problems (has schizoaffective disorder). Negative for agitation.           PHYSICAL EXAM:    VITALS:  BP (!) 148/97   Pulse 90   Temp 98.8 °F (37.1 °C) (Oral)   Resp 16   Ht 6' 3\" (1.905 m)   Wt 220 lb (99.8 kg)   SpO2 95%   BMI 27.50 kg/m²   INTAKE/OUTPUT: . Intake/Output Summary (Last 24 hours) at 11/26/2022 0745  Last data filed at 11/25/2022 2037  Gross per 24 hour   Intake 1000 ml   Output 700 ml   Net 300 ml       Physical Exam  Vitals and nursing note reviewed. Constitutional:       Appearance: Normal appearance. He is normal weight. HENT:      Head: Normocephalic and atraumatic. Right Ear: Tympanic membrane, ear canal and external ear normal.      Left Ear: Tympanic membrane, ear canal and external ear normal.      Nose: Nose normal. No congestion. Mouth/Throat:      Mouth: Mucous membranes are moist.      Pharynx: Oropharynx is clear. No oropharyngeal exudate. Eyes:      General: No scleral icterus. Extraocular Movements: Extraocular movements intact. Conjunctiva/sclera: Conjunctivae normal.      Pupils: Pupils are equal, round, and reactive to light. Cardiovascular:      Rate and Rhythm: Normal rate and regular rhythm. Pulses: Normal pulses. Pulmonary:      Effort: Pulmonary effort is normal. No respiratory distress. Breath sounds: Normal breath sounds. Abdominal:      General: Abdomen is flat. There is no distension. Tenderness: There is no abdominal tenderness. There is no rebound. Hernia: A hernia (10 x 15 cm soft periumbilical mass consistent with hernia which is nontender) is present. Genitourinary:     Penis: Normal.       Testes: Normal.      Prostate: Normal.   Musculoskeletal:         General: No swelling or tenderness. Normal range of motion. Cervical back: Normal range of motion and neck supple. No rigidity. Skin:     General: Skin is warm and dry. Capillary Refill: Capillary refill takes less than 2 seconds. Coloration: Skin is not jaundiced. Neurological:      General: No focal deficit present. Mental Status: He is alert and oriented to person, place, and time.       Cranial Nerves: No cranial nerve deficit. Psychiatric:         Mood and Affect: Mood normal.         Behavior: Behavior normal.         CBC:   Lab Results   Component Value Date/Time    WBC 16.3 11/25/2022 01:25 PM    RBC 4.67 11/25/2022 01:25 PM    RBC 4.99 03/11/2020 10:15 PM    HGB 14.0 11/25/2022 01:25 PM    HCT 41.4 11/25/2022 01:25 PM    MCV 88.7 11/25/2022 01:25 PM    MCH 30.0 11/25/2022 01:25 PM    MCHC 33.8 11/25/2022 01:25 PM    RDW 11.9 11/25/2022 01:25 PM     11/25/2022 01:25 PM    MPV 10.8 11/25/2022 01:25 PM     CMP:    Lab Results   Component Value Date/Time     11/26/2022 06:02 AM    K 3.9 11/26/2022 06:02 AM    CL 99 11/26/2022 06:02 AM    CO2 23 11/26/2022 06:02 AM    BUN 10 11/26/2022 06:02 AM    CREATININE 0.61 11/26/2022 06:02 AM    GFRAA >60 09/19/2022 07:38 PM    LABGLOM >60 11/26/2022 06:02 AM    GLUCOSE 215 11/26/2022 06:02 AM    GLUCOSE 595 03/11/2020 10:15 PM    PROT 6.1 11/26/2022 06:02 AM    LABALBU 3.3 11/26/2022 06:02 AM    CALCIUM 8.9 11/26/2022 06:02 AM    BILITOT 0.5 11/26/2022 06:02 AM    ALKPHOS 91 11/26/2022 06:02 AM    AST 10 11/26/2022 06:02 AM    ALT 11 11/26/2022 06:02 AM       Pertinent Radiology:    VL LOWER EXTREMITY ARTERIAL SEGMENTAL PRESSURES W PPG   Final Result      XR FOOT RIGHT (MIN 3 VIEWS)   Final Result   1. Soft tissue ulceration/irregularity of the great toe. 2. Mild degenerative changes as detailed above. Pes planus. 3. No acute fracture or dislocation. 4. Mild plantar calcaneal spur. MRI FOOT RIGHT WO CONTRAST    (Results Pending)         ASSESSMENT   Longstanding periumbilical ventral hernia with CAT scan demonstrating same back in 2015. Patient with acute infection of right foot likely due to uncontrolled diabetes and peripheral vascular disease.     PLAN    Expectant management of ventral hernia in this noncompliant diabetic actively smoking patient with acute infection  No acute general surgical plans  Please contact if can be of further assistance.         Electronically signed by Vito López DO  on 11/26/2022 at 7:45 AM

## 2022-11-26 NOTE — PROGRESS NOTES
PT SHIFT UPDATE    Pt's fluid boluses were D/C by NP Brianna Cat. IV fluids were changed to run at 100 mL/hr NS, but now at 4:00 am, changed to 125 mL/hr. Pt needs consult to infectious disease for antibiotic management this morning. Also needs surgery consulted for hernia. Dietician consulted d/t pt's wounds. Pt needs a urine sample for osmolality. Pt's BS at 23:19 was 319. 35 units nightly Lantus given, along with 4 units Humalog. Pt has rt foot & toe wounds. Preventative dressing is in place and is being monitored by Podiatry. Will continue with care plan.

## 2022-11-26 NOTE — PLAN OF CARE
Problem: Discharge Planning  Goal: Discharge to home or other facility with appropriate resources  Outcome: Progressing  Flowsheets (Taken 11/25/2022 2100)  Discharge to home or other facility with appropriate resources:   Identify barriers to discharge with patient and caregiver   Arrange for needed discharge resources and transportation as appropriate   Identify discharge learning needs (meds, wound care, etc)     Problem: Pain  Goal: Verbalizes/displays adequate comfort level or baseline comfort level  Outcome: Progressing     Problem: Safety - Adult  Goal: Free from fall injury  Outcome: Progressing

## 2022-11-26 NOTE — PROGRESS NOTES
Pt arrived from ED via stretcher with IV pole. Pt currently has 2 of the 3L ordered bolus running by gravity. Pt is A&Ox4. Pt presents with hernia on abdomen and rt foot wrapped by podiatry d/t rt foot wound. Pt's HR elevated (108) and low degree slight temp (99.1 F). Pt oriented to room, calm & cooperative. Telemetry and gown on. Call light within reach and bed in lowest position.

## 2022-11-26 NOTE — PROGRESS NOTES
Awaiting Home IV Abx approval from [facility]. Oswald # [phone] from [facility] will deliver wound vac today or Monday.        12/14/18 7963   Discharge Reassessment   Assessment Type Discharge Planning Reassessment   Provided patient/caregiver education on the expected discharge date and the discharge plan Yes   Do you have any problems affording any of your prescribed medications? No   Discharge Plan A Home Health;Home with family   Discharge Plan B Home with family;Home Health      Progress Note  Podiatric Medicine and Surgery     Subjective     CC: Right hallux wound with pain     - Patient seen and examined at bedside.  - No acute events overnight.  - Patient reports to having pain pain within his right hallux this morning  - Patient states that he took down his own dressings so that he could clean off his foot  -Patient still denies any systemic signs of infection including: Nausea, vomiting, fevers, chills. HPI :  Bonita Alaniz is a 49 y.o. male seen at [facility] ED by podiatry for a right hallux with associated pain. Patient states that he has been dealing with the right hallux wound for around 2 weeks and just recently developed pain and redness. He says that he has noticed increase drainage from the area and became increasing concerned for infection. He denies any systemic signs of infection including nausea, vomiting, fever or chills. He is a diabetic patient with blood sugars currently 691. Says that he does not take his medication. PCP is Sarai Carpenter MD    ROS: Denies N/V/F/C/SOB/CP. Otherwise negative except at stated in the HPI.      Medications:  Scheduled Meds:   sodium chloride flush  5-40 mL IntraVENous 2 times per day    aspirin EC  81 mg Oral Daily    sodium chloride flush  5-40 mL IntraVENous 2 times per day    enoxaparin  40 mg SubCUTAneous Daily    famotidine  20 mg Oral BID    cefepime  2,000 mg IntraVENous Q8H    insulin lispro  0-16 Units SubCUTAneous TID WC    insulin lispro  0-4 Units SubCUTAneous Nightly    insulin glargine  35 Units SubCUTAneous Nightly    vancomycin  1,000 mg IntraVENous Q12H    vancomycin (VANCOCIN) intermittent dosing (placeholder)   Other RX Placeholder       Continuous Infusions:   sodium chloride      sodium chloride      sodium chloride         PRN Meds:sodium chloride flush, sodium chloride, sodium chloride flush, sodium chloride, acetaminophen **OR** acetaminophen, morphine **OR** morphine, oxyCODONE-acetaminophen **OR** oxyCODONE-acetaminophen    Objective     Vitals:  Patient Vitals for the past 8 hrs:   BP Temp Temp src Pulse Resp SpO2   22 0713 (!) 148/97 98.8 °F (37.1 °C) Oral 90 16 95 %   22 0535 124/81 98.1 °F (36.7 °C) Axillary 92 16 96 %   22 0049 (!) 143/97 98.4 °F (36.9 °C) Axillary (!) 102 18 99 %     Average, Min, and Max for last 24 hours Vitals:  TEMPERATURE:  Temp  Av.5 °F (36.9 °C)  Min: 98.1 °F (36.7 °C)  Max: 99.1 °F (37.3 °C)    RESPIRATIONS RANGE: Resp  Av.3  Min: 14  Max: 18    PULSE RANGE: Pulse  Av.4  Min: 90  Max: 118    BLOOD PRESSURE RANGE:  Systolic (23MPM), SHALONDA:713 , Min:124 , WNQ:551   ; Diastolic (99VMP), UVJ:51, Min:73, Max:97      PULSE OXIMETRY RANGE: SpO2  Av.4 %  Min: 95 %  Max: 99 %    I/O last 3 completed shifts: In: 1000 [IV Piggyback:1000]  Out: 700 [Urine:700]    CBC:  Recent Labs     22  1325 22  0602   WBC 16.3*  --    HGB 14.0  --    HCT 41.4  --      --    .2* 178.9*        BMP:  Recent Labs     22  1325 22  0602   * 135   K 5.2 3.9   CL 83* 99   CO2 25 23   BUN 23* 10   CREATININE 1.00 0.61*   GLUCOSE 691* 215*   CALCIUM 9.7 8.9        Coags:  Recent Labs     22  0602   PROT 6.1*   INR 1.0       Lab Results   Component Value Date    SEDRATE 68 (H) 2022     Recent Labs     22  1325 22  0602   .2* 178.9*       Lower Extremity Physical Exam:  Vascular: DP and PT pulses are lightly palpable. CFT <3 seconds to all digits. Hair growth is absent to the level of the digits. Edema noted to right foot. Neuro: Saph/sural/SP/DP/plantar sensation diminished to light touch. Musculoskeletal: Muscle strength is 4/5 to all lower extremity muscle groups. Gross deformity is absent. Dermatologic:  Erythema with increased warmth noted to dorsal aspect of patients right foot. Full thickness ulcer #1 located to the distal aspect of the right hallux.  Base has a mix of granular, fibrotic and necrotic tissue. Periwound skin is hyperkeratotic. No purulent drainage expressed with no associated mal odor. Erythema present with associated increase in warmth. Probes to bone and tracks dorsal laterally. No fluctuance, crepitus, or induration. Interdigital maceration absent. Clinical Images:            Imaging:   VL LOWER EXTREMITY ARTERIAL SEGMENTAL PRESSURES W PPG   Final Result      XR FOOT RIGHT (MIN 3 VIEWS)   Final Result   1. Soft tissue ulceration/irregularity of the great toe. 2. Mild degenerative changes as detailed above. Pes planus. 3. No acute fracture or dislocation. 4. Mild plantar calcaneal spur. MRI FOOT RIGHT WO CONTRAST    (Results Pending)       Cultures:   Taken at bedside in the ED. final results pending    Assessment   Mahendra Yanez is a 49 y.o. male with   Diabetic Ulcer down to the level of bone, right hallux  Possible osteomyelitis, right hallux  Cellulitis, right foot  Uncontrolled type II DM with peripheral neuropathy. Principal Problem:    Sepsis ([address]  Active Problems:    Cellulitis of right foot    Hyponatremia    Ventral hernia without obstruction or gangrene    Hyperglycemia    Type 2 diabetes mellitus with hyperglycemia, with long-term current use of insulin ([facility])    Tobacco abuse  Resolved Problems:    * No resolved hospital problems. *       Plan     Patient examined and evaluated at bedside   Treatment options discussed in detail with the patient  Patient has been admitted to the hospital under the IM team.  Radiographs reviewed and discussed in detail with patient. Erosive changes noted to the distal aspect of the distal hallucal phalanx. Concerns for osteomyelitis. No abscess or soft tissue gas noted        Dressings were already removed by patient prior to today's visit. Findings discussed above. Dressing applied to right foot: Betadine wet to dry, Kerlix and tape.    Patient is currently receiving Cefepime and Vanco. Order placed for ID for antibiotic management. Appreciate recommendations  Noninvasive vascular studies show the following:  Right ROMARIO: 0.75  Left ROMARIO: 0.99  Orders been placed for referral MRI. Still needs to be performed. Plan is to continue with daily dressing changes until MRI imaging has been performed. MRI imaging has been performed and evaluated, further surgical intervention will be considered.   WBAT to Right lower extremity  Discussed with Dr. Brian Kinsey DPM   Podiatric Medicine & Surgery   11/26/2022 at 8:40 AM

## 2022-11-26 NOTE — PROGRESS NOTES
[facility]  PROGRESS NOTE    Room # 1002/1002-02   Name: Grace Martinez              Reason for visit: Routine    I visited the patient. Admit Date & Time: 11/25/2022  1:14 PM    Assessment:  Grace Martinez is a 49 y.o. male. Upon entering the room patient was passive. No major needs expressed. PT: had request for POA documents,  explains, PT: states to read over, possible follow up. Intervention:   provided a ministry presence. Outcome:  Patient grateful    Plan:  Chaplains will remain available to offer spiritual and emotional support as needed. Electronically signed by Chaplain Cherie, on 11/26/2022 at 3:21 PM.  Juan Ramon      11/26/22 1520   Encounter Summary   Service Provided For: Patient   Referral/Consult From: Nurse   Last Encounter  11/26/22   Complexity of Encounter Moderate   Begin Time 1135   End Time  1200   Total Time Calculated 25 min   Encounter    Type Initial Screen/Assessment   Advance Care Planning   Type ACP conversation   Assessment/Intervention/Outcome   Assessment Calm;Passive   Intervention Discussed belief system/Shinto practices/[place];Discussed illness injury and its impact;Sustaining Presence/Ministry of presence   Outcome Receptive; Expressed feelings, needs, and concerns

## 2022-11-26 NOTE — PROGRESS NOTES
Occupational Therapy  Facility[facility]Department: [facility] PROGRESSIVE CARE  Occupational Therapy Initial Assessment    Name: Darwin Rodriguez  : 1965  MRN: [id number]  Date of Service: 2022    RN Arnold Bumpers reports patient is medically stable for therapy treatment this date. Chart reviewed prior to treatment and patient is agreeable for therapy. All lines intact and patient positioned comfortably at end of treatment. All patient needs addressed prior to ending therapy session. Due to recent hospitalization and medical condition, pt would benefit from additional intermittent skilled therapy at time of discharge. Please refer to the AM-PAC score for current functional status. Discharge Recommendations:  Patient would benefit from continued therapy after discharge  OT Equipment Recommendations  Equipment Needed: Yes  Mobility Devices: Chanelle Schillings; ADL Assistive Devices  Walker: Rolling  ADL Assistive Devices: Shower Chair with back       Per H&P: Darwin Rodriguez is a 49 y.o. Non- / non  male who presents with Toe Pain (\"Think I hit it on something a week or 2 ago\" hx diabetes and not taking prescribed metformin ) and is admitted to the hospital for the management of Sepsis ([facility] [address] Patient says he is having right foot pain and it is throbbing. He has a history of diabetes and does not take any diabetes medication regularly. He says he gets his metformin from his brother, and he says his brother manages his diabetes. He injured his right foot a couple of weeks ago, but he did not elaborate on what happened. He has a very large abdominal hernia that he says he has had for a couple of months from lifting something heavy. He denies any abdominal symptoms. Has a past medical history of schizoaffective disorder, tobacco abuse, ventral hernia, ketonemia, homelessness, JONATHAN, and hyperglycemia. While in the ED, WBC was noted to be 16.3, glucose 691, sodium 122.   He was given normal saline fluid boluses, IV cefepime, ovular insulin, and IV vancomycin. Podiatry was consulted. He was admitted for further management of right foot toe infection and right foot cellulitis, hyperglycemia. Patient Diagnosis(es): The primary encounter diagnosis was Hyperglycemia. A diagnosis of Cellulitis of right foot was also pertinent to this visit. Past Medical History:  has a past medical history of Acute encephalopathy, Acute kidney injury ([facility] [address] Homelessness, Hyperglycemia, Ketonemia, Lactic acidosis, Noncompliance, Schizoaffective disorder ([facility] [address] Tobacco abuse, Type 2 diabetes mellitus with hyperosmolar nonketotic hyperglycemia ([facility] [address] Ventral hernia, Wound of abdomen, and Wound of left ankle. Past Surgical History:  has a past surgical history that includes Arm Surgery. Assessment   Performance deficits / Impairments: Decreased functional mobility ; Decreased safe awareness;Decreased balance;Decreased ADL status; Decreased cognition;Decreased endurance;Decreased sensation  Assessment: Pt presents with deficits in functional mobility and ADLs status secondary to significant pain in RLE, decreased balance, and decreased safety awarness. Skilled OT services are indicated at this time to maximize this pt's safety and IND with self care tasks and to facilitate safe return to PLOF as able.   Prognosis: Good  Decision Making: Medium Complexity  REQUIRES OT FOLLOW-UP: Yes  Activity Tolerance  Activity Tolerance: Patient limited by pain        Plan   Occupational Therapy Plan  Times Per Week: 4-5x/week 1-2x/day as tolerated  Current Treatment Recommendations: Balance training, Functional mobility training, Endurance training, Safety education & training, Pain management, Patient/Caregiver education & training, Self-Care / ADL, Home management training, Equipment evaluation, education, & procurement, Positioning, Cognitive/Perceptual training     Restrictions  Restrictions/Precautions  Restrictions/Precautions: Up as Tolerated, General Precautions, Weight Bearing  Required Braces or Orthoses?: No  Lower Extremity Weight Bearing Restrictions  Right Lower Extremity Weight Bearing: Weight Bearing As Tolerated  Position Activity Restriction  Other position/activity restrictions: Up as tolerated and with assist, WBAT to RLE, telemetry, LUE IV, RUE IV    Subjective   General  Chart Reviewed: Yes  Patient assessed for rehabilitation services?: Yes  Family / Caregiver Present: No  Subjective  Subjective: Pt supine in bed upon arrival, pleasant and agreeable to participation in therapy. Rating pain in R toe a 6-7/10.      Social/Functional History  Social/Functional History  Lives With: Family (Brother)  Type of Home: House  Home Layout: Two level, Laundry in basement, Bed/Bath upstairs, 1/2 bath on main level (Full flight of stair to second story with 1 HR and to basement 5-7 steps then landing and then 5-7 more with 1 HR)  Home Access: Stairs to enter with rails  Entrance Stairs - Number of Steps: 5  Entrance Stairs - Rails: Left (Ascending)  Bathroom Shower/Tub: Tub/Shower unit  Bathroom Toilet: Standard  Bathroom Equipment: Grab bars in shower, Grab bars around toilet  Home Equipment:  (No DME)  Has the patient had two or more falls in the past year or any fall with injury in the past year?: No  ADL Assistance: Independent  Homemaking Assistance: Independent  Ambulation Assistance: Independent  Transfer Assistance: Independent  Active : No  Patient's  Info: Per pt - needs to get 's license back - has been riding his bike for community mobility needs  Occupation: Unemployed  Type of Occupation: Wants to work at [employer] eventually  [address]: Going to [employer]       Objective           Observation/Palpation  Posture: Fair (Seated at EOB; rounded shoulders and head down - able to correct with cues)  Observation: Pt reports some tingling in R foot, bandage & ACE wrap applied to RLE, slow to respond in conversation  Edema: mild in RLE  Safety Devices  Type of Devices: Gait belt;Nurse notified; Patient at risk for falls; Bed alarm in place;Call light within reach; Left in bed    Bed Mobility Training  Bed Mobility Training: Yes  Overall Level of Assistance: Stand-by assistance  Interventions: Safety awareness training;Verbal cues (Min VCs on pacing self, use of bedrails, and positioning self in bed all to increase overall safety.)  Supine to Sit: Stand-by assistance (with HOB flat)  Sit to Supine: Stand-by assistance (with HOB flat)    Balance  Sitting:  (SBA while seated at EOB)  Standing:  (CGA - Cornelio with RW)    Transfer Training  Transfer Training: Yes  Overall Level of Assistance: Contact-guard assistance;Minimum assistance (STS transfer trials completed iniatially with no AD and then with RW; pt demo'd increased stability when standing at RW; balance limited d/t pain in RLE.)  Interventions: Safety awareness training;Verbal cues; Visual cues (Max verbal/visual cues on WB precautions, upright posture, hand placement on stable surfaces, weightshifting side to side in standing, and controlled sit<>stand all to increase overall safety and reduce the risk of falls.)  Sit to Stand: Contact-guard assistance;Minimum assistance  Stand to Sit: Contact-guard assistance    Functional Mobility  Overall Level of Assistance: Minimum assistance (Pt only able to tolerated forward/retro steps at beside this date d/t pain. Educated pt on use of RW for increased stability and assist with RLE pain, including differences in RW mobility for WBAT vs NWB, as pt would treat RLE as NWB at times d/t pain.)  Interventions: Safety awareness training;Verbal cues; Visual cues;Demonstration (Mod VCs and visual demo provided for RW safety/mgnt, upright posture, sequencing of steps with RW, maintaining KAMARI within RW, and pacing self all to increase overall safety and reduce the risk of falls.)  Assistive Device: Gait belt;Walker, rolling     AROM: Within functional limits  Strength: Within functional limits  Coordination: Within functional limits  Tone: Normal  Sensation: Impaired (Pt reports some tingling in R foot/toes)    ADL  Feeding: Setup  Grooming: Stand by assistance;Setup (Seated position)  LE Bathing: Stand by assistance;Setup  UE Dressing: Minimal assistance;Setup  LE Dressing: Minimal assistance  Toileting: Minimal assistance  Additional Comments: Pt's participation in ADLs limited d/t pain in R foot/RLE, decreased balance, decreased functional activity tolerance, and decreased safety awareness. Educated pt on use of RW to assist with ADL transfers/functional mobility d/t significant pain and difficulty with WB through RLE. Pt had Fair return demo of education, requiring max cues on sequencing of steps, safety, and mgnt of RW. Would benefit from continued education in order to improve safety/IND with self care tasks. Vision  Vision: Within Functional Limits  Hearing  Hearing: Within functional limits  Cognition  Overall Cognitive Status: Exceptions  Arousal/Alertness: Delayed responses to stimuli  Following Commands: Follows one step commands with repetition; Follows one step commands with increased time  Attention Span: Attends with cues to redirect  Memory: Decreased recall of precautions;Decreased short term memory  Safety Judgement: Decreased awareness of need for safety;Decreased awareness of need for assistance  Problem Solving: Decreased awareness of errors;Assistance required to identify errors made;Assistance required to generate solutions  Insights: Decreased awareness of deficits  Initiation: Does not require cues  Sequencing: Requires cues for some  Cognition Comment: Pt demo'd delayed processing throughout session, was slow to respond to questions in conversation and had difficulty with following directions on safe RW mgnt.   Orientation  Overall Orientation Status: Impaired  Orientation Level: Oriented to person;Oriented to place; Disoriented to time;Oriented to situation                  Education Given To: Patient  Education Provided: Role of Therapy;Transfer Training;Equipment;Plan of Care; Fall Prevention Strategies;Precautions; ADL Adaptive Strategies; Energy Conservation  Education Method: Demonstration;Verbal  Barriers to Learning: Cognition  Education Outcome: Verbalized understanding;Continued education needed          AM-PAC Score        AM-PAC Inpatient Daily Activity Raw Score: 18 (11/26/22 1303)  AM-PAC Inpatient ADL T-Scale Score : 38.66 (11/26/22 1303)  ADL Inpatient CMS 0-100% Score: 46.65 (11/26/22 1303)  ADL Inpatient CMS G-Code Modifier : CK (11/26/22 1303)        Goals  Short Term Goals  Time Frame for Short Term Goals: By discharge, pt to demo:  Short Term Goal 1: bed mobility tasks with MOD I/IND and use of bedrails as needed. Short Term Goal 2: ADL transfers and functional mobility tasks with SUP and Good safety with use of AD as needed. Short Term Goal 3: LB ADLs to SUP and Good safety with use of AE/compensatory strategies/AD as needed. Short Term Goal 4: IND with a BUE HEP, with use of handouts as needed, in order to maintain current strength/ROM required for safety and IND with self care tasks. Short Term Goal 5: Pt/family to be IND with WB precautions, fall prevention strategies, EC/WS tech, compensatory ADL strategies, and discharge/equipment recommendations with use of handouts as needed. Patient Goals   Patient goals : To decrease pain!        Therapy Time   Individual Concurrent Group Co-treatment   Time In 1042         Time Out 1128 (10 mins added for chart review & RN coordination)         Minutes 46         Tx Time: 25 minutes       Sabine Mo OT

## 2022-11-26 NOTE — CONSULTS
[facility] Pharmacokinetic Monitoring Service - Vancomycin     Hung Steward is a 49 y.o. male starting on vancomycin therapy for SSTI. Pharmacy consulted by Dr. Bouchra Quesada for monitoring and adjustment. Target Concentration: Goal trough of 10-15 mg/L and AUC/LIMA <500 mg*hr/L    Additional Antimicrobials: Cefepime    Pertinent Laboratory Values: Wt Readings from Last 1 Encounters:   11/25/22 220 lb (99.8 kg)     Temp Readings from Last 1 Encounters:   11/25/22 98.1 °F (36.7 °C) (Temporal)     Estimated Creatinine Clearance: 101 mL/min (based on SCr of 1 mg/dL). Recent Labs     11/25/22  1325   CREATININE 1.00   WBC 16.3*     Procalcitonin: none    Pertinent Cultures:  Culture Date Source Results   11/25/22 Blood pending   11/25/22 Foot pending   MRSA Nasal Swab: N/A. Non-respiratory infection.     Plan:  Dosing recommendations based on Bayesian software  Start vancomycin 2500 mg x 1 dose bolus followed by 1000 mg Q12H   Anticipated AUC of 449 and trough concentration of 14.4 at steady state  Renal labs as indicated   Vancomycin concentration ordered for 11/26 @ 0600   Pharmacy will continue to monitor patient and adjust therapy as indicated    Thank you for the consult,  Trace Carvalho  11/25/2022 8:01 PM

## 2022-11-26 NOTE — H&P
[facility]  Office: [address], DO, Humberto Holguin, DO, Ever Daigle, DO, George Galindo, DO, Velton Habermann, MD, Mojgan Russo MD, Joe Awan MD, Herman Wang MD,  Bonita Vila MD, Amrit Contreras MD, Hayde Tuttle DO, Kyra Calderon MD,  Linda Mackey MD, Winnie Barnes MD, Maksim Muhammad DO, Elida Marinelli MD, Rea Garcia MD, Diane Freedman DO, Jeronimo Izquierdo MD, Eagle Obrien MD, Joe Watts MD, Km Lyons MD, Facundo De La Cruz DO, Gloria Newell MD, Zee Inman MD, Manav Skinner, CNP,  Adele Boucher, CNP, Marcelina Huynh, CNP, Jessica Sam, CNP,  Claudette Slade, [facility], Kim Montero, CNP, Darian Bhatia, CNP, Too Carey, CNP, Luba Valencia, CNP, Cathi Wood, CNP, Neo Rasmussen, PA-C, Pardeep Mayen, CNS, Shakira Hicks, [facility], Toni Drew, CNP, Ganesh Durant, CNP, Celia Bush, [facility]    HISTORY AND PHYSICAL EXAMINATION            Date:   11/26/2022  Patient name:  Rox Marrufo  Date of admission:  11/25/2022  1:14 PM  MRN:   [id number]  Account:  [de-identified]  YOB: 1965  PCP:    Bonita Vila MD  Room:   1002/1002-02  Code Status:    Full Code    Chief Complaint:     Chief Complaint   Patient presents with    Toe Pain     \"Think I hit it on something a week or 2 ago\" hx diabetes and not taking prescribed metformin        History Obtained From:     patient, electronic medical record, Quality of history:  poor historian    History of Present Illness:     Rox Marrufo is a 49 y.o. Non- / non  male who presents with Toe Pain (\"Think I hit it on something a week or 2 ago\" hx diabetes and not taking prescribed metformin )   and is admitted to the hospital for the management of Sepsis ([facility] [address] Patient says he is having right foot pain and it is throbbing.   He has a history of diabetes and does not take any diabetes medication regularly. He says he gets his metformin from his brother, and he says his brother manages his diabetes. He injured his right foot a couple of weeks ago, but he did not elaborate on what happened. He has a very large abdominal hernia that he says he has had for a couple of months from lifting something heavy. He denies any abdominal symptoms. Has a past medical history of schizoaffective disorder, tobacco abuse, ventral hernia, ketonemia, homelessness, JONATHAN, and hyperglycemia. While in the ED, WBC was noted to be 16.3, glucose 691, sodium 122. He was given normal saline fluid boluses, IV cefepime, ovular insulin, and IV vancomycin. Podiatry was consulted. He was admitted for further management of right foot toe infection and right foot cellulitis, hyperglycemia. Past Medical History:     Past Medical History:   Diagnosis Date    Acute encephalopathy     Acute kidney injury ([facility] [address]     Homelessness     Hyperglycemia     Ketonemia     Lactic acidosis     Noncompliance     Schizoaffective disorder (HCC)     Tobacco abuse     Type 2 diabetes mellitus with hyperosmolar nonketotic hyperglycemia ([facility] [address] 10/02/2016    Ventral hernia     Wound of abdomen     Wound of left ankle         Past Surgical History:     Past Surgical History:   Procedure Laterality Date    ARM SURGERY          Medications Prior to Admission:     Prior to Admission medications    Not on File        Allergies:     Patient has no known allergies. Social History:     Tobacco:    reports that he has been smoking cigarettes. He has been smoking an average of 1 pack per day. He has never used smokeless tobacco.  Alcohol:      reports no history of alcohol use. Drug Use:  reports no history of drug use. Family History:     Family History   Problem Relation Age of Onset    Cancer Mother     Cancer Father        Review of Systems:     Positive and Negative as described in HPI. Review of Systems   Constitutional: Negative. HENT: Negative. Eyes: Negative. Respiratory: Negative. Cardiovascular: Negative. Gastrointestinal: Negative. Genitourinary: Negative. Musculoskeletal:  Positive for arthralgias, joint swelling and myalgias. Skin:  Positive for wound. Negative for color change, pallor and rash. Neurological: Negative. Psychiatric/Behavioral: Negative. Physical Exam:   BP (!) 143/97   Pulse (!) 102   Temp 98.4 °F (36.9 °C) (Axillary)   Resp 18   Ht 6' 3\" (1.905 m)   Wt 220 lb (99.8 kg)   SpO2 99%   BMI 27.50 kg/m²   Temp (24hrs), Av.5 °F (36.9 °C), Min:98.1 °F (36.7 °C), Max:99.1 °F (37.3 °C)    Recent Labs     22  1711 22  2319   POCGLU 338* 319*       Intake/Output Summary (Last 24 hours) at 2022 0411  Last data filed at 2022  Gross per 24 hour   Intake 1000 ml   Output 700 ml   Net 300 ml       Physical Exam  Vitals and nursing note reviewed. Constitutional:       General: He is not in acute distress. Appearance: He is ill-appearing. He is not toxic-appearing or diaphoretic. HENT:      Head: Normocephalic and atraumatic. Right Ear: External ear normal.      Left Ear: External ear normal.      Nose: Nose normal. No rhinorrhea. Mouth/Throat:      Mouth: Mucous membranes are dry. Eyes:      General: No scleral icterus. Right eye: No discharge. Left eye: No discharge. Extraocular Movements: Extraocular movements intact. Conjunctiva/sclera: Conjunctivae normal.      Pupils: Pupils are equal, round, and reactive to light. Cardiovascular:      Rate and Rhythm: Regular rhythm. Tachycardia present. Pulses: Normal pulses. Heart sounds: Normal heart sounds. No murmur heard. No friction rub. No gallop. Pulmonary:      Effort: Pulmonary effort is normal. No respiratory distress. Breath sounds: Normal breath sounds. No wheezing, rhonchi or rales. Abdominal:      General: There is no distension. Palpations: Abdomen is soft. Tenderness: There is no abdominal tenderness. There is no guarding. Hernia: No hernia is present. Comments: Hypoactive bowel sound   Musculoskeletal:         General: Normal range of motion. Cervical back: Normal range of motion and neck supple. Right lower leg: No edema. Left lower leg: No edema. Skin:     General: Skin is warm. Coloration: Skin is not jaundiced. Findings: Lesion present. No bruising or erythema. Comments: Dressing to right foot dry and intact   Neurological:      General: No focal deficit present. Mental Status: He is alert and oriented to person, place, and time. Psychiatric:         Attention and Perception: He is inattentive. Mood and Affect: Mood is depressed. Affect is flat. Speech: Speech normal.         Behavior: Behavior is uncooperative and withdrawn. Thought Content: Thought content normal.         Cognition and Memory: Cognition and memory normal.         Judgment: Judgment is inappropriate.        Investigations:      Laboratory Testing:  Recent Results (from the past 24 hour(s))   CBC with Auto Differential    Collection Time: 11/25/22  1:25 PM   Result Value Ref Range    WBC 16.3 (H) 3.5 - 11.3 k/uL    RBC 4.67 4.21 - 5.77 m/uL    Hemoglobin 14.0 13.0 - 17.0 g/dL    Hematocrit 41.4 40.7 - 50.3 %    MCV 88.7 82.6 - 102.9 fL    MCH 30.0 25.2 - 33.5 pg    MCHC 33.8 28.4 - 34.8 g/dL    RDW 11.9 11.8 - 14.4 %    Platelets 093 106 - 633 k/uL    MPV 10.8 8.1 - 13.5 fL    NRBC Automated 0.0 0.0 per 100 WBC    Seg Neutrophils 80 (H) 36 - 65 %    Lymphocytes 9 (L) 24 - 43 %    Monocytes 10 3 - 12 %    Eosinophils % 0 (L) 1 - 4 %    Basophils 1 0 - 2 %    Immature Granulocytes 0 0 %    Segs Absolute 13.04 (H) 1.50 - 8.10 k/uL    Absolute Lymph # 1.47 1.10 - 3.70 k/uL    Absolute Mono # 1.63 (H) 0.10 - 1.20 k/uL    Absolute Eos # 0.00 0.00 - 0.44 k/uL    Basophils Absolute 0.16 0.00 - 0.20 k/uL    Absolute Immature Granulocyte 0.00 0.00 - 0.30 k/uL   Basic Metabolic Panel    Collection Time: 11/25/22  1:25 PM   Result Value Ref Range    Glucose 691 (HH) 70 - 99 mg/dL    BUN 23 (H) 6 - 20 mg/dL    Creatinine 1.00 0.70 - 1.20 mg/dL    Est, Glom Filt Rate >60 >60 mL/min/1.73m2    Bun/Cre Ratio 23 (H) 9 - 20    Calcium 9.7 8.6 - 10.4 mg/dL    Sodium 122 (L) 135 - 144 mmol/L    Potassium 5.2 3.7 - 5.3 mmol/L    Chloride 83 (L) 98 - 107 mmol/L    CO2 25 20 - 31 mmol/L    Anion Gap 14 9 - 17 mmol/L   C-Reactive Protein    Collection Time: 11/25/22  1:25 PM   Result Value Ref Range    .2 (H) 0.0 - 5.0 mg/L   Sedimentation Rate    Collection Time: 11/25/22  1:25 PM   Result Value Ref Range    Sed Rate 71 (H) 0 - 20 mm/Hr   Uric Acid    Collection Time: 11/25/22  1:25 PM   Result Value Ref Range    Uric Acid 4.7 3.4 - 7.0 mg/dL   Lactate, Sepsis    Collection Time: 11/25/22  2:53 PM   Result Value Ref Range    Lactic Acid, Sepsis 1.0 0.5 - 1.9 mmol/L   Culture, Blood 1    Collection Time: 11/25/22  2:55 PM    Specimen: Blood   Result Value Ref Range    Specimen Description . BLOOD     Special Requests RFA     Culture NO GROWTH <24 HRS    Culture, Blood 2    Collection Time: 11/25/22  3:10 PM    Specimen: Blood   Result Value Ref Range    Specimen Description . BLOOD     Special Requests L FOREARM UNK MLS     Culture NO GROWTH <24 HRS    Venous Blood Gas, POC    Collection Time: 11/25/22  3:24 PM   Result Value Ref Range    pH, Kareem Can not be calculated 7.320 - 7.430    pCO2, Kareem Can not be calculated 41.0 - 51.0 mm Hg    pO2, Kareem Can not be calculated 30.0 - 50.0 mm Hg    HCO3, Venous Can not be calculated 22.0 - 29.0 mmol/L    Positive Base Excess, Kareem Can not be calculated 0.0 - 3.0    O2 Sat, Kareem Can not be calculated 60.0 - 85.0 %   Venous Blood Gas, POC    Collection Time: 11/25/22  3:29 PM   Result Value Ref Range    pH, Kareem 7.448 (H) 7.320 - 7.430    pCO2, Kareem 37.6 (L) 41.0 - 51.0 mm Hg    pO2, Kareem 203.0 (H) 30.0 - 50.0 mm Hg    HCO3, Venous 26.1 22.0 - 29.0 mmol/L    Positive Base Excess, Kareem 2 0.0 - 3.0    O2 Sat, Kareem 100 (H) 60.0 - 85.0 %   Culture, Anaerobic and Aerobic    Collection Time: 11/25/22  4:21 PM    Specimen: Foot   Result Value Ref Range    Specimen Description . FOOT     Direct Exam RARE NEUTROPHILS (A)     Direct Exam FEW GRAM POSITIVE COCCI IN PAIRS (A)     Culture PENDING    Lactate, Sepsis    Collection Time: 11/25/22  5:09 PM   Result Value Ref Range    Lactic Acid, Sepsis 1.5 0.5 - 1.9 mmol/L   POC Glucose Fingerstick    Collection Time: 11/25/22  5:11 PM   Result Value Ref Range    POC Glucose 338 (H) 75 - 110 mg/dL   Lactate, Sepsis    Collection Time: 11/25/22  8:00 PM   Result Value Ref Range    Lactic Acid, Sepsis 0.7 0.5 - 1.9 mmol/L   Lactate, Sepsis    Collection Time: 11/25/22  9:21 PM   Result Value Ref Range    Lactic Acid, Sepsis 0.8 0.5 - 1.9 mmol/L   POC Glucose Fingerstick    Collection Time: 11/25/22 11:19 PM   Result Value Ref Range    POC Glucose 319 (H) 75 - 110 mg/dL       Imaging/Diagnostics:  XR FOOT RIGHT (MIN 3 VIEWS)    Result Date: 11/25/2022  1. Soft tissue ulceration/irregularity of the great toe. 2. Mild degenerative changes as detailed above. Pes planus. 3. No acute fracture or dislocation. 4. Mild plantar calcaneal spur. Assessment :      Hospital Problems             Last Modified POA    * (Principal) Sepsis ([address] 11/25/2022 Yes    Cellulitis of right foot 11/25/2022 Yes    Hyperglycemia 11/25/2022 Yes    Type 2 diabetes mellitus with hyperglycemia, with long-term current use of insulin ([address] 11/25/2022 Yes    Hyponatremia 11/26/2022 Yes    Ventral hernia without obstruction or gangrene 11/25/2022 Yes    Tobacco abuse 11/25/2022 Yes       Plan:     Patient status inpatient in the  Progressive Unit/Step down    Sepsis: IV fluids as ordered. Daily CBC. Trend WBC. IV antibiotics as ordered. Monitor for fever.   Follow cultures and adjust antibiotic as appropriate. Pharmacy to dose vancomycin. Consult dietitian for nutritional support for foot wounds. Cellulitis of right foot: See #1. Podiatry following. Appreciate input. Hyperglycemia: Carb controlled diet. Control infection. Check blood glucose before meals and at bedtime. Lantus as ordered. Diabetes: See #3. Check A1c. Hyponatremia: Patient received IV fluid boluses. Suspect due to dehydration. Check sodium now. Check serum osmo, urine Osmo, urine sodium. Trend sodium every 6 hours. Ventral hernia: Consult general surgery  Tobacco abuse: Smoking cessation education prior to discharge. Consultations:   IP CONSULT TO PODIATRY  IP CONSULT TO HOSPITALIST  PHARMACY TO DOSE VANCOMYCIN  IP CONSULT TO PODIATRY  IP CONSULT TO INFECTIOUS DISEASES  IP CONSULT TO SPIRITUAL SERVICES  IP CONSULT TO GENERAL SURGERY  IP CONSULT TO DIETITIAN     Patient is admitted as inpatient status because of co-morbidities listed above, severity of signs and symptoms as outlined, requirement for current medical therapies and most importantly because of direct risk to patient if care not provided in a hospital setting. Expected length of stay > 48 hours.     CATALINO Ding NP  11/26/2022  4:11 AM    Copy sent to Dr. Chelsea Lowery MD

## 2022-11-26 NOTE — PROGRESS NOTES
Comprehensive Nutrition Assessment    Type and Reason for Visit:  Positive Nutrition Screen, Wound (Unplanned weight loss, decreased appetite. Wounds)    Nutrition Recommendations/Plan:   Continue ADULT DIET; Regular; 4 carb choices (60 gm/meal); Low Sodium (2 gm)  Start Glucerna 3x/day  Monitor p.o intakes, wound status and labs     Malnutrition Assessment:  Malnutrition Status:  Insufficient data (11/26/22 1327)      Nutrition Assessment:    Patient admission is related to hyperglycemia, cellulitis of right foot sepsis. Patient is a non-compliant diabetes and smokes a pack of cigarettes. Patient has a right foot hallux wound with increased redness and pain over the past two weeks. Patient  reports his usual weight is 215-220 lbs and his current weight is 180 lbs. According to electronic weight record current weight is 220 lbs but method is not specified. Patient reports eating 100% of breakfast this morning. Will start Glucerna 3x/day. Monitor p.o intakes, wound status and labs. Nutrition Related Findings:    No edema. Active bowel sounds. Diabetic and a smoker. Right foot hallux wound Wound Type: Diabetic Ulcer       Current Nutrition Intake & Therapies:    Average Meal Intake: %, 51-75%     ADULT DIET; Regular; 4 carb choices (60 gm/meal); Low Sodium (2 gm)  ADULT ORAL NUTRITION SUPPLEMENT; Breakfast, Dinner, Lunch; Diabetic Oral Supplement    Anthropometric Measures:  Height: 6' 3\" (190.5 cm)  Ideal Body Weight (IBW): 196 lbs (89 kg)       Current Body Weight: 220 lb (99.8 kg), 112.2 % IBW. Weight Source: Not Specified  Current BMI (kg/m2): 27.5                          BMI Categories: Overweight (BMI 25.0-29. 9)    Estimated Daily Nutrient Needs:  Energy Requirements Based On: Kcal/kg  Weight Used for Energy Requirements: Current  Energy (kcal/day): 1720-8622  Weight Used for Protein Requirements: Ideal  Protein (g/day): 116-125 gm of protein (1.3-1.4 gm/kg)       Nutrition Diagnosis:   Increased nutrient needs related to increase demand for energy/nutrients as evidenced by wounds (right foot hallux wound)    Nutrition Interventions:   Food and/or Nutrient Delivery: Continue Current Diet, Start Oral Nutrition Supplement     Coordination of Nutrition Care: Continue to monitor while inpatient       Goals:     Goals: PO intake 75% or greater       Nutrition Monitoring and Evaluation:      Food/Nutrient Intake Outcomes: Food and Nutrient Intake, Supplement Intake  Physical Signs/Symptoms Outcomes: Biochemical Data, Skin, Weight    Discharge Planning:    Continue current diet, Continue Oral Nutrition Supplement           Laura Driver  MFN, RDN, LDN  Lead Clinical Dietitian  RD Office Phone [phone]

## 2022-11-26 NOTE — CARE COORDINATION
11/26/22 1420   Service Assessment   Patient Orientation Alert and Oriented   Cognition Alert  (poor historian hx schizoeffective non compliance)   History Provided By Patient   Primary Caregiver Self  (has been staying with brother Anselmo Singleton for about 1 month)   Support Systems Family Members   PCP Verified by CM   (no pcp agrees to go back to [facility] [place])   Prior Functional Level Independent in ADLs/IADLs;Assistance with the following:;Shopping;Housework;Cooking   Current Functional Level Independent in ADLs/IADLs; Cooking;Housework; Shopping   Can patient return to prior living arrangement Unknown at present  (will need to check with family)   Ability to make needs known: Fair   Family able to assist with home care needs: Other (comment)  (unsure)   Financial Resources Other (Comment)  (no insurnace listed he relays he has Medicaid and Medicare does not have cards)   [employer]  (needs psych re-established)   CM/SW Referral No PCP; Disease Management Education;Nutrition/Diet;Psychiatry   Social/Functional History   Lives With Other (comment)  (brother)   Type of [address] Two level   [facility] to enter with rails   Entrance Stairs - Number of Steps 4   Bathroom Shower/Tub Walk-in shower   Receives Help From Family   ADL Assistance Independent   Ambulation Assistance Independent   Transfer Assistance Independent   Active  No   Occupation Unemployed   Discharge Planning   Type of Residence Other (Comment)  (unsure)   Current Services Prior To Admission None   Potential [address]; Outpatient IV;Skilled Nursing Facility;Prescription Assistance   Potential DME Needed Walker  (watch for walker, glucometer, insulin, hhc vs snf, HELP)   DME Ordered? No   Type of Home Care Services IV Therapy;Skilled Therapy   Patient expects to be discharged to:  Unknown   One/Two Story Residence Two story   History of falls? 0   Services At/After Discharge   Transition of Care Consult (CM Consult) Other  (UNKNOWN)   Services At/After Discharge   (poss DME, IV ABX, SNF)   Confirm Follow Up Transport Other (see comment)  (brother)   Condition of Participation: Discharge Planning   Freedom of Choice list was provided with basic dialogue that supports the patient's individualized plan of care/goals, treatment preferences, and shares the quality data associated with the providers? Yes       Spoke with pt at bedside, he does have a hx schizoeffective, details are blurry on medical hx. He has his sister Divya's address listed-this is his mailing address. He moved in with his brother Justin De La Cruz last month. He relays he did get some insulin from an urgent care recently but does not have a glucometer-he relays his brother has been giving him insulin. He does not have a pcp-but does agree to go to [facility] [place]-will have to make appt Monday. He does not have any insurance listed-however did see he did have Medicare on previous admissions some years ago-it should still be active and he also relays he thinks he has Medicaid. LM with HELP. [facility]he needs re-established. He is being worked up for OM-he may need IV ABX-he agrees if needs he is open to snf. It would be difficult to get hhc since he is not established with pcp. Found in [facility] [address] old documents in which he had [employer] and he relays he was at  [facility] rehab? He will also need glucometer, insulin and new referral for OP DM ED.

## 2022-11-26 NOTE — PROGRESS NOTES
[address] [facility] Pharmacokinetic Monitoring Service - Vancomycin    Consulting Provider: Dr. Mariela Ling   Indication: SSTI  Target Concentration: Goal trough of 10-15 mg/L and AUC/LIMA <500 mg*hr/L  Day of Therapy: 2  Additional Antimicrobials: cefepime    Pertinent Laboratory Values: Wt Readings from Last 1 Encounters:   11/25/22 220 lb (99.8 kg)     Temp Readings from Last 1 Encounters:   11/26/22 98.6 °F (37 °C) (Oral)     Estimated Creatinine Clearance: 165 mL/min (A) (based on SCr of 0.61 mg/dL (L)). Recent Labs     11/25/22  1325 11/26/22  0602   CREATININE 1.00 0.61*   WBC 16.3*  --      Procalcitonin: 0.52 on 11/26/22. Pertinent Cultures:  Culture Date Source Results   11/25/22 Blood x2 No growth x12 hrs   11/25/22 Foot  GPCs in pairs   MRSA Nasal Swab: N/A. Non-respiratory infection. Recent vancomycin administrations                     vancomycin 1000 mg IVPB in 250 mL D5W addavial (mg) 1,000 mg New Bag 11/26/22 0332    vancomycin (VANCOCIN) 2,500 mg in dextrose 5 % 500 mL IVPB (mg) 2,500 mg New Bag 11/25/22 1513                    Assessment:  Date/Time Current Dose Concentration Timing of Concentration (h) AUC   11/26/22 at 0602 1 g IV Q12H 15.8 mcg/ml 2 hrs 30 mins 358   Note: Serum concentrations collected for AUC dosing may appear elevated if collected in close proximity to the dose administered, this is not necessarily an indication of toxicity    Plan:  Current dosing regimen is sub-therapeutic  Increase dose to vancomycin IV 1250 mg Q12H (expected new )  Repeat vancomycin concentration ordered for 11/28/22 @ 0600   The current vancomycin regimen is ordered for 7 total days with last dose scheduled for 12/2/22 at 0300.   Pharmacy will continue to monitor patient and adjust therapy as indicated    Thank you for the consult,  Amanda Ugarte [facility]  11/26/2022 1:53 PM

## 2022-11-26 NOTE — PLAN OF CARE
Problem: Safety - Adult  Goal: Free from fall injury  11/26/2022 1736 by Lamar Sousa RN  Outcome: Progressing  Note: Patient remains free from falls. Dressing changes to RT foot managed by Podiatry. Urine output adequate. Patient states pain is tolerable. WBAT with Physical Therapy. Safety measures continued.       Problem: Pain  Goal: Verbalizes/displays adequate comfort level or baseline comfort level  11/26/2022 1736 by Lamar Sousa RN  Outcome: Progressing     Problem: Discharge Planning  Goal: Discharge to home or other facility with appropriate resources  11/26/2022 1736 by Lamar Sousa RN  Outcome: Progressing     Problem: Chronic Conditions and Co-morbidities  Goal: Patient's chronic conditions and co-morbidity symptoms are monitored and maintained or improved  Outcome: Progressing

## 2022-11-26 NOTE — PROGRESS NOTES
[facility]  Office: [address], DO, Lisa Yun, DO, Jr Barron, DO, Peterson Martinez Blood, DO, Bruce Mendosa MD, Genia Conklin MD, Shweta Gan MD, Breann Astorga MD,  Zaire Sahni MD, Thom Amador MD, Saqib Varner, DO, Omar Slaughter MD,  Opal Hernandez MD, Reymundo Rojas MD, Jerardo Miles, DO, Nissa Cruz MD, Irina Thomas MD, Johnny Cronin, DO, Coco Courtney MD, Joanna Burrows MD, Marleen Harada, MD, Radha Virgen MD, Johnny Holstein, DO, Chaka Oliveira MD, Shanti Chiu MD, Mc Gomez CNP,  Keyon Hansen CNP, Eriberto Ng, CNP, Sharon Salter, CNP,  Druscilla Severe, [facility], Shelli Edwards, [facility], [facility], Celeste Mahoney CNP, Lanita Cogan, CNP, Deven Gupta CNP, Josafat Momin PA-C, Carolyn Samaniego, CNS, Radha Humphrey, [facility], Ivana Oneal, CNP, Tresa Busby CNP, Radha Pretty, [facility]    Progress Note    11/26/2022    10:10 AM    Name:   Cata Cruz  MRN:     [id number]     Acct:      [de-identified]   Room:   [address] [place] [address] Day:  1  Admit Date:  11/25/2022  1:14 PM    PCP:   No primary care provider on file. Code Status:  Full Code    Subjective:     C/C:   Chief Complaint   Patient presents with    Toe Pain     \"Think I hit it on something a week or 2 ago\" hx diabetes and not taking prescribed metformin      Interval History Status: not changed. Patient seen examined bedside. No acute issues overnight. Patient still having intermittent toe pain otherwise feeling well. Blood pressure stable, glucose slowly improving. Brief History:     24-year-old male past medical history of schizoaffective disorder, diabetes type 2, ventral hernia, peripheral neuropathy, noncompliant presents to the ER with right foot pain after hitting something on his foot a few weeks prior. Patient seen and evaluated by podiatry, surgery, infectious disease. Currently on vancomycin and cefepime for osteomyelitis. MRI planned for 11/26/2022. Review of Systems:     Review of Systems   Constitutional:  Positive for fatigue. Negative for activity change and fever. HENT:  Negative for congestion, sinus pain and sneezing. Eyes:  Negative for redness and itching. Respiratory:  Negative for cough and shortness of breath. Cardiovascular:  Negative for chest pain, palpitations and leg swelling. Gastrointestinal:  Negative for abdominal distention, constipation and diarrhea. Endocrine: Negative for cold intolerance and heat intolerance. Genitourinary:  Negative for difficulty urinating and dysuria. Musculoskeletal:  Positive for myalgias. Negative for arthralgias. Skin:  Positive for wound. Neurological:  Positive for weakness. Negative for dizziness and syncope. Psychiatric/Behavioral:  Negative for agitation and behavioral problems. Medications:      Allergies:  No Known Allergies    Current Meds:   Scheduled Meds:    nicotine  1 patch TransDERmal Daily    sodium chloride flush  5-40 mL IntraVENous 2 times per day    aspirin EC  81 mg Oral Daily    sodium chloride flush  5-40 mL IntraVENous 2 times per day    enoxaparin  40 mg SubCUTAneous Daily    famotidine  20 mg Oral BID    cefepime  2,000 mg IntraVENous Q8H    insulin lispro  0-16 Units SubCUTAneous TID WC    insulin lispro  0-4 Units SubCUTAneous Nightly    insulin glargine  35 Units SubCUTAneous Nightly    vancomycin  1,000 mg IntraVENous Q12H    vancomycin (VANCOCIN) intermittent dosing (placeholder)   Other RX Placeholder     Continuous Infusions:    sodium chloride      sodium chloride       PRN Meds: sodium chloride flush, sodium chloride, sodium chloride flush, sodium chloride, acetaminophen **OR** acetaminophen, morphine **OR** morphine, oxyCODONE-acetaminophen **OR** oxyCODONE-acetaminophen    Data:     Past Medical History:   has a past medical history of Acute encephalopathy, Acute kidney injury ([facility] [address] Homelessness, Hyperglycemia, Ketonemia, Lactic acidosis, Noncompliance, Schizoaffective disorder ([facility] [address] Tobacco abuse, Type 2 diabetes mellitus with hyperosmolar nonketotic hyperglycemia ([facility] [address] Ventral hernia, Wound of abdomen, and Wound of left ankle. Social History:   reports that he has been smoking cigarettes. He has been smoking an average of 1 pack per day. He has never used smokeless tobacco. He reports that he does not drink alcohol and does not use drugs. Family History:   Family History   Problem Relation Age of Onset    Cancer Mother     Cancer Father        Vitals:  BP (!) 148/97   Pulse 90   Temp 98.8 °F (37.1 °C) (Oral)   Resp 16   Ht 6' 3\" (1.905 m)   Wt 220 lb (99.8 kg)   SpO2 95%   BMI 27.50 kg/m²   Temp (24hrs), Av.5 °F (36.9 °C), Min:98.1 °F (36.7 °C), Max:99.1 °F (37.3 °C)    Recent Labs     22  1711 22  2319 22  0715   POCGLU 338* 319* 274*       I/O (24Hr):     Intake/Output Summary (Last 24 hours) at 2022 1010  Last data filed at 2022  Gross per 24 hour   Intake 1000 ml   Output 700 ml   Net 300 ml       Labs:  Hematology:  Recent Labs     22  1325 22  0602   WBC 16.3*  --    RBC 4.67  --    HGB 14.0  --    HCT 41.4  --    MCV 88.7  --    MCH 30.0  --    MCHC 33.8  --    RDW 11.9  --      --    MPV 10.8  --    SEDRATE 71* 68*   .2* 178.9*   INR  --  1.0     Chemistry:  Recent Labs     22  1325 22  0602   * 135   K 5.2 3.9   CL 83* 99   CO2 25 23   GLUCOSE 691* 215*   BUN 23* 10   CREATININE 1.00 0.61*   ANIONGAP 14 13   LABGLOM >60 >60   CALCIUM 9.7 8.9   TROPHS  --  17     Recent Labs     22  1325 22  1711 22  2319 22  0602 22  0715   PROT  --   --   --  6.1*  --    LABALBU  --   --   --  3.3*  --    AST  --   --   --  10  --    ALT  --   --   --  11  --    ALKPHOS  --   --   --  91  --    BILITOT  --   --   --  0.5  --    URICACID 4.7  --   -- --   --    POCGLU  --  338* 319*  --  274*     ABG:  Lab Results   Component Value Date/Time    FIO2 NOT REPORTED 12/24/2016 02:47 AM     Lab Results   Component Value Date/Time    SPECIAL L FOREARM UNK MLS 11/25/2022 03:10 PM     Lab Results   Component Value Date/Time    CULTURE PENDING 11/25/2022 04:21 PM       Radiology:  XR FOOT RIGHT (MIN 3 VIEWS)    Result Date: 11/25/2022  1. Soft tissue ulceration/irregularity of the great toe. 2. Mild degenerative changes as detailed above. Pes planus. 3. No acute fracture or dislocation. 4. Mild plantar calcaneal spur. Physical Examination:        Physical Exam  Constitutional:       Appearance: He is not ill-appearing or diaphoretic. HENT:      Head: Normocephalic and atraumatic. Nose: Nose normal.      Mouth/Throat:      Mouth: Mucous membranes are moist.   Eyes:      Pupils: Pupils are equal, round, and reactive to light. Cardiovascular:      Rate and Rhythm: Normal rate and regular rhythm. Pulses: Normal pulses. Heart sounds: No murmur heard. Pulmonary:      Effort: Pulmonary effort is normal. No respiratory distress. Breath sounds: No wheezing. Abdominal:      General: Bowel sounds are normal. There is no distension. Palpations: Abdomen is soft. Musculoskeletal:      Cervical back: Neck supple. Right lower leg: No edema. Left lower leg: No edema. Skin:     General: Skin is warm and dry. Capillary Refill: Capillary refill takes less than 2 seconds. Comments: Right lower extremity wound dressed    Neurological:      General: No focal deficit present. Mental Status: He is alert and oriented to person, place, and time.    Psychiatric:         Mood and Affect: Mood normal.         Behavior: Behavior normal.       Assessment:        Hospital Problems             Last Modified POA    * (Principal) Sepsis ([facility] [address] 11/25/2022 Yes    Cellulitis of right foot 11/25/2022 Yes    Hyponatremia 11/26/2022 Yes    Ventral hernia without obstruction or gangrene 11/25/2022 Yes    Hyperglycemia 11/25/2022 Yes    Homeless single person 11/26/2022 Yes    Type 2 diabetes mellitus with hyperglycemia, with long-term current use of insulin ([address] 11/25/2022 Yes    Uncontrolled type 2 diabetes mellitus with hyperglycemia, without long-term current use of insulin ([address] 11/26/2022 Yes    Schizoaffective disorder ([facility] [address] 11/26/2022 Yes    Tobacco abuse 11/25/2022 Yes       Plan:        Concern for right foot wound osteomyelitis. Appreciate podiatry and infectious disease recommendations. Vancomycin and cefepime. MRI pending checklist is actively being completed  Smoker. Encourage cessation. NicoDerm  Diabetes type 2 noncompliant. Insulin sliding scale, Lantus 35 units  Prior hyponatremia secondary to hyperglycemia with corrected sodium of 131. Stop half-normal saline. Schizoaffective disorder. Will restart Zyprexa at 5 mg nightly and monitor mentation  Homeless.   Appreciate social work consult  Restart statin, follow up lipid panel  Ventral hernia, appreciate surgery recommendations, no acute intervention at this time  PT/OT  GI prophylaxis benjamin Calderon MD  11/26/2022  10:10 AM

## 2022-11-27 PROBLEM — M86.9 OSTEOMYELITIS OF RIGHT FOOT (HCC): Status: ACTIVE | Noted: 2022-11-27

## 2022-11-27 LAB
ANION GAP SERPL CALCULATED.3IONS-SCNC: 11 MMOL/L (ref 9–17)
BUN BLDV-MCNC: 10 MG/DL (ref 6–20)
BUN/CREAT BLD: 14 (ref 9–20)
CALCIUM SERPL-MCNC: 9.3 MG/DL (ref 8.6–10.4)
CHLORIDE BLD-SCNC: 96 MMOL/L (ref 98–107)
CHOLESTEROL/HDL RATIO: 4.6
CHOLESTEROL: 220 MG/DL
CO2: 25 MMOL/L (ref 20–31)
CREAT SERPL-MCNC: 0.7 MG/DL (ref 0.7–1.2)
ESTIMATED AVERAGE GLUCOSE: 479 MG/DL
ESTIMATED AVERAGE GLUCOSE: 496 MG/DL
GFR SERPL CREATININE-BSD FRML MDRD: >60 ML/MIN/1.73M2
GLUCOSE BLD-MCNC: 215 MG/DL (ref 75–110)
GLUCOSE BLD-MCNC: 226 MG/DL (ref 70–99)
GLUCOSE BLD-MCNC: 245 MG/DL (ref 75–110)
GLUCOSE BLD-MCNC: 255 MG/DL (ref 75–110)
GLUCOSE BLD-MCNC: 293 MG/DL (ref 75–110)
HBA1C MFR BLD: 18.3 % (ref 4–6)
HBA1C MFR BLD: 18.9 % (ref 4–6)
HCT VFR BLD CALC: 40.3 % (ref 40.7–50.3)
HDLC SERPL-MCNC: 48 MG/DL
HEMOGLOBIN: 13.6 G/DL (ref 13–17)
LDL CHOLESTEROL: 137 MG/DL (ref 0–130)
MCH RBC QN AUTO: 30.2 PG (ref 25.2–33.5)
MCHC RBC AUTO-ENTMCNC: 33.7 G/DL (ref 28.4–34.8)
MCV RBC AUTO: 89.6 FL (ref 82.6–102.9)
NRBC AUTOMATED: 0 PER 100 WBC
PDW BLD-RTO: 11.8 % (ref 11.8–14.4)
PLATELET # BLD: 238 K/UL (ref 138–453)
PMV BLD AUTO: 10.1 FL (ref 8.1–13.5)
POTASSIUM SERPL-SCNC: 3.9 MMOL/L (ref 3.7–5.3)
RBC # BLD: 4.5 M/UL (ref 4.21–5.77)
SEDIMENTATION RATE, ERYTHROCYTE: 65 MM/HR (ref 0–20)
SODIUM BLD-SCNC: 130 MMOL/L (ref 135–144)
SODIUM BLD-SCNC: 132 MMOL/L (ref 135–144)
TRIGL SERPL-MCNC: 177 MG/DL
TROPONIN, HIGH SENSITIVITY: 13 NG/L (ref 0–22)
WBC # BLD: 9.7 K/UL (ref 3.5–11.3)

## 2022-11-27 PROCEDURE — 97530 THERAPEUTIC ACTIVITIES: CPT

## 2022-11-27 PROCEDURE — 6360000002 HC RX W HCPCS: Performed by: STUDENT IN AN ORGANIZED HEALTH CARE EDUCATION/TRAINING PROGRAM

## 2022-11-27 PROCEDURE — 6360000002 HC RX W HCPCS: Performed by: INTERNAL MEDICINE

## 2022-11-27 PROCEDURE — 85027 COMPLETE CBC AUTOMATED: CPT

## 2022-11-27 PROCEDURE — 36415 COLL VENOUS BLD VENIPUNCTURE: CPT

## 2022-11-27 PROCEDURE — 6370000000 HC RX 637 (ALT 250 FOR IP): Performed by: INTERNAL MEDICINE

## 2022-11-27 PROCEDURE — 80061 LIPID PANEL: CPT

## 2022-11-27 PROCEDURE — 97116 GAIT TRAINING THERAPY: CPT

## 2022-11-27 PROCEDURE — 82947 ASSAY GLUCOSE BLOOD QUANT: CPT

## 2022-11-27 PROCEDURE — 2060000000 HC ICU INTERMEDIATE R&B

## 2022-11-27 PROCEDURE — 6370000000 HC RX 637 (ALT 250 FOR IP): Performed by: STUDENT IN AN ORGANIZED HEALTH CARE EDUCATION/TRAINING PROGRAM

## 2022-11-27 PROCEDURE — 80048 BASIC METABOLIC PNL TOTAL CA: CPT

## 2022-11-27 PROCEDURE — 84484 ASSAY OF TROPONIN QUANT: CPT

## 2022-11-27 PROCEDURE — 2580000003 HC RX 258: Performed by: INTERNAL MEDICINE

## 2022-11-27 PROCEDURE — 85652 RBC SED RATE AUTOMATED: CPT

## 2022-11-27 PROCEDURE — 84295 ASSAY OF SERUM SODIUM: CPT

## 2022-11-27 PROCEDURE — 2580000003 HC RX 258: Performed by: STUDENT IN AN ORGANIZED HEALTH CARE EDUCATION/TRAINING PROGRAM

## 2022-11-27 PROCEDURE — 97162 PT EVAL MOD COMPLEX 30 MIN: CPT

## 2022-11-27 PROCEDURE — 6370000000 HC RX 637 (ALT 250 FOR IP): Performed by: FAMILY MEDICINE

## 2022-11-27 RX ORDER — DEXTROSE MONOHYDRATE 100 MG/ML
INJECTION, SOLUTION INTRAVENOUS CONTINUOUS PRN
Status: DISCONTINUED | OUTPATIENT
Start: 2022-11-27 | End: 2022-11-30 | Stop reason: HOSPADM

## 2022-11-27 RX ORDER — INSULIN GLARGINE 100 [IU]/ML
25 INJECTION, SOLUTION SUBCUTANEOUS 2 TIMES DAILY
Status: DISCONTINUED | OUTPATIENT
Start: 2022-11-27 | End: 2022-11-28

## 2022-11-27 RX ADMIN — INSULIN LISPRO 4 UNITS: 100 INJECTION, SOLUTION INTRAVENOUS; SUBCUTANEOUS at 12:34

## 2022-11-27 RX ADMIN — OXYCODONE AND ACETAMINOPHEN 1 TABLET: 325; 5 TABLET ORAL at 18:26

## 2022-11-27 RX ADMIN — ROSUVASTATIN CALCIUM 40 MG: 40 TABLET, FILM COATED ORAL at 20:53

## 2022-11-27 RX ADMIN — INSULIN GLARGINE 25 UNITS: 100 INJECTION, SOLUTION SUBCUTANEOUS at 20:53

## 2022-11-27 RX ADMIN — ENOXAPARIN SODIUM 40 MG: 100 INJECTION SUBCUTANEOUS at 08:55

## 2022-11-27 RX ADMIN — CEFEPIME 2000 MG: 2 INJECTION, POWDER, FOR SOLUTION INTRAVENOUS at 05:24

## 2022-11-27 RX ADMIN — INSULIN LISPRO 8 UNITS: 100 INJECTION, SOLUTION INTRAVENOUS; SUBCUTANEOUS at 17:21

## 2022-11-27 RX ADMIN — SODIUM CHLORIDE: 9 INJECTION, SOLUTION INTRAVENOUS at 03:23

## 2022-11-27 RX ADMIN — FAMOTIDINE 20 MG: 20 TABLET, FILM COATED ORAL at 08:53

## 2022-11-27 RX ADMIN — CEFEPIME 2000 MG: 2 INJECTION, POWDER, FOR SOLUTION INTRAVENOUS at 12:34

## 2022-11-27 RX ADMIN — VANCOMYCIN HYDROCHLORIDE 1250 MG: 5 INJECTION, POWDER, LYOPHILIZED, FOR SOLUTION INTRAVENOUS at 16:21

## 2022-11-27 RX ADMIN — SODIUM CHLORIDE: 9 INJECTION, SOLUTION INTRAVENOUS at 05:23

## 2022-11-27 RX ADMIN — FAMOTIDINE 20 MG: 20 TABLET, FILM COATED ORAL at 20:53

## 2022-11-27 RX ADMIN — OLANZAPINE 5 MG: 5 TABLET, FILM COATED ORAL at 20:53

## 2022-11-27 RX ADMIN — ASPIRIN 81 MG: 81 TABLET, COATED ORAL at 08:53

## 2022-11-27 RX ADMIN — VANCOMYCIN HYDROCHLORIDE 1250 MG: 5 INJECTION, POWDER, LYOPHILIZED, FOR SOLUTION INTRAVENOUS at 03:24

## 2022-11-27 RX ADMIN — INSULIN GLARGINE 25 UNITS: 100 INJECTION, SOLUTION SUBCUTANEOUS at 08:53

## 2022-11-27 RX ADMIN — CEFEPIME 2000 MG: 2 INJECTION, POWDER, FOR SOLUTION INTRAVENOUS at 21:00

## 2022-11-27 RX ADMIN — INSULIN LISPRO 4 UNITS: 100 INJECTION, SOLUTION INTRAVENOUS; SUBCUTANEOUS at 08:53

## 2022-11-27 ASSESSMENT — PAIN SCALES - GENERAL: PAINLEVEL_OUTOF10: 6

## 2022-11-27 ASSESSMENT — ENCOUNTER SYMPTOMS
CHEST TIGHTNESS: 0
CONSTIPATION: 0
SHORTNESS OF BREATH: 0
ABDOMINAL PAIN: 0
WHEEZING: 0
NAUSEA: 0
BLOOD IN STOOL: 0
DIARRHEA: 0
VOMITING: 0
COUGH: 0

## 2022-11-27 NOTE — PROGRESS NOTES
Physical Therapy  Facility/Department: [facility] PROGRESSIVE CARE  Physical Therapy Initial Assessment    Name: Mahendra Yanez  : 1965  MRN: [id number]  Date of Service: 2022    Discharge Recommendations:  Patient would benefit from continued therapy after discharge   PT Equipment Recommendations  Equipment Needed: Yes  Mobility Devices: Lord Nelson: Rolling  Due to recent hospitalization and medical condition, pt would benefit from additional intermittent skilled therapy at time of discharge. Please refer to the AM-PAC score for current functional status. HPI (per chart): Mahendra Yanez is a 49 y.o. male seen at [facility] by podiatry for a right hallux with associated pain. Patient states that he has been dealing with the right hallux wound for around 2 weeks and just recently developed pain and redness. He says that he has noticed increase drainage from the area and became increasing concerned for infection. He denies any systemic signs of infection including nausea, vomiting, fever or chills. He is a diabetic patient with blood sugars currently 691. Says that he does not take his medication. Patient Diagnosis(es): The primary encounter diagnosis was Hyperglycemia. A diagnosis of Cellulitis of right foot was also pertinent to this visit. Past Medical History:  has a past medical history of Acute encephalopathy, Acute kidney injury ([address] Homelessness, Hyperglycemia, Ketonemia, Lactic acidosis, Noncompliance, Schizoaffective disorder ([address] Tobacco abuse, Type 2 diabetes mellitus with hyperosmolar nonketotic hyperglycemia ([address] Ventral hernia, Wound of abdomen, and Wound of left ankle. Past Surgical History:  has a past surgical history that includes Arm Surgery. Assessment   Body Structures, Functions, Activity Limitations Requiring Skilled Therapeutic Intervention: Decreased functional mobility ; Decreased strength;Decreased safe awareness;Decreased balance;Decreased to enter with rails  Entrance Stairs - Number of Steps: 4  Entrance Stairs - Rails: Left  Bathroom Shower/Tub: Walk-in shower  Bathroom Toilet: Standard  Bathroom Equipment: Grab bars in shower, Grab bars around toilet  Home Equipment:  (No DME)  Has the patient had two or more falls in the past year or any fall with injury in the past year?: No  Receives Help From: Family  ADL Assistance: [address]: Independent  Ambulation Assistance: Independent  Transfer Assistance: Independent  Active : No  Patient's  Info: Per pt - needs to get 's license back - has been riding his bike for community mobility needs  Occupation: Unemployed  Type of Occupation: Wants to work at [employer] eventually  [address]: Going to [place]  [address]: Within Functional Limits  Hearing  Hearing: Within functional limits    Cognition   Orientation  Overall Orientation Status: Within Functional Limits  Cognition  Overall Cognitive Status: WFL  Arousal/Alertness: Appropriate responses to stimuli  Following Commands: Follows multistep commands consistently; Follows multistep commands with repitition; Follows multistep commands with increased time  Attention Span: Attends with cues to redirect  Memory: Decreased recall of precautions  Safety Judgement: Decreased awareness of need for safety;Decreased awareness of need for assistance  Problem Solving: Decreased awareness of errors;Assistance required to identify errors made;Assistance required to generate solutions  Insights: Decreased awareness of deficits  Initiation: Requires cues for some  Sequencing: Requires cues for some  Cognition Comment: Flat affect, slow to espond when lying in bed, but more alert and engaged when sitting up.      Objective   Heart Rate: 87  Heart Rate Source: Monitor  BP: 130/84  BP Location: Left upper arm  Patient Position: Supine  MAP (Calculated): 99  Resp: 16  SpO2: 96 %  O2 Device: None (Room air)     Observation/Palpation  Posture: Fair (mildly flexed posture in standing, improves with verbal cues)  Observation: Flat affect, IV, right foot bandaged and ace wrapped. AROM RLE (degrees)  RLE AROM: WFL  AROM LLE (degrees)  LLE AROM : WFL  Strength RLE  Comment: 4/5  Strength LLE  Comment: 4/5           Bed mobility  Rolling to Left: Independent  Rolling to Right: Independent  Supine to Sit: Independent  Scooting: Independent  Transfers  Sit to Stand: Contact guard assistance  Stand to Sit: Contact guard assistance  Bed to Chair: Contact guard assistance  Stand Pivot Transfers: Contact guard assistance  Comment: verbal cues to push up from chair and not pull up on walker, and cues to reach back for chair before sitting. Ambulation  Surface: Level tile  Device: Rolling Walker  Assistance: Minimal assistance  Quality of Gait: Patient initially ambulating with hip flexion to \"take wieght off of foot\". Patient educated to stand upright and use UEs to take weight off of right foot as needed. Patient posture improved with ambulation. Patient amb with decreased stance on RLE and no toe off. Gait Deviations: Slow Monica; Increased KAMARI; Decreased step length;Decreased step height;Decreased head and trunk rotation  Distance: 50 feet     Balance  Sitting - Static: Good  Sitting - Dynamic: Good  Standing - Static: Good;- (RW)  Standing - Dynamic: Fair;+ (RW)           OutComes Score                                                  AM-PAC Score             Tinneti Score       Goals  Short Term Goals  Time Frame for Short Term Goals: 12 visits  Short Term Goal 1: Patient will be indep with transfers. Short Term Goal 2: Patient will amb 100 feet with RW and indep. Short Term Goal 3: Patient will have good- standng balance. Short Term Goal 4: Patient will tolerate 30 minutes of ther-ex and ther-act. Short Term Goal 5: Patient will negotiate 4 stairs with rail and CGA.   Patient Goals   Patient Goals : none stated       Education  Patient Education  Education Given To: Patient  Education Provided: Role of Therapy;Plan of Care;Transfer Training; Fall Prevention Strategies  Education Provided Comments: Gait training  Education Method: Verbal;Demonstration  Barriers to Learning: None  Education Outcome: Verbalized understanding;Continued education needed      Therapy Time   Individual Concurrent Group Co-treatment   Time In 5786         Time Out 1313         Minutes 38         Timed Code Treatment Minutes: [address], PT

## 2022-11-27 NOTE — PLAN OF CARE
Problem: Discharge Planning  Goal: Discharge to home or other facility with appropriate resources  11/27/2022 0108 by Purnima Conde RN  Outcome: Progressing  Flowsheets (Taken 11/26/2022 2000)  Discharge to home or other facility with appropriate resources:   Identify barriers to discharge with patient and caregiver   Arrange for needed discharge resources and transportation as appropriate   Identify discharge learning needs (meds, wound care, etc)  11/26/2022 1736 by Pamela Chinchilla RN  Outcome: Progressing     Problem: Pain  Goal: Verbalizes/displays adequate comfort level or baseline comfort level  11/27/2022 0108 by Purnima Conde RN  Outcome: Progressing  11/26/2022 1736 by Pamela Chinchilla RN  Outcome: Progressing     Problem: Safety - Adult  Goal: Free from fall injury  11/27/2022 0108 by Purnima Conde RN  Outcome: Progressing  11/26/2022 1736 by Pamela Chinchilla RN  Outcome: Progressing  Note: Patient remains free from falls. Dressing changes to RT foot managed by Podiatry. Urine output adequate. Patient states pain is tolerable. WBAT with Physical Therapy. Safety measures continued.       Problem: Chronic Conditions and Co-morbidities  Goal: Patient's chronic conditions and co-morbidity symptoms are monitored and maintained or improved  11/27/2022 0108 by Purnima Conde RN  Outcome: Progressing  Flowsheets (Taken 11/26/2022 2000)  Care Plan - Patient's Chronic Conditions and Co-Morbidity Symptoms are Monitored and Maintained or Improved:   Monitor and assess patient's chronic conditions and comorbid symptoms for stability, deterioration, or improvement   Collaborate with multidisciplinary team to address chronic and comorbid conditions and prevent exacerbation or deterioration   Update acute care plan with appropriate goals if chronic or comorbid symptoms are exacerbated and prevent overall improvement and discharge  11/26/2022 1736 by Pamela Chinchilla RN  Outcome: Progressing     Problem: Nutrition Deficit:  Goal: Optimize nutritional status  Outcome: Progressing

## 2022-11-27 NOTE — PROGRESS NOTES
[facility] -   Infectious diseases evaluation  admission date 11/25/2022    reason for consultation:   Right foot infection    Impression :   Current:  Right hallux ulcer /cellulitis with suspected osteomyelitis of the first distal phalanx. Ventral hernia  Diabetes mellitus  Schizoaffective disorder  Smoking    Recommendations   IV vancomycin and cefepime  Follow cultures  Right foot MRI reviewed suggestive of osteomyelitis of the first distal phalanx  Arterial Doppler reviewedRight ROMARIO: 0.75 Left ROMARIO: 0.99  Podiatry following, likely needs surgical intervention, consider partial amputation of the right big toe. Follow C-reactive protein and sedimentation rate. Follow CBC renal function. Infection Control Recommendations   [place] Precautions    Antimicrobial Stewardship Recommendations   Simplification of therapy  Targeted therapy      History of Present Illness:   Initial history:  Osei Conte is a 56y.o.-year-old male presented to hospital with right foot pain worsening over 2 weeks associated with open wound to the right hallux purulent drainage and redness that is extending to the foot dorsum. Symptoms severe, no alleviating or aggravating factors. He is afebrile. The patient had history of uncontrolled diabetes. He was noted to have ventral hernia, surgery has been consulted. He denied abdominal pain, no nausea or vomiting, no diarrhea, no cough or shortness of breath, no other complaints. Initial WBC was 16.3, glucose 691, creatinine 1, C-reactive protein 196, sedimentation rate 71  Wound swab showed few gram-positive cocci in pairs on gram stain ,pending cultures. Initial lactic acid was 1.5    Interval changes  11/27/2022   He is feeling better, right foot pain better controlled, denied fever or chills, denied nausea or vomiting, no other complaints.     Patient Vitals for the past 8 hrs:   BP Temp Temp src Pulse Resp SpO2   11/27/22 1208 130/84 97.5 °F (36.4 °C) Axillary 87 16 96 %   11/27/22 0814 129/87 97.7 °F (36.5 °C) Axillary 90 16 97 %   11/27/22 0523 128/85 98.4 °F (36.9 °C) Axillary 95 18 97 %               I have personally reviewed the past medical history, past surgical history, medications, social history, and family history, and I haveupdated the database accordingly. Allergies:   Patient has no known allergies. Review of Systems:     Review of Systems  As per history of present illness, other than above 12 system review was negative. Physical Examination :       Physical Exam  Constitutional:       General: He is not in acute distress. HENT:      Head: Normocephalic and atraumatic. Right Ear: External ear normal.      Left Ear: External ear normal.   Eyes:      General: No scleral icterus. Conjunctiva/sclera: Conjunctivae normal.   Cardiovascular:      Rate and Rhythm: Normal rate and regular rhythm. Heart sounds: Normal heart sounds. Pulmonary:      Effort: Pulmonary effort is normal. No respiratory distress. Abdominal:      Palpations: Abdomen is soft. Comments: Large ventral hernia   Musculoskeletal:      Right lower leg: No edema. Left lower leg: No edema. Lymphadenopathy:      Cervical: No cervical adenopathy. Skin:     Coloration: Skin is not jaundiced. Findings: No bruising. Comments: Right foot dressing was not removed today   Neurological:      General: No focal deficit present. Mental Status: He is alert. Mental status is at baseline.        Past Medical History:     Past Medical History:   Diagnosis Date    Acute encephalopathy     Acute kidney injury ([facility] [address]     Homelessness     Hyperglycemia     Ketonemia     Lactic acidosis     Noncompliance     Schizoaffective disorder ([facility] [address]     Tobacco abuse     Type 2 diabetes mellitus with hyperosmolar nonketotic hyperglycemia ([facility] [address] 10/02/2016    Ventral hernia     Wound of abdomen     Wound of left ankle        Past Surgical History:     Past Surgical History:   Procedure Laterality Date    ARM SURGERY         Medications:      insulin glargine  25 Units SubCUTAneous BID    nicotine  1 patch TransDERmal Daily    OLANZapine  5 mg Oral Nightly    rosuvastatin  40 mg Oral Nightly    vancomycin  1,250 mg IntraVENous Q12H    sodium chloride flush  5-40 mL IntraVENous 2 times per day    aspirin EC  81 mg Oral Daily    sodium chloride flush  5-40 mL IntraVENous 2 times per day    enoxaparin  40 mg SubCUTAneous Daily    famotidine  20 mg Oral BID    cefepime  2,000 mg IntraVENous Q8H    insulin lispro  0-16 Units SubCUTAneous TID WC    insulin lispro  0-4 Units SubCUTAneous Nightly    vancomycin (VANCOCIN) intermittent dosing (placeholder)   Other RX Placeholder       Social History:     Social History     Socioeconomic History    Marital status:      Spouse name: Not on file    Number of children: Not on file    Years of education: Not on file    Highest education level: Not on file   Occupational History    Not on file   Tobacco Use    Smoking status: Every Day     Packs/day: 1.00     Types: Cigarettes    Smokeless tobacco: Never   Vaping Use    Vaping Use: Never used   Substance and Sexual Activity    Alcohol use: No    Drug use: No    Sexual activity: Not on file   Other Topics Concern    Not on file   Social History Narrative    Not on file     Social Determinants of Health     Financial Resource Strain: Not on file   Food Insecurity: Not on file   Transportation Needs: Not on file   Physical Activity: Not on file   Stress: Not on file   Social Connections: Not on file   Intimate Partner Violence: Not on file   Housing Stability: Not on file       Family History:     Family History   Problem Relation Age of Onset    Cancer Mother     Cancer Father       Medical Decision Making:   I have independently reviewed/ordered the following labs:    CBC with Differential:   Recent Labs     11/25/22  1325 11/27/22  0507   WBC 16.3* 9.7   HGB 14.0 13.6   HCT 41.4 40.3*    238   LYMPHOPCT 9*  --    MONOPCT 10  --        BMP:  Recent Labs     11/26/22  0602 11/26/22  1236 11/27/22  0009 11/27/22  0507      < > 130* 132*   K 3.9  --   --  3.9   CL 99  --   --  96*   CO2 23  --   --  25   BUN 10  --   --  10   CREATININE 0.61*  --   --  0.70    < > = values in this interval not displayed. Hepatic Function Panel:   Recent Labs     11/26/22  0602   PROT 6.1*   LABALBU 3.3*   BILITOT 0.5   ALKPHOS 91   ALT 11   AST 10       No results for input(s): RPR in the last 72 hours. No results for input(s): HIV in the last 72 hours. No results for input(s): BC in the last 72 hours. Lab Results   Component Value Date/Time    CREATININE 0.70 11/27/2022 05:07 AM    GLUCOSE 226 11/27/2022 05:07 AM    GLUCOSE 595 03/11/2020 10:15 PM       Detailed results: Thank you for allowing us to participate in the care of this patient. Please call with questions. This note is created with the assistance of a speech recognition program.  While intending to generate adocument that actually reflects the content of the visit, the document can still have some errors including those of syntax and sound a like substitutions which may escape proof reading. It such instances, actual meaningcan be extrapolated by contextual diversion.     Kain Sanchez MD  Office: [phone]  Perfect serve / office [phone]

## 2022-11-27 NOTE — PROGRESS NOTES
[facility] Pharmacokinetic Monitoring Service - Vancomycin    Consulting Provider: Dr. Cherelle Coburn   Indication: SSTI  Target Concentration: Goal trough of 10-15 mg/L and AUC/LIMA <500 mg*hr/L  Day of Therapy: 3  Additional Antimicrobials: cefepime    Pertinent Laboratory Values: Wt Readings from Last 1 Encounters:   11/25/22 220 lb (99.8 kg)     Temp Readings from Last 1 Encounters:   11/26/22 98.6 °F (37 °C) (Oral)     Estimated Creatinine Clearance: 165 mL/min (A) (based on SCr of 0.61 mg/dL (L)). Recent Labs     11/25/22  1325 11/26/22  0602 11/27/22  0507   CREATININE 1.00 0.61* 0.70   WBC 16.3*  --  9.7     Procalcitonin: 0.52 on 11/26/22. Pertinent Cultures:  Culture Date Source Results   11/25/22 Blood x2 No growth    11/25/22 Foot  GPCs in pairs/pending   MRSA Nasal Swab: N/A. Non-respiratory infection. Recent vancomycin administrations                     vancomycin 1000 mg IVPB in 250 mL D5W addavial (mg) 1,000 mg New Bag 11/26/22 0332    vancomycin (VANCOCIN) 2,500 mg in dextrose 5 % 500 mL IVPB (mg) 2,500 mg New Bag 11/25/22 1513                    Assessment:  Date/Time Current Dose Concentration Timing of Concentration (h) AUC   11/26/22 at 0602 1 g IV Q12H 15.8 mcg/ml 2 hrs 30 mins 358   Note: Serum concentrations collected for AUC dosing may appear elevated if collected in close proximity to the dose administered, this is not necessarily an indication of toxicity    Plan:  Current dosing regimen is vancomycin 1250mg ivpb q12h . Repeat vancomycin concentration ordered for 11/28/22 @ 0600   The current vancomycin regimen is ordered for 7 total days with last dose scheduled for 12/2/22 at 0300.   Pharmacy will continue to monitor patient and adjust therapy as indicated    Thank you for the consult,  BEAU Marx [facility]  11/27/2022 7:29 AM

## 2022-11-27 NOTE — PROGRESS NOTES
Pt refuses telemetry. Also, pt was smoking in his room. Pt educated regarding the matter. Cigarettes, lighter and cigar in pt lock up.

## 2022-11-27 NOTE — PLAN OF CARE
Problem: Safety - Adult  Goal: Free from fall injury  Outcome: Progressing  Note: Patient remains free from falls this shift. Daily wound dressing changes performed. WBAT to right foot with therapy. Pain is tolerable. Safety measures continued.       Problem: Pain  Goal: Verbalizes/displays adequate comfort level or baseline comfort level  Outcome: Progressing     Problem: Discharge Planning  Goal: Discharge to home or other facility with appropriate resources  Outcome: Progressing     Problem: Chronic Conditions and Co-morbidities  Goal: Patient's chronic conditions and co-morbidity symptoms are monitored and maintained or improved  Outcome: Progressing

## 2022-11-27 NOTE — PROGRESS NOTES
Progress Note  Podiatric Medicine and Surgery     Subjective     CC: Right hallux wound with pain     Pt took off his dressing and put toothpaste on his foot to \"clean\"  Does not complain pain    HPI :  Dirk Marmolejo is a 49 y.o. male seen at [facility] ED by podiatry for a right hallux with associated pain. Patient states that he has been dealing with the right hallux wound for around 2 weeks and just recently developed pain and redness. He says that he has noticed increase drainage from the area and became increasing concerned for infection. He denies any systemic signs of infection including nausea, vomiting, fever or chills. He is a diabetic patient with blood sugars currently 691. Says that he does not take his medication. PCP is Cole Diaz MD    ROS: Denies N/V/F/C/SOB/CP. Otherwise negative except at stated in the HPI.      Medications:  Scheduled Meds:   insulin glargine  25 Units SubCUTAneous BID    nicotine  1 patch TransDERmal Daily    OLANZapine  5 mg Oral Nightly    rosuvastatin  40 mg Oral Nightly    vancomycin  1,250 mg IntraVENous Q12H    sodium chloride flush  5-40 mL IntraVENous 2 times per day    aspirin EC  81 mg Oral Daily    sodium chloride flush  5-40 mL IntraVENous 2 times per day    enoxaparin  40 mg SubCUTAneous Daily    famotidine  20 mg Oral BID    cefepime  2,000 mg IntraVENous Q8H    insulin lispro  0-16 Units SubCUTAneous TID WC    insulin lispro  0-4 Units SubCUTAneous Nightly    vancomycin (VANCOCIN) intermittent dosing (placeholder)   Other RX Placeholder       Continuous Infusions:   dextrose      sodium chloride      sodium chloride 10 mL/hr at 11/27/22 0523       PRN Meds:glucose, dextrose bolus **OR** dextrose bolus, glucagon (rDNA), dextrose, sodium chloride flush, sodium chloride, sodium chloride flush, sodium chloride, acetaminophen **OR** acetaminophen, morphine **OR** morphine, oxyCODONE-acetaminophen **OR** oxyCODONE-acetaminophen    Objective Vitals:  Patient Vitals for the past 8 hrs:   BP Temp Temp src Pulse Resp SpO2   22 0814 129/87 97.7 °F (36.5 °C) Axillary 90 16 97 %   22 0523 128/85 98.4 °F (36.9 °C) Axillary 95 18 97 %       Average, Min, and Max for last 24 hours Vitals:  TEMPERATURE:  Temp  Av.3 °F (36.8 °C)  Min: 97.7 °F (36.5 °C)  Max: 98.6 °F (37 °C)    RESPIRATIONS RANGE: Resp  Av.7  Min: 16  Max: 18    PULSE RANGE: Pulse  Av.7  Min: 81  Max: 104    BLOOD PRESSURE RANGE:  Systolic (43DGR), FLI:253 , Min:128 , VOD:499   ; Diastolic (45DDO), OZY:71, Min:85, Max:98      PULSE OXIMETRY RANGE: SpO2  Av %  Min: 95 %  Max: 99 %    I/O last 3 completed shifts: In: 266.2 [IV Piggyback:266.2]  Out: 1900 [Urine:1900]    CBC:  Recent Labs     22  1325 22  0602 22  0507   WBC 16.3*  --  9.7   HGB 14.0  --  13.6   HCT 41.4  --  40.3*     --  238   .2* 178.9*  --           BMP:  Recent Labs     22  1325 22  0602 22  1236 22  1833 22  0009 22  0507   * 135   < > 129* 130* 132*   K 5.2 3.9  --   --   --  3.9   CL 83* 99  --   --   --  96*   CO2 25 23  --   --   --  25   BUN 23* 10  --   --   --  10   CREATININE 1.00 0.61*  --   --   --  0.70   GLUCOSE 691* 215*  --   --   --  226*   CALCIUM 9.7 8.9  --   --   --  9.3    < > = values in this interval not displayed. Coags:  Recent Labs     22  0602   PROT 6.1*   INR 1.0         Lab Results   Component Value Date    SEDRATE 65 (H) 2022     Recent Labs     22  1325 22  0602   .2* 178.9*         Lower Extremity Physical Exam:  Vascular: DP and PT pulses are lightly palpable. CFT <3 seconds to all digits. Hair growth is absent to the level of the digits. Edema noted to right foot. Neuro: Saph/sural/SP/DP/plantar sensation diminished to light touch. Musculoskeletal: Muscle strength is 4/5 to all lower extremity muscle groups. Gross deformity is absent. Dermatologic:  Erythema with increased warmth noted to dorsal aspect of patients right foot. Full thickness ulcer #1 located to the distal aspect of the right hallux. Base has a mix of granular, fibrotic and necrotic tissue. Periwound skin is hyperkeratotic. No purulent drainage expressed with no associated mal odor. Erythema present with associated increase in warmth. Probes to bone and tracks dorsal laterally. No fluctuance, crepitus, or induration. Interdigital maceration absent. Clinical Images:            Imaging:   MRI FOOT RIGHT WO CONTRAST   Final Result   1. Soft tissue ulceration at the distal aspect of the 1st toe with underlying   cellulitis. No well-defined drainable fluid collection identified. 2. Osteomyelitis of the tuft and shaft of the 1st distal phalanx. VL LOWER EXTREMITY ARTERIAL SEGMENTAL PRESSURES W PPG   Final Result      XR FOOT RIGHT (MIN 3 VIEWS)   Final Result   1. Soft tissue ulceration/irregularity of the great toe. 2. Mild degenerative changes as detailed above. Pes planus. 3. No acute fracture or dislocation. 4. Mild plantar calcaneal spur. Cultures:   Taken at bedside in the ED. final results pending    Assessment   Dirk Marmolejo is a 56 y.o. male with   Diabetic Ulcer down to the level of bone, right hallux  Osteomyelitis, right hallux  Cellulitis, right foot  Uncontrolled type II DM with peripheral neuropathy. Principal Problem:    Sepsis ([address]  Active Problems:    Cellulitis of right foot    Ventral hernia without obstruction or gangrene    Hyperglycemia    Homeless single person    Type 2 diabetes mellitus with hyperglycemia, with long-term current use of insulin ([address]    Uncontrolled type 2 diabetes mellitus with hyperglycemia, without long-term current use of insulin ([facility])    Schizoaffective disorder ([address]    Tobacco abuse  Resolved Problems:    * No resolved hospital problems.  *       Plan     Patient examined and evaluated at bedside   Treatment options discussed in detail with the patient  Patient has been admitted to the hospital under the IM team.  Radiographs reviewed and discussed in detail with patient. Erosive changes noted to the distal aspect of the distal hallucal phalanx. Concerns for osteomyelitis. No abscess or soft tissue gas noted        Dressing applied to right foot: Betadine wet to dry, Kerlix and tape. Emphasize the importance of not taking down the dressing himself. If patient wants to clean his foot, he may use sterile saline water. Patient shows understanding. Patient is currently receiving Cefepime and Vanco. Order placed for ID for antibiotic management. Appreciate recommendations  Noninvasive vascular studies show the following:  Right ROMARIO: 0.75  Left ROMARIO: 0.99  MRI reviewed: Osteomyelitis to the distal phalanx of the right hallux  Surgery will be scheduled outpatient per attending. Patient will need to follow up wound care ASAP after discharge. Perform dressing change daily or at least every other day.    WBAT to Right lower extremity  Discussed with Dr. Bárbara Canas DPM   Podiatric Medicine & Surgery   11/27/2022 at 9:49 AM

## 2022-11-27 NOTE — PROGRESS NOTES
[facility]  Office: [address], DO, Trino Blood, DO, Short Ana Paulat, DO, Martin Galvan Blood, DO, Laura Licea MD, Cuong Liu MD, Dejan Espinosa MD, Kranthi Greenberg MD,  Erik Oscar MD, Hortensia Gonzalez MD, Comfort Morse, DO, Leonardo Rivas MD,  Laura Roberts MD, Cynthia Dior MD, Sky Hall DO, Clint Sethi MD, Tracy Hendricks MD, Laura Palumbo DO, Sayda Ovalles MD, Adele Lopez MD, Ken Castellano MD, Hector Courtney MD, Miranda Lovett DO, Darren Demarco MD, Agueda Devries MD, Sriram Perkins, Haris Byrne, CNP, Heidy Good, CNP, Parvin Ledezma, CNP,  Jadyn Knight, JONNA, Francisco Morgan, CNP, Tammy Pedraza, CNP, Raymundo Marie, CNP, Liang Cruz, CNP, Juan Miguel Fu, CNP, Bruce France PA-C, Tiara Jones, CNS, Bolivar Rodriguez, JONNA, Edelmira Dean, CNP, Danna Mace CNP, Addis Díaz, CNP         [facility]    Progress Note    11/27/2022    7:33 AM    Name:   Minerva Lobo  MRN:     [id number]     Acct:      [de-identified]   Room:   [address] [place] [address] Day:  2  Admit Date:  11/25/2022  1:14 PM    PCP:   No primary care provider on file. Code Status:  Full Code    Subjective:     C/C:   Chief Complaint   Patient presents with    Toe Pain     \"Think I hit it on something a week or 2 ago\" hx diabetes and not taking prescribed metformin      Interval History Status: not changed. Patient seen and examined at bedside, no acute events overnight. MRI with evidence of osteomyelitis Patient denies any chest pain, shortness of breath, chills, fevers, nausea or vomiting.   Patient vitals, labs and all providers notes were reviewed,from overnight shift and morning updates were noted and discussed with the nurse    Brief History:     Per my partner  77-year-old male past medical history of schizoaffective disorder, diabetes type 2, ventral hernia, peripheral neuropathy, noncompliant presents to the ER with right foot pain after hitting something on his foot a few weeks prior. Patient seen and evaluated by podiatry, surgery, infectious disease. Currently on vancomycin and cefepime for osteomyelitis. MRI planned for 11/26/2022. Review of Systems:     Review of Systems   Constitutional:  Positive for activity change, appetite change and fatigue. Negative for chills, diaphoresis and fever. HENT:  Negative for congestion. Eyes:  Negative for visual disturbance. Respiratory:  Negative for cough, chest tightness, shortness of breath and wheezing. Cardiovascular:  Negative for chest pain, palpitations and leg swelling. Gastrointestinal:  Negative for abdominal pain, blood in stool, constipation, diarrhea, nausea and vomiting. Genitourinary:  Negative for difficulty urinating. Musculoskeletal:  Positive for arthralgias and gait problem. Neurological:  Positive for weakness. Negative for dizziness, light-headedness, numbness and headaches. All other systems reviewed and are negative. Medications:      Allergies:  No Known Allergies    Current Meds:   Scheduled Meds:    nicotine  1 patch TransDERmal Daily    OLANZapine  5 mg Oral Nightly    rosuvastatin  40 mg Oral Nightly    vancomycin  1,250 mg IntraVENous Q12H    sodium chloride flush  5-40 mL IntraVENous 2 times per day    aspirin EC  81 mg Oral Daily    sodium chloride flush  5-40 mL IntraVENous 2 times per day    enoxaparin  40 mg SubCUTAneous Daily    famotidine  20 mg Oral BID    cefepime  2,000 mg IntraVENous Q8H    insulin lispro  0-16 Units SubCUTAneous TID WC    insulin lispro  0-4 Units SubCUTAneous Nightly    insulin glargine  35 Units SubCUTAneous Nightly    vancomycin (VANCOCIN) intermittent dosing (placeholder)   Other RX Placeholder     Continuous Infusions:    sodium chloride      sodium chloride 10 mL/hr at 11/27/22 0509     PRN Meds: sodium chloride flush, sodium chloride, sodium chloride flush, sodium chloride, acetaminophen **OR** acetaminophen, morphine **OR** morphine, oxyCODONE-acetaminophen **OR** oxyCODONE-acetaminophen    Data:     Past Medical History:   has a past medical history of Acute encephalopathy, Acute kidney injury ([facility] Homelessness, Hyperglycemia, Ketonemia, Lactic acidosis, Noncompliance, Schizoaffective disorder ([facility] Tobacco abuse, Type 2 diabetes mellitus with hyperosmolar nonketotic hyperglycemia ([facility] Ventral hernia, Wound of abdomen, and Wound of left ankle. Social History:   reports that he has been smoking cigarettes. He has been smoking an average of 1 pack per day. He has never used smokeless tobacco. He reports that he does not drink alcohol and does not use drugs. Family History:   Family History   Problem Relation Age of Onset    Cancer Mother     Cancer Father        Vitals:  /85   Pulse 95   Temp 98.4 °F (36.9 °C) (Axillary)   Resp 18   Ht 6' 3\" (1.905 m)   Wt 220 lb (99.8 kg)   SpO2 97%   BMI 27.50 kg/m²   Temp (24hrs), Av.4 °F (36.9 °C), Min:98.2 °F (36.8 °C), Max:98.6 °F (37 °C)    Recent Labs     22  0715 22  1103 22  1612 22   POCGLU 274* 297* 248* 239*       I/O (24Hr):     Intake/Output Summary (Last 24 hours) at 2022 0733  Last data filed at 2022 1700  Gross per 24 hour   Intake 266.2 ml   Output 1200 ml   Net -933.8 ml       Labs:  Hematology:  Recent Labs     22  1325 22  0602 22  0507   WBC 16.3*  --  9.7   RBC 4.67  --  4.50   HGB 14.0  --  13.6   HCT 41.4  --  40.3*   MCV 88.7  --  89.6   MCH 30.0  --  30.2   MCHC 33.8  --  33.7   RDW 11.9  --  11.8     --  238   MPV 10.8  --  10.1   SEDRATE 71* 68* 65*   .2* 178.9*  --    INR  --  1.0  --      Chemistry:  Recent Labs     22  1325 22  0602 22  1236 22  1833 22  0009 22  0507   * 135   < > 129* 130* 132*   K 5.2 3.9  --   --   --  3.9   CL 83* 99  --   --   --  96*   CO2 25 23 --   --   --  25   GLUCOSE 691* 215*  --   --   --  226*   BUN 23* 10  --   --   --  10   CREATININE 1.00 0.61*  --   --   --  0.70   ANIONGAP 14 13  --   --   --  11   LABGLOM >60 >60  --   --   --  >60   CALCIUM 9.7 8.9  --   --   --  9.3   TROPHS  --  17  --   --   --  13    < > = values in this interval not displayed. Recent Labs     11/25/22  1325 11/25/22  1711 11/25/22  2319 11/26/22  0602 11/26/22  0715 11/26/22  1103 11/26/22  1612 11/26/22 2001   PROT  --   --   --  6.1*  --   --   --   --    LABALBU  --   --   --  3.3*  --   --   --   --    AST  --   --   --  10  --   --   --   --    ALT  --   --   --  11  --   --   --   --    ALKPHOS  --   --   --  91  --   --   --   --    BILITOT  --   --   --  0.5  --   --   --   --    URICACID 4.7  --   --   --   --   --   --   --    POCGLU  --  338* 319*  --  274* 297* 248* 239*     ABG:  Lab Results   Component Value Date/Time    FIO2 NOT REPORTED 12/24/2016 02:47 AM     Lab Results   Component Value Date/Time    SPECIAL L FOREARM UNK MLS 11/25/2022 03:10 PM     Lab Results   Component Value Date/Time    CULTURE CULTURE IN PROGRESS 11/25/2022 04:21 PM       Radiology:  XR FOOT RIGHT (MIN 3 VIEWS)    Result Date: 11/25/2022  1. Soft tissue ulceration/irregularity of the great toe. 2. Mild degenerative changes as detailed above. Pes planus. 3. No acute fracture or dislocation. 4. Mild plantar calcaneal spur. MRI FOOT RIGHT WO CONTRAST    Result Date: 11/26/2022  1. Soft tissue ulceration at the distal aspect of the 1st toe with underlying cellulitis. No well-defined drainable fluid collection identified. 2. Osteomyelitis of the tuft and shaft of the 1st distal phalanx. Physical Examination:        Physical Exam  Vitals and nursing note reviewed. Constitutional:       General: He is not in acute distress. HENT:      Head: Normocephalic and atraumatic.    Eyes:      Conjunctiva/sclera: Conjunctivae normal.      Pupils: Pupils are equal, round, and reactive to light. Cardiovascular:      Rate and Rhythm: Normal rate and regular rhythm. Heart sounds: No murmur heard. Pulmonary:      Effort: Pulmonary effort is normal. No accessory muscle usage or respiratory distress. Breath sounds: No stridor. No decreased breath sounds, wheezing, rhonchi or rales. Abdominal:      General: Bowel sounds are normal. There is no distension. Palpations: Abdomen is soft. Abdomen is not rigid. Tenderness: There is no abdominal tenderness. There is no guarding. Musculoskeletal:         General: No tenderness. Skin:     General: Skin is warm and dry. Findings: No erythema, lesion or rash. Neurological:      Mental Status: He is alert and oriented to person, place, and time. Cranial Nerves: No cranial nerve deficit. Motor: No seizure activity. Psychiatric:         Speech: Speech normal.         Behavior: Behavior normal. Behavior is cooperative. Assessment:        Hospital Problems             Last Modified POA    * (Principal) Sepsis ([facility] 11/25/2022 Yes    Cellulitis of right foot 11/25/2022 Yes    Ventral hernia without obstruction or gangrene 11/25/2022 Yes    Hyperglycemia 11/25/2022 Yes    Homeless single person 11/26/2022 Yes    Type 2 diabetes mellitus with hyperglycemia, with long-term current use of insulin ([address] 11/25/2022 Yes    Uncontrolled type 2 diabetes mellitus with hyperglycemia, without long-term current use of insulin ([address] 11/26/2022 Yes    Schizoaffective disorder ([address] 11/26/2022 Yes    Tobacco abuse 11/25/2022 Yes       Plan:            Sepsis    Cellulitis of right foot/osteomyelitis of the tuft and shaft of first distal phalanx on MRI:   Sepsis resolved. Continue with IV antibiotics per ID [Vanco and cefepime].   Podiatry is following, dressing change and tentative plan for surgical intervention       Ventral hernia without obstruction or gangrene: Chronic, evaluated by surgery, asymptomatic currently      Type 2 diabetes mellitus with hyperglycemia, with long-term current use of insulin : Blood sugar still elevated, will adjust basal insulin and increase sliding scale. Hypoglycemia protocol. A1c is significantly elevated at 18.3       Schizoaffective disorder: Restarted on 5 mg Zyprexa    Hyperlipidemia: Restarted statin     Tobacco abuse: Encourage cessation/offer nicotine patch     Homeless single person: Appreciate social work assistance    Continue PT/OT.     DVT prophylaxis    Discussed with the patient and the nurse       Joce Kerr MD  11/27/2022  7:33 AM

## 2022-11-27 NOTE — DISCHARGE INSTR - COC
Continuity of Care Form    Patient Name: Agapito Marques   :  1965  MRN:  [id number]    Admit date:  2022  Discharge date:  22    Code Status Order: Full Code   Advance Directives:     Admitting Physician:  Melinda Brady MD  PCP: No primary care provider on file. Discharging Nurse: [facility] Unit/Room#: 1002/1002-02  Discharging Unit Phone Number: [phone]    Emergency Contact:   Extended Emergency Contact Information  Primary Emergency Contact: [address] Phone: [phone]  Relation: Brother/Sister    Past Surgical History:  Past Surgical History:   Procedure Laterality Date    ARM SURGERY         Immunization History: There is no immunization history for the selected administration types on file for this patient.     Active Problems:  Patient Active Problem List   Diagnosis Code    Ventral hernia without obstruction or gangrene K43.9    Hyperglycemia R73.9    Ketonemia R79.89    Lactic acidosis E87.20    Acute encephalopathy G93.40    Homeless single person Z59.00    Type 2 diabetes mellitus with hyperglycemia, with long-term current use of insulin ([address] E11.65, Z79.4    History of noncompliance with medical treatment Z91.199    Wound of left ankle S91.002A    Wound of abdomen S31.109A    Uncontrolled type 2 diabetes mellitus with hyperglycemia, without long-term current use of insulin (HCC) E11.65    Schizoaffective disorder ([facility]) F25.9    Tobacco abuse Z72.0    Sepsis ([address] A41.9    Cellulitis of right foot L03.115    Hyponatremia E87.1       Isolation/Infection:   Isolation            No Isolation          Patient Infection Status       None to display            Nurse Assessment:  Last Vital Signs: /84   Pulse 87   Temp 97.5 °F (36.4 °C) (Axillary)   Resp 16   Ht 6' 3\" (1.905 m)   Wt 220 lb (99.8 kg)   SpO2 96%   BMI 27.50 kg/m²     Last documented pain score (0-10 scale):    Last Weight: Wt Readings from Last 1 Encounters:   11/25/22 220 lb (99.8 kg)     Mental Status:  oriented and alert    IV Access:  - None    Nursing Mobility/ADLs:  Walking   Assisted  Transfer  Assisted  Bathing  Independent  Dressing  Independent  [address]  Assisted  Med Delivery   whole    Wound Care Documentation and Therapy:  Wound 12/24/16  Abdomen Mid;Anterior center of large ventral hernia (Active)   Number of days: 2164       Wound 12/24/16 Ankle Left;Medial (Active)   Number of days: 2164       Wound 12/29/16 Other (Comment) Foot Left green purulant drainage with foul odor (Active)   Number of days: 2158       Wound 11/25/22 Foot (Active)   Wound Image     11/25/22 1627   Wound Etiology Other 11/27/22 0900   Dressing Status Clean;Dry; Intact 11/27/22 0900   Wound Cleansed Betadine/povidone iodine 11/25/22 1627   Dressing/Treatment Roll gauze 11/25/22 1627   Wound Assessment Other (Comment) 11/27/22 0900   Odor None 11/25/22 1627   Comfort-wound Assessment Dry/flaky 11/27/22 0900   Number of days: 2        Elimination:  Continence: Bowel: Yes  Bladder: Yes  Urinary Catheter: None   Colostomy/Ileostomy/Ileal Conduit: No       Date of Last BM: ***    Intake/Output Summary (Last 24 hours) at 11/27/2022 1351  Last data filed at 11/27/2022 1300  Gross per 24 hour   Intake 266.2 ml   Output 1200 ml   Net -933.8 ml     I/O last 3 completed shifts: In: 266.2 [IV Piggyback:266.2]  Out: 1900 [Urine:1900]    Safety Concerns:      At Risk for Falls    Impairments/Disabilities:      None    Nutrition Therapy:  Current Nutrition Therapy:   - Oral Diet:  Carb Control 4 carbs/meal (1800kcals/day) and Low Sodium (2gm)    Routes of Feeding: Oral  Liquids: No Restrictions  Daily Fluid Restriction: no  Last Modified Barium Swallow with Video (Video Swallowing Test): not done    Treatments at the Time of Hospital Discharge:   Respiratory Treatments: None  Oxygen Therapy:  is not on home oxygen therapy. Ventilator:    - No ventilator support    Rehab Therapies: Physical Therapy and Occupational Therapy  Weight Bearing Status/Restrictions: No weight bearing restrictions - WBAT right foot  Other Medical Equipment (for information only, NOT a DME order):  walker  Other Treatments:   Daily wound care to right foot: Please apply betadine, DSD and ace bandage    Patient's personal belongings (please select all that are sent with patient):  None    RN SIGNATURE:  Electronically signed by Marry Lopes RN on 11/30/22 at 12:09 PM EST    CASE MANAGEMENT/SOCIAL WORK SECTION    Inpatient Status Date: ***    Readmission Risk Assessment Score:  Readmission Risk              Risk of Unplanned Readmission:  14           Discharging to Facility/ Agency   Name: Name: Manju Agrawal  Address: [address] [place] [address]  Phone: [phone]  Fax:  [phone]   Address:  Phone:  Fax:    Dialysis Facility (if applicable)   Name:  Address:  Dialysis Schedule:  Phone:  Fax:    / signature: Electronically signed by ELLEN Collado on 11/30/22 at 12:31 PM EST    PHYSICIAN SECTION    Prognosis: Guarded    Condition at Discharge: Stable    Rehab Potential (if transferring to Rehab): Guarded    Recommended Labs or Other Treatments After Discharge: PT, OT, nursing evaluation and treatment, follow up PCP, follow up Wound care, follow up Diabetes educations, follow up orthopedic surgery. Plan for 14 days total of antibiotics for foot wound. Please change dressing daily with betadine, clean gauze, kerlix roll and tape. Physician Certification: I certify the above information and transfer of Minerva Lobo  is necessary for the continuing treatment of the diagnosis listed and that he requires [facility] for less 30 days.      Update Admission H&P: No change in H&P    PHYSICIAN SIGNATURE:  Electronically signed by Leonardo Rivas MD on 11/30/22 at 11:54 AM EST

## 2022-11-28 LAB
ANION GAP SERPL CALCULATED.3IONS-SCNC: 11 MMOL/L (ref 9–17)
BUN BLDV-MCNC: 9 MG/DL (ref 6–20)
BUN/CREAT BLD: 13 (ref 9–20)
C-REACTIVE PROTEIN: 71.7 MG/L (ref 0–5)
CALCIUM SERPL-MCNC: 9.3 MG/DL (ref 8.6–10.4)
CHLORIDE BLD-SCNC: 100 MMOL/L (ref 98–107)
CO2: 24 MMOL/L (ref 20–31)
CREAT SERPL-MCNC: 0.67 MG/DL (ref 0.7–1.2)
GFR SERPL CREATININE-BSD FRML MDRD: >60 ML/MIN/1.73M2
GLUCOSE BLD-MCNC: 200 MG/DL (ref 75–110)
GLUCOSE BLD-MCNC: 201 MG/DL (ref 70–99)
GLUCOSE BLD-MCNC: 253 MG/DL (ref 75–110)
GLUCOSE BLD-MCNC: 254 MG/DL (ref 75–110)
GLUCOSE BLD-MCNC: 297 MG/DL (ref 75–110)
HCT VFR BLD CALC: 40.9 % (ref 40.7–50.3)
HEMOGLOBIN: 13.6 G/DL (ref 13–17)
MCH RBC QN AUTO: 29.8 PG (ref 25.2–33.5)
MCHC RBC AUTO-ENTMCNC: 33.3 G/DL (ref 28.4–34.8)
MCV RBC AUTO: 89.5 FL (ref 82.6–102.9)
NRBC AUTOMATED: 0 PER 100 WBC
PDW BLD-RTO: 11.7 % (ref 11.8–14.4)
PLATELET # BLD: 278 K/UL (ref 138–453)
PMV BLD AUTO: 10.1 FL (ref 8.1–13.5)
POTASSIUM SERPL-SCNC: 3.8 MMOL/L (ref 3.7–5.3)
PROCALCITONIN: 0.17 NG/ML
RBC # BLD: 4.57 M/UL (ref 4.21–5.77)
SEDIMENTATION RATE, ERYTHROCYTE: 70 MM/HR (ref 0–20)
SODIUM BLD-SCNC: 135 MMOL/L (ref 135–144)
VANCOMYCIN RANDOM: 14.6 UG/ML
WBC # BLD: 7.7 K/UL (ref 3.5–11.3)

## 2022-11-28 PROCEDURE — 97535 SELF CARE MNGMENT TRAINING: CPT

## 2022-11-28 PROCEDURE — 6370000000 HC RX 637 (ALT 250 FOR IP): Performed by: STUDENT IN AN ORGANIZED HEALTH CARE EDUCATION/TRAINING PROGRAM

## 2022-11-28 PROCEDURE — 85027 COMPLETE CBC AUTOMATED: CPT

## 2022-11-28 PROCEDURE — 2580000003 HC RX 258: Performed by: PHYSICIAN ASSISTANT

## 2022-11-28 PROCEDURE — 6370000000 HC RX 637 (ALT 250 FOR IP): Performed by: FAMILY MEDICINE

## 2022-11-28 PROCEDURE — 80048 BASIC METABOLIC PNL TOTAL CA: CPT

## 2022-11-28 PROCEDURE — 86140 C-REACTIVE PROTEIN: CPT

## 2022-11-28 PROCEDURE — 82947 ASSAY GLUCOSE BLOOD QUANT: CPT

## 2022-11-28 PROCEDURE — 2580000003 HC RX 258: Performed by: STUDENT IN AN ORGANIZED HEALTH CARE EDUCATION/TRAINING PROGRAM

## 2022-11-28 PROCEDURE — 97116 GAIT TRAINING THERAPY: CPT

## 2022-11-28 PROCEDURE — 2060000000 HC ICU INTERMEDIATE R&B

## 2022-11-28 PROCEDURE — 80202 ASSAY OF VANCOMYCIN: CPT

## 2022-11-28 PROCEDURE — 6370000000 HC RX 637 (ALT 250 FOR IP): Performed by: INTERNAL MEDICINE

## 2022-11-28 PROCEDURE — 6360000002 HC RX W HCPCS: Performed by: INTERNAL MEDICINE

## 2022-11-28 PROCEDURE — 6360000002 HC RX W HCPCS: Performed by: STUDENT IN AN ORGANIZED HEALTH CARE EDUCATION/TRAINING PROGRAM

## 2022-11-28 PROCEDURE — 84145 PROCALCITONIN (PCT): CPT

## 2022-11-28 PROCEDURE — 85652 RBC SED RATE AUTOMATED: CPT

## 2022-11-28 PROCEDURE — 2580000003 HC RX 258: Performed by: INTERNAL MEDICINE

## 2022-11-28 PROCEDURE — 97530 THERAPEUTIC ACTIVITIES: CPT

## 2022-11-28 PROCEDURE — 36415 COLL VENOUS BLD VENIPUNCTURE: CPT

## 2022-11-28 RX ORDER — LEVOFLOXACIN 500 MG/1
500 TABLET, FILM COATED ORAL DAILY
Qty: 10 TABLET | Refills: 1 | Status: SHIPPED | OUTPATIENT
Start: 2022-11-28 | End: 2022-12-08

## 2022-11-28 RX ORDER — INSULIN LISPRO 100 [IU]/ML
3 INJECTION, SOLUTION INTRAVENOUS; SUBCUTANEOUS
Status: DISCONTINUED | OUTPATIENT
Start: 2022-11-28 | End: 2022-11-30 | Stop reason: HOSPADM

## 2022-11-28 RX ORDER — INSULIN GLARGINE 100 [IU]/ML
30 INJECTION, SOLUTION SUBCUTANEOUS 2 TIMES DAILY
Status: DISCONTINUED | OUTPATIENT
Start: 2022-11-28 | End: 2022-11-30 | Stop reason: HOSPADM

## 2022-11-28 RX ADMIN — ROSUVASTATIN CALCIUM 40 MG: 40 TABLET, FILM COATED ORAL at 20:05

## 2022-11-28 RX ADMIN — FAMOTIDINE 20 MG: 20 TABLET, FILM COATED ORAL at 09:24

## 2022-11-28 RX ADMIN — VANCOMYCIN HYDROCHLORIDE 1250 MG: 5 INJECTION, POWDER, LYOPHILIZED, FOR SOLUTION INTRAVENOUS at 02:44

## 2022-11-28 RX ADMIN — ASPIRIN 81 MG: 81 TABLET, COATED ORAL at 09:24

## 2022-11-28 RX ADMIN — INSULIN LISPRO 8 UNITS: 100 INJECTION, SOLUTION INTRAVENOUS; SUBCUTANEOUS at 12:29

## 2022-11-28 RX ADMIN — CEFEPIME 2000 MG: 2 INJECTION, POWDER, FOR SOLUTION INTRAVENOUS at 04:23

## 2022-11-28 RX ADMIN — INSULIN GLARGINE 30 UNITS: 100 INJECTION, SOLUTION SUBCUTANEOUS at 20:05

## 2022-11-28 RX ADMIN — Medication 10 ML: at 20:06

## 2022-11-28 RX ADMIN — OXYCODONE AND ACETAMINOPHEN 2 TABLET: 325; 5 TABLET ORAL at 09:36

## 2022-11-28 RX ADMIN — CEFEPIME 2000 MG: 2 INJECTION, POWDER, FOR SOLUTION INTRAVENOUS at 20:14

## 2022-11-28 RX ADMIN — SODIUM CHLORIDE, PRESERVATIVE FREE 10 ML: 5 INJECTION INTRAVENOUS at 09:25

## 2022-11-28 RX ADMIN — INSULIN LISPRO 4 UNITS: 100 INJECTION, SOLUTION INTRAVENOUS; SUBCUTANEOUS at 09:25

## 2022-11-28 RX ADMIN — ENOXAPARIN SODIUM 40 MG: 100 INJECTION SUBCUTANEOUS at 09:24

## 2022-11-28 RX ADMIN — FAMOTIDINE 20 MG: 20 TABLET, FILM COATED ORAL at 20:05

## 2022-11-28 RX ADMIN — INSULIN LISPRO 8 UNITS: 100 INJECTION, SOLUTION INTRAVENOUS; SUBCUTANEOUS at 17:32

## 2022-11-28 RX ADMIN — INSULIN GLARGINE 25 UNITS: 100 INJECTION, SOLUTION SUBCUTANEOUS at 09:26

## 2022-11-28 RX ADMIN — Medication 10 ML: at 09:24

## 2022-11-28 RX ADMIN — OLANZAPINE 5 MG: 5 TABLET, FILM COATED ORAL at 20:05

## 2022-11-28 RX ADMIN — CEFEPIME 2000 MG: 2 INJECTION, POWDER, FOR SOLUTION INTRAVENOUS at 12:34

## 2022-11-28 RX ADMIN — VANCOMYCIN HYDROCHLORIDE 1500 MG: 5 INJECTION, POWDER, LYOPHILIZED, FOR SOLUTION INTRAVENOUS at 16:37

## 2022-11-28 ASSESSMENT — ENCOUNTER SYMPTOMS
CONSTIPATION: 0
COUGH: 0
RESPIRATORY NEGATIVE: 1
DIARRHEA: 0
ABDOMINAL PAIN: 0
BLOOD IN STOOL: 0
NAUSEA: 0
VOMITING: 0
CHEST TIGHTNESS: 0
SHORTNESS OF BREATH: 0
GASTROINTESTINAL NEGATIVE: 1
WHEEZING: 0

## 2022-11-28 ASSESSMENT — PAIN SCALES - GENERAL
PAINLEVEL_OUTOF10: 5
PAINLEVEL_OUTOF10: 0
PAINLEVEL_OUTOF10: 9

## 2022-11-28 ASSESSMENT — PAIN DESCRIPTION - DESCRIPTORS: DESCRIPTORS: PINS AND NEEDLES

## 2022-11-28 ASSESSMENT — PAIN DESCRIPTION - ORIENTATION: ORIENTATION: RIGHT

## 2022-11-28 ASSESSMENT — PAIN DESCRIPTION - LOCATION: LOCATION: FOOT

## 2022-11-28 NOTE — CARE COORDINATION
Discharge planning    Patient chart reviewed. Appreciate prior CM assessment. Patient is staying with brother. Has no DME currently. He has no insurance listed but will have [employer] look into. Patient admitted with diabetic ulcer and podiatry and ID following. ID has on iv atb currently. Podiatry states to follow up with wound care clinic asap after discharge and do daily dressing changes. Needs glucometer will call pharmacy to see if any free ones available . Will need to watch medications also. Need ID to confirm atb  F/u HELP with insurance and RX assist for meds? No pcp if wants home care podiatry will have to follow but only if payor source identified. Needs pcp and wound care clinic appts. 1130  [facility] does not provide free glucometers. Will be cheaper for patient to purchase off [facility] or [facility] with ID and anticipates oral atb   Spoke with Aleida Valenzuela and HELP is initiating medicaid    Sister Charli in room and stated patient has [facility] ( >? [place]/) message with Sabine Carver also stated she is patient legal guardian. She will bring in legal documentation to scan. She wants to be called for any appointments and such. Discussed RX assist and sister will pay for any medications. He will need all of the meds filled. Changed pharmacy location to [facility] for rx assist.     Discussed PCP with sister and patient has been to [facility]. Call to [facility] but closed for lunch until 1330. Will attempt later. Appointment made for [facility] wound care clinic on Wed 11/30 at 0830 . Added to discharge instructions.       1430  Camille from [facility] messaged back IF patient would need iv atb vanco 1500 every 12 would be 40.09/day

## 2022-11-28 NOTE — PROGRESS NOTES
[facility]  Office: [address], DO, Quincy Mendieta, DO, Sydney July, DO, Jennifer Trevino Blood, DO, Rodrigo Rico MD, Delaney Harvey MD, Omar Reddy MD, Sylvie Bernheim, MD,  Bailee Chambers MD, Mae Casiano MD, Lady Doll, DO, Katherin Araiza MD,  Ebony Lindquist MD, Cara Arriaga MD, Kenyon Mcclelland DO, Ina Delgado MD, Ed Hernández MD, Su Cruz DO, Rain Wayne MD, Katarina Wyatt MD, Cierra Hale MD, Dwayne Gomez MD, Ebonie Mas DO, Kathleen Frey MD, Ned Vza MD, Fannie Clarke, Patricia Barrios, CNP, Russell Cruz, CNP, Jacob Payton, CNP,  Freya Issa, [facility], Jeremy Anna, CNP, Jaclyn Monroy, CNP, Too Obregon, CNP, Audrey Sorto CNP, Kathia Herandez [facility], Olga Stovall PA-C, Ezio Perdomo, BENOIT, Myrna Hurtado, JONNA, Rogelio Harrison, CNP, Uli Peña CNP, Mary Cardoso, [facility]    Progress Note    11/28/2022    7:42 AM    Name:   Taz Boland  MRN:     [id number]     Acct:      [de-identified]   Room:   [address] [place] [address] Day:  3  Admit Date:  11/25/2022  1:14 PM    PCP:   No primary care provider on file. Code Status:  Full Code    Subjective:     C/C:   Chief Complaint   Patient presents with    Toe Pain     \"Think I hit it on something a week or 2 ago\" hx diabetes and not taking prescribed metformin      Interval History Status: not changed. Patient seen at bedside, no acute events overnight. .    Afebrile overnight   Blood sugars needs better control   Podiatry is not planning intervention as inpatient  Patient denies any chest pain, shortness of breath, chills, fevers, nausea or vomiting.   Patient vitals, labs and all providers notes were reviewed,from overnight shift and morning updates were noted and discussed with the nurse    Brief History:     Per my partner  61-year-old male past medical history of schizoaffective disorder, diabetes type 2, ventral hernia, peripheral neuropathy, noncompliant presents to the ER with right foot pain after hitting something on his foot a few weeks prior. Patient seen and evaluated by podiatry, surgery, infectious disease. Currently on vancomycin and cefepime for osteomyelitis. MRI planned for 11/26/2022. Review of Systems:     Review of Systems   Constitutional:  Positive for activity change, appetite change and fatigue. Negative for chills, diaphoresis and fever. HENT:  Negative for congestion. Eyes:  Negative for visual disturbance. Respiratory:  Negative for cough, chest tightness, shortness of breath and wheezing. Cardiovascular:  Negative for chest pain, palpitations and leg swelling. Gastrointestinal:  Negative for abdominal pain, blood in stool, constipation, diarrhea, nausea and vomiting. Genitourinary:  Negative for difficulty urinating. Musculoskeletal:  Positive for arthralgias and gait problem. Neurological:  Positive for weakness. Negative for dizziness, light-headedness, numbness and headaches. All other systems reviewed and are negative. Medications:      Allergies:  No Known Allergies    Current Meds:   Scheduled Meds:    vancomycin  1,500 mg IntraVENous Q12H    insulin glargine  25 Units SubCUTAneous BID    nicotine  1 patch TransDERmal Daily    OLANZapine  5 mg Oral Nightly    rosuvastatin  40 mg Oral Nightly    sodium chloride flush  5-40 mL IntraVENous 2 times per day    aspirin EC  81 mg Oral Daily    sodium chloride flush  5-40 mL IntraVENous 2 times per day    enoxaparin  40 mg SubCUTAneous Daily    famotidine  20 mg Oral BID    cefepime  2,000 mg IntraVENous Q8H    insulin lispro  0-16 Units SubCUTAneous TID WC    insulin lispro  0-4 Units SubCUTAneous Nightly    vancomycin (VANCOCIN) intermittent dosing (placeholder)   Other RX Placeholder     Continuous Infusions:    dextrose      sodium chloride      sodium chloride 10 mL/hr at 11/27/22 1950     PRN Meds: glucose, dextrose bolus **OR** dextrose bolus, glucagon (rDNA), dextrose, sodium chloride flush, sodium chloride, sodium chloride flush, sodium chloride, acetaminophen **OR** acetaminophen, morphine **OR** morphine, oxyCODONE-acetaminophen **OR** oxyCODONE-acetaminophen    Data:     Past Medical History:   has a past medical history of Acute encephalopathy, Acute kidney injury ([facility] [address] Homelessness, Hyperglycemia, Ketonemia, Lactic acidosis, Noncompliance, Schizoaffective disorder ([facility] [address] Tobacco abuse, Type 2 diabetes mellitus with hyperosmolar nonketotic hyperglycemia ([facility] [address] Ventral hernia, Wound of abdomen, and Wound of left ankle. Social History:   reports that he has been smoking cigarettes. He has been smoking an average of 1 pack per day. He has never used smokeless tobacco. He reports that he does not drink alcohol and does not use drugs. Family History:   Family History   Problem Relation Age of Onset    Cancer Mother     Cancer Father        Vitals:  /82   Pulse 88   Temp 98.2 °F (36.8 °C) (Tympanic)   Resp 14   Ht 6' 3\" (1.905 m)   Wt 179 lb 9.6 oz (81.5 kg)   SpO2 98%   BMI 22.45 kg/m²   Temp (24hrs), Av.3 °F (36.8 °C), Min:97.5 °F (36.4 °C), Max:99 °F (37.2 °C)    Recent Labs     22  0812 22  1146 22  1630 22  1944   POCGLU 215* 245* 255* 293*         I/O (24Hr):     Intake/Output Summary (Last 24 hours) at 2022 0742  Last data filed at 2022 0140  Gross per 24 hour   Intake 245 ml   Output 1900 ml   Net -1655 ml         Labs:  Hematology:  Recent Labs     22  1325 22  0602 22  0507 22  0545   WBC 16.3*  --  9.7 7.7   RBC 4.67  --  4.50 4.57   HGB 14.0  --  13.6 13.6   HCT 41.4  --  40.3* 40.9   MCV 88.7  --  89.6 89.5   MCH 30.0  --  30.2 29.8   MCHC 33.8  --  33.7 33.3   RDW 11.9  --  11.8 11.7*     --  238 278   MPV 10.8  --  10.1 10.1   SEDRATE 71* 68* 65* 70*   .2* 178.9*  --  71.7*   INR  --  1.0  --   -- Chemistry:  Recent Labs     11/26/22  0602 11/26/22  1236 11/27/22  0009 11/27/22  0507 11/28/22  0545      < > 130* 132* 135   K 3.9  --   --  3.9 3.8   CL 99  --   --  96* 100   CO2 23  --   --  25 24   GLUCOSE 215*  --   --  226* 201*   BUN 10  --   --  10 9   CREATININE 0.61*  --   --  0.70 0.67*   ANIONGAP 13  --   --  11 11   LABGLOM >60  --   --  >60 >60   CALCIUM 8.9  --   --  9.3 9.3   TROPHS 17  --   --  13  --     < > = values in this interval not displayed. Recent Labs     11/25/22  1325 11/25/22  1711 11/26/22  0602 11/26/22  0715 11/26/22  1612 11/26/22 2001 11/27/22  0507 11/27/22  0812 11/27/22  1146 11/27/22  1630 11/27/22  1944   PROT  --   --  6.1*  --   --   --   --   --   --   --   --    LABALBU  --   --  3.3*  --   --   --   --   --   --   --   --    LABA1C 18.3*  --  18.9*  --   --   --   --   --   --   --   --    AST  --   --  10  --   --   --   --   --   --   --   --    ALT  --   --  11  --   --   --   --   --   --   --   --    ALKPHOS  --   --  91  --   --   --   --   --   --   --   --    BILITOT  --   --  0.5  --   --   --   --   --   --   --   --    CHOL  --   --   --   --   --   --  220*  --   --   --   --    HDL  --   --   --   --   --   --  48  --   --   --   --    LDLCHOLESTEROL  --   --   --   --   --   --  137*  --   --   --   --    CHOLHDLRATIO  --   --   --   --   --   --  4.6  --   --   --   --    TRIG  --   --   --   --   --   --  177*  --   --   --   --    URICACID 4.7  --   --   --   --   --   --   --   --   --   --    POCGLU  --    < >  --    < > 248* 239*  --  215* 245* 255* 293*    < > = values in this interval not displayed.        ABG:  Lab Results   Component Value Date/Time    FIO2 NOT REPORTED 12/24/2016 02:47 AM     Lab Results   Component Value Date/Time    SPECIAL L FOREARM UNK MLS 11/25/2022 03:10 PM     Lab Results   Component Value Date/Time    CULTURE STREPTOCOCCI, BETA HEMOLYTIC GROUP B HEAVY GROWTH (A) 11/25/2022 04:21 PM    CULTURE No anaerobic organisms isolated at 2 days. 11/25/2022 04:21 PM       Radiology:  XR FOOT RIGHT (MIN 3 VIEWS)    Result Date: 11/25/2022  1. Soft tissue ulceration/irregularity of the great toe. 2. Mild degenerative changes as detailed above. Pes planus. 3. No acute fracture or dislocation. 4. Mild plantar calcaneal spur. MRI FOOT RIGHT WO CONTRAST    Result Date: 11/26/2022  1. Soft tissue ulceration at the distal aspect of the 1st toe with underlying cellulitis. No well-defined drainable fluid collection identified. 2. Osteomyelitis of the tuft and shaft of the 1st distal phalanx. Physical Examination:        Physical Exam  Vitals and nursing note reviewed. Constitutional:       General: He is not in acute distress. HENT:      Head: Normocephalic and atraumatic. Eyes:      Conjunctiva/sclera: Conjunctivae normal.      Pupils: Pupils are equal, round, and reactive to light. Cardiovascular:      Rate and Rhythm: Normal rate and regular rhythm. Heart sounds: No murmur heard. Pulmonary:      Effort: Pulmonary effort is normal. No accessory muscle usage or respiratory distress. Breath sounds: No stridor. No decreased breath sounds, wheezing, rhonchi or rales. Abdominal:      General: Bowel sounds are normal. There is no distension. Palpations: Abdomen is soft. Abdomen is not rigid. Tenderness: There is no abdominal tenderness. There is no guarding. Musculoskeletal:         General: No tenderness. Skin:     General: Skin is warm and dry. Findings: No erythema, lesion or rash. Neurological:      Mental Status: He is alert and oriented to person, place, and time. Cranial Nerves: No cranial nerve deficit. Motor: No seizure activity. Psychiatric:         Speech: Speech normal.         Behavior: Behavior normal. Behavior is cooperative.                    Assessment:        Hospital Problems             Last Modified POA    * (Principal) Sepsis ([address] 11/25/2022 Yes Cellulitis of right foot 11/25/2022 Yes    Osteomyelitis of right foot ([address] 11/27/2022 Yes    Ventral hernia without obstruction or gangrene 11/25/2022 Yes    Hyperglycemia 11/25/2022 Yes    Homeless single person 11/26/2022 Yes    Type 2 diabetes mellitus with hyperglycemia, with long-term current use of insulin ([address] 11/25/2022 Yes    Uncontrolled type 2 diabetes mellitus with hyperglycemia, without long-term current use of insulin ([address] 11/26/2022 Yes    Schizoaffective disorder ([address] 11/26/2022 Yes    Tobacco abuse 11/25/2022 Yes     Plan:           Sepsis    Cellulitis of right foot/osteomyelitis of the tuft and shaft of first distal phalanx on MRI:   Sepsis resolved. Continue with IV antibiotics per ID [Vanco and cefepime]. Podiatry is following, dressing change and tentative plan for surgical intervention       Ventral hernia without obstruction or gangrene: Chronic, evaluated by surgery, asymptomatic currently     Type 2 diabetes mellitus with hyperglycemia, with long-term current use of insulin : Blood sugar still elevated, will adjust basal insulin again and add meal coverage Hypoglycemia protocol. A1c is significantly elevated at 18.3       Schizoaffective disorder: Restarted on 5 mg Zyprexa    Hyperlipidemia: Restarted statin     Tobacco abuse: Encourage cessation/offer nicotine patch     Homeless single person: Appreciate social work assistance    Continue PT/OT. DVT prophylaxis    Discussed with the patient and the nurse     No plan for intervention per podiatry, patient can be discharged on IV antibiotics , podiatry requesting daily changes, patient does not have insurance or PCP.    helping assisting with discharge planning      Jamison Wallace MD  11/28/2022  7:42 AM

## 2022-11-28 NOTE — PROGRESS NOTES
[address] [facility] Pharmacokinetic Monitoring Service - Vancomycin    Consulting Provider: Dr. Cherelle Coburn    Indication: SSTI  Target Concentration: Goal trough of 10-15 mg/L and AUC/LIMA <500 mg*hr/L  Day of Therapy: 4  Additional Antimicrobials: cefepime    Pertinent Laboratory Values: Wt Readings from Last 1 Encounters:   11/28/22 179 lb 9.6 oz (81.5 kg)     Temp Readings from Last 1 Encounters:   11/28/22 98.2 °F (36.8 °C) (Tympanic)     Estimated Creatinine Clearance: 144 mL/min (A) (based on SCr of 0.67 mg/dL (L)). Recent Labs     11/27/22  0507 11/28/22  0545   CREATININE 0.70 0.67*   WBC 9.7 7.7     Procalcitonin: pending    Pertinent Cultures:  Culture Date Source Results   11/25/2022 Foot Rare neutrophils   Few gram + cocci  Streptococci, beta hemolytic group B heavy growth    MRSA Nasal Swab: N/A. Non-respiratory infection.     Recent vancomycin administrations                     vancomycin (VANCOCIN) 1,250 mg in dextrose 5 % 250 mL IVPB (mg) 1,250 mg New Bag 11/28/22 0244     1,250 mg New Bag 11/27/22 1621     1,250 mg New Bag  0324     1,250 mg New Bag 11/26/22 1541    vancomycin 1000 mg IVPB in 250 mL D5W addavial (mg) 1,000 mg New Bag 11/26/22 0332    vancomycin (VANCOCIN) 2,500 mg in dextrose 5 % 500 mL IVPB (mg) 2,500 mg New Bag 11/25/22 1513                    Assessment:  Date/Time Current Dose Concentration Timing of Concentration (h) AUC   11/28/22 1250 mg Q12H 14.6 3 hours 1 min 357   Note: Serum concentrations collected for AUC dosing may appear elevated if collected in close proximity to the dose administered, this is not necessarily an indication of toxicity    Plan:  Current dosing regimen is sub-therapeutic  Increase dose to 1500 mg Q12H  Repeat vancomycin concentration ordered for 11/30 @ 0600   Pharmacy will continue to monitor patient and adjust therapy as indicated    Thank you for the consult,  Chad Shelton  11/28/2022 7:16 AM

## 2022-11-28 NOTE — PROGRESS NOTES
Physical Therapy  Facility/Department: [facility] CARE  Daily Treatment Note  NAME: Bonita Alaniz  : 1965  MRN: [id number]    Date of Service: 2022    Discharge Recommendations:  Patient would benefit from continued therapy after discharge        Patient Diagnosis(es): The primary encounter diagnosis was Hyperglycemia. A diagnosis of Cellulitis of right foot was also pertinent to this visit. Assessment   Assessment: Pt maintains WB well  Activity Tolerance: Patient tolerated treatment well     Plan    Physcial Therapy Plan  General Plan: 5-7 times per week  Current Treatment Recommendations: Transfer training;Gait training;Stair training; Endurance training     Restrictions  Restrictions/Precautions  Restrictions/Precautions: Up as Tolerated, General Precautions, Weight Bearing  Required Braces or Orthoses?: No  Lower Extremity Weight Bearing Restrictions  Right Lower Extremity Weight Bearing: Weight Bearing As Tolerated  Position Activity Restriction  Other position/activity restrictions: Up as tolerated and with assist, WBAT to RLE, telemetry, LUE IV, RUE IV     Subjective    Subjective  Pain: Pain 9/10 R foot  Orientation  Overall Orientation Status: Within Normal Limits  Orientation Level: Oriented X4  Cognition  Overall Cognitive Status: WNL  Arousal/Alertness: Appropriate responses to stimuli  Following Commands: Follows multistep commands consistently; Follows multistep commands with repitition; Follows multistep commands with increased time  Attention Span: Attends with cues to redirect  Memory: Decreased recall of precautions  Safety Judgement: Decreased awareness of need for safety;Decreased awareness of need for assistance  Problem Solving: Decreased awareness of errors;Assistance required to identify errors made;Assistance required to generate solutions  Insights: Decreased awareness of deficits  Initiation: Requires cues for some  Sequencing: Requires cues for some     Objective Vitals     Transfer Training  Sit to Stand: Stand-by assistance;Contact-guard assistance  Stand to Sit: Stand-by assistance;Contact-guard assistance  Gait Training  Gait Training: Yes  Right Side Weight Bearing: Heel  Gait  Overall Level of Assistance: Contact-guard assistance  Interventions: Verbal cues  Gait Abnormalities: Step to gait  Distance (ft): 80 Feet  Assistive Device: Walker, rolling;Gait belt           Safety Devices  Type of Devices: Gait belt;Left in chair;Chair alarm in place;Call light within reach  Restraints  Restraints Initially in Place: No       Goals  Short Term Goals  Time Frame for Short Term Goals: 12 visits  Short Term Goal 1: Patient will be indep with transfers. Short Term Goal 2: Patient will amb 100 feet with RW and indep. Short Term Goal 3: Patient will have good- standng balance. Short Term Goal 4: Patient will tolerate 30 minutes of ther-ex and ther-act. Short Term Goal 5: Patient will negotiate 4 stairs with rail and CGA.   Patient Goals   Patient Goals : none stated    Education  Patient Education  Education Given To: Patient  Education Provided: Plan of Care  Education Provided Comments: Handout: RW sit<<-->stand and ambulation heel touch R LE  Education Method: Verbal;Printed Information/Hand-outs  Barriers to Learning: None  Education Outcome: Verbalized understanding;Demonstrated understanding    Therapy Time   Individual Concurrent Group Co-treatment   Time In [facility]         Time Out 1140         Minutes [address] [place], [place]

## 2022-11-28 NOTE — PROGRESS NOTES
Progress Note  Podiatric Medicine and Surgery     Subjective     CC: Right hallux wound with pain     DC planning. In NAD. Discuss DC plan with case managment    HPI :  Cata Cruz is a 49 y.o. male seen at [facility] ED by podiatry for a right hallux with associated pain. Patient states that he has been dealing with the right hallux wound for around 2 weeks and just recently developed pain and redness. He says that he has noticed increase drainage from the area and became increasing concerned for infection. He denies any systemic signs of infection including nausea, vomiting, fever or chills. He is a diabetic patient with blood sugars currently 691. Says that he does not take his medication. PCP is Zaire Sahni MD    ROS: Denies N/V/F/C/SOB/CP. Otherwise negative except at stated in the HPI.      Medications:  Scheduled Meds:   vancomycin  1,500 mg IntraVENous Q12H    insulin glargine  25 Units SubCUTAneous BID    nicotine  1 patch TransDERmal Daily    OLANZapine  5 mg Oral Nightly    rosuvastatin  40 mg Oral Nightly    sodium chloride flush  5-40 mL IntraVENous 2 times per day    aspirin EC  81 mg Oral Daily    sodium chloride flush  5-40 mL IntraVENous 2 times per day    enoxaparin  40 mg SubCUTAneous Daily    famotidine  20 mg Oral BID    cefepime  2,000 mg IntraVENous Q8H    insulin lispro  0-16 Units SubCUTAneous TID WC    insulin lispro  0-4 Units SubCUTAneous Nightly    vancomycin (VANCOCIN) intermittent dosing (placeholder)   Other RX Placeholder       Continuous Infusions:   dextrose      sodium chloride      sodium chloride 10 mL/hr at 11/27/22 0523       PRN Meds:glucose, dextrose bolus **OR** dextrose bolus, glucagon (rDNA), dextrose, sodium chloride flush, sodium chloride, sodium chloride flush, sodium chloride, acetaminophen **OR** acetaminophen, morphine **OR** morphine, oxyCODONE-acetaminophen **OR** oxyCODONE-acetaminophen    Objective     Vitals:  Patient Vitals for the past 8 hrs:   BP Temp Temp src Pulse Resp SpO2 Weight   22 0725 117/82 -- Oral 88 14 98 % --   22 0711 -- -- -- -- -- -- 179 lb 9.6 oz (81.5 kg)   22 0345 111/72 98.2 °F (36.8 °C) Tympanic 87 14 -- --       Average, Min, and Max for last 24 hours Vitals:  TEMPERATURE:  Temp  Av.4 °F (36.9 °C)  Min: 97.5 °F (36.4 °C)  Max: 99 °F (37.2 °C)    RESPIRATIONS RANGE: Resp  Avg: 15.7  Min: 14  Max: 18    PULSE RANGE: Pulse  Av.3  Min: 87  Max: 99    BLOOD PRESSURE RANGE:  Systolic (20AAJ), HOY:762 , Min:107 , KPX:973   ; Diastolic (01ZCH), VTE:71, Min:72, Max:92      PULSE OXIMETRY RANGE: SpO2  Av.2 %  Min: 96 %  Max: 98 %    I/O last 3 completed shifts: In: 245 [I.V.:245]  Out: 1900 [Urine:1900]    CBC:  Recent Labs     22  1325 22  0602 22  0507 22  0545   WBC 16.3*  --  9.7 7.7   HGB 14.0  --  13.6 13.6   HCT 41.4  --  40.3* 40.9     --  238 278   .2* 178.9*  --  71.7*          BMP:  Recent Labs     22  0602 22  1236 22  0009 22  0507 22  0545      < > 130* 132* 135   K 3.9  --   --  3.9 3.8   CL 99  --   --  96* 100   CO2 23  --   --  25 24   BUN 10  --   --  10 9   CREATININE 0.61*  --   --  0.70 0.67*   GLUCOSE 215*  --   --  226* 201*   CALCIUM 8.9  --   --  9.3 9.3    < > = values in this interval not displayed. Coags:  Recent Labs     22  0602   PROT 6.1*   INR 1.0         Lab Results   Component Value Date    SEDRATE 70 (H) 2022     Recent Labs     22  1325 22  0602 22  0545   .2* 178.9* 71.7*         Lower Extremity Physical Exam:  Vascular: DP and PT pulses are lightly palpable. CFT <3 seconds to all digits. Hair growth is absent to the level of the digits. Edema noted to right foot. Neuro: Saph/sural/SP/DP/plantar sensation diminished to light touch. Musculoskeletal: Muscle strength is 4/5 to all lower extremity muscle groups. Gross deformity is absent. Dermatologic:  Erythema with increased warmth noted to dorsal aspect of patients right foot. Full thickness ulcer #1 located to the distal aspect of the right hallux. Base has a mix of granular, fibrotic and necrotic tissue. Periwound skin is hyperkeratotic. No purulent drainage expressed with no associated mal odor. Erythema present with associated increase in warmth. Probes to bone and tracks dorsal laterally. No fluctuance, crepitus, or induration. Interdigital maceration absent. Clinical Images:    See media panel        Imaging:   MRI FOOT RIGHT WO CONTRAST   Final Result   1. Soft tissue ulceration at the distal aspect of the 1st toe with underlying   cellulitis. No well-defined drainable fluid collection identified. 2. Osteomyelitis of the tuft and shaft of the 1st distal phalanx. VL LOWER EXTREMITY ARTERIAL SEGMENTAL PRESSURES W PPG   Final Result      XR FOOT RIGHT (MIN 3 VIEWS)   Final Result   1. Soft tissue ulceration/irregularity of the great toe. 2. Mild degenerative changes as detailed above. Pes planus. 3. No acute fracture or dislocation. 4. Mild plantar calcaneal spur. Cultures:   Taken at bedside in the ED. final results pending    Assessment   Elizabeth Menon is a 56 y.o. male with   Diabetic Ulcer down to the level of bone, right hallux  Osteomyelitis, right hallux  Cellulitis, right foot  Uncontrolled type II DM with peripheral neuropathy.     Principal Problem:    Sepsis ([address]  Active Problems:    Cellulitis of right foot    Osteomyelitis of right foot ([address]    Ventral hernia without obstruction or gangrene    Hyperglycemia    Homeless single person    Type 2 diabetes mellitus with hyperglycemia, with long-term current use of insulin ([address]    Uncontrolled type 2 diabetes mellitus with hyperglycemia, without long-term current use of insulin (HCC)    Schizoaffective disorder ([address]    Tobacco abuse  Resolved Problems:    * No resolved hospital problems. *       Plan     Patient examined and evaluated at bedside     Dressing applied to right foot: Betadine wet to dry, Kerlix and ace badnage  Patient is currently receiving Cefepime and Vanco. Order placed for ID for antibiotic management. Appreciate recommendations  Noninvasive vascular studies show the following:  Right ROMARIO: 0.75  Left ROMARIO: 0.99  MRI reviewed: Osteomyelitis to the distal phalanx of the right hallux  Surgery will be scheduled outpatient per attending. Patient will need to follow up wound care ASAP after discharge. Perform dressing change daily or at least every other day. Patient does not have insurance or PCP. Wound care has to put order for home health care.    WBAT to Right lower extremity  Discussed with Dr. Sunshine Vinson DPM   Podiatric Medicine & Surgery   11/28/2022 at 9:23 AM

## 2022-11-28 NOTE — PROGRESS NOTES
Physician Progress Note      PATIENTPilar Done  [facility] #:                  [id number]  :                       1965  ADMIT DATE:       2022 1:14 PM  DISCH DATE:  RESPONDING  PROVIDER #:        Nichole JEAN          QUERY TEXT:    Patient admitted with sepsis. Per Consult note dated 22 documentation of   sharp excisional debridement. To accurately reflect the procedure performed   please document the deepest depth of tissue removed as down to and including: The medical record reflects the following:  Risk Factors: Osteomyelitis, DM  Clinical Indicators: Per consult note on 22 \"Sharp excisional   debridement performed of the right hallux wound. 100% of wound debrided. No   anesthesia required. Hemostasis obtained with direct pressure. Patient reports   0/10 post procedure pain. \" note of diabetic ulcer down to bone on right   hallux  Treatment: Podiatry consult, Celestine Gracia excisional debridement    Thank you,  Pilar Leroy RN  Options provided:  -- Excisional debridement of skin  -- Excisional debridement of subcutaneous tissue  -- Excisional debridement of fascia  -- Excisional debridement of muscle  -- Excisional debridement of bone  -- Other - I will add my own diagnosis  -- Disagree - Not applicable / Not valid  -- Disagree - Clinically unable to determine / Unknown  -- Refer to Clinical Documentation Reviewer    PROVIDER RESPONSE TEXT:    Excisional debridement of right hallux wound down to the level of bone was   performed during procedure on 22. Query created by:  Joey Dior on 2022 9:00 AM      Electronically signed by:  Milka Skinner 2022 6:31 PM

## 2022-11-28 NOTE — PROGRESS NOTES
Occupational Therapy  Facility/Department: [facility]  Rehabilitation Occupational Therapy Daily Treatment Note    Date: 22  Patient Name: Alex Peter       Room: 9152/6180-44  MRN: [id number]  Account: [de-identified]   : 1965  (56 y.o.) Gender: male      MICHEAL Mcintyre reports patient is medically stable for therapy treatment this date. Chart reviewed prior to treatment and patient is agreeable for therapy. All lines intact and patient positioned comfortably at end of treatment. All patient needs addressed prior to ending therapy session. Due to recent hospitalization and medical condition, pt would benefit from additional intermittent skilled therapy at time of discharge. Please refer to the AM-PAC score for current functional status. Past Medical History:  has a past medical history of Acute encephalopathy, Acute kidney injury ([address] Homelessness, Hyperglycemia, Ketonemia, Lactic acidosis, Noncompliance, Schizoaffective disorder ([address] Tobacco abuse, Type 2 diabetes mellitus with hyperosmolar nonketotic hyperglycemia ([address] Ventral hernia, Wound of abdomen, and Wound of left ankle. Past Surgical History:   has a past surgical history that includes Arm Surgery. Restrictions  Restrictions/Precautions: Up as Tolerated;General Precautions;Weight Bearing  Other position/activity restrictions: Up as tolerated and with assist, WBAT to RLE, telemetry, LUE IV, RUE IV  Right Lower Extremity Weight Bearing: Weight Bearing As Tolerated  Required Braces or Orthoses?: No    Subjective  Subjective: Pt resting in bed agreeable to therapy. No c/o at this time. Restrictions/Precautions: Up as Tolerated;General Precautions;Weight Bearing             Objective     Cognition  Overall Cognitive Status: WFL  Arousal/Alertness: Appropriate responses to stimuli  Following Commands: Follows multistep commands consistently; Follows multistep commands with repitition; Follows multistep commands with increased time  Attention Span: Attends with cues to redirect  Memory: Decreased recall of precautions  Safety Judgement: Decreased awareness of need for safety;Decreased awareness of need for assistance  Problem Solving: Decreased awareness of errors;Assistance required to identify errors made;Assistance required to generate solutions  Insights: Decreased awareness of deficits  Initiation: Requires cues for some  Sequencing: Requires cues for some  Orientation  Overall Orientation Status: Within Functional Limits  Orientation Level: Oriented to person;Oriented to place; Disoriented to time;Oriented to situation         ADL  Grooming/Oral Hygiene  Assistance Level: Set-up; Contact guard assist;Stand by assist;Verbal cues  Skilled Clinical Factors: Washed face while seated on shower bench and SBA-CGA while standing at sink for oral hygiene, required VCs for proper positioning squaring self up to sink. Upper Extremity Bathing  Assistance Level: Stand by assist;Set-up  Lower Extremity Bathing  Assistance Level: Contact guard assist;Set-up; Stand by assist;Verbal cues  Skilled Clinical Factors: Washed front private area and B legs while seated and stood to wash buttocks. VC's for proper positioning and use of grab bar  Upper Extremity Dressing  Assistance Level: Minimal assistance;Set-up  Skilled Clinical Factors: Donning and tying [facility] gown  Lower Extremity Dressing  Assistance Level: Contact guard assist;Set-up  Skilled Clinical Factors: Donned hospital pants on own with CGA given for safety while standing to pull up around waist  Putting On/Taking Off Footwear  Assistance Level: Supervision;Set-up  Skilled Clinical Factors: Donning/doffing L sock while short sitting on shower bench  Tub/Shower Transfers  Type: Shower  Transfer From: Kristopher Show  Transfer To: Tub transfer bench  Additional Factors: Cues for hand placement;Verbal cues; With handrails  Assistance Level: Contact guard assist  Skilled Clinical Factors: Mod vc's for proper positioning, use of grab bar, controlled sit/stand, pacing, RW safety, and hand placement. Functional Mobility  Device: Rolling walker  Activity: To/From bathroom  Assistance Level: Contact guard assist  Skilled Clinical Factors: Pt hopped on LLE to/from bathroom w/o any LOBs with some slight unsteadiness but did not require any assist from writer. Mod vc's for slow/controlled movement, upright posture, pacing self, visual scanning, RW safety, and overall safety. Bed Mobility  Overall Assistance Level: Stand By Assist  Additional Factors: Head of bed raised; With handrails  Supine to Sit  Assistance Level: Stand by assist  Scooting  Assistance Level: Stand by assist  Transfers  Additional Factors: Hand placement cues; Verbal cues  Device: Walker  Sit to Stand  Assistance Level: Contact guard assist  Skilled Clinical Factors: Mod vc's for use of RW, RW safety, pushing up from surface when with hands when standing, squaring self/AD and reaching back prior to sitting, upright posture, controlled sit/stand, pacing, and overall safety. Stand to Sit  Assistance Level: Contact guard assist         Assessment  Assessment  Assessment: Pt tolerated treatment well with some impulsivness throughout needing VC's for safety d/t attempting to ambulate w/o RW and facing opposite way of grab bars in shower. However, pt was able to complete all ADLs during session on own and only required CGA from writer for safety throughout. During mobility pt treated RLE as NWB hopping throughout. Skilled OT services are indicated at this time to maximize this pt's safety and IND with self care tasks and to facilitate safe return to PLOF as able. Activity Tolerance: Patient tolerated treatment well  Discharge Recommendations: Patient would benefit from continued therapy after discharge  OT Equipment Recommendations  Equipment Needed: Yes  Mobility Devices: Alicia Guerrero; ADL Assistive Devices  Walker: Rolling  ADL Assistive Devices: Shower Chair with back  [facility] in place: Yes  Type of devices: Gait belt;Left in chair;Chair alarm in place;Nurse notified;Call light within reach    Patient Education  Education  Education Given To: Patient  Education Provided: Role of Therapy; Energy Conservation; Fall Prevention Strategies;Transfer Training;ADL Function;Mobility Training;DME/Home Modifications;Precautions; Safety  Education Method: Verbal  Education Outcome: Verbalized understanding;Continued education needed    Plan  Occupational Therapy Plan  Times Per Week: 4-5x/week 1-2x/day as tolerated  Current Treatment Recommendations: Balance training;Functional mobility training; Endurance training; Safety education & training;Pain management;Patient/Caregiver education & training;Self-Care / ADL; Home management training;Equipment evaluation, education, & procurement;Positioning;Cognitive/Perceptual training    Goals  Patient Goals   Patient goals : To decrease pain! Short Term Goals  Time Frame for Short Term Goals: By discharge, pt to demo:  Short Term Goal 1: bed mobility tasks with MOD I/IND and use of bedrails as needed. Short Term Goal 2: ADL transfers and functional mobility tasks with SUP and Good safety with use of AD as needed. Short Term Goal 3: LB ADLs to SUP and Good safety with use of AE/compensatory strategies/AD as needed. Short Term Goal 4: IND with a BUE HEP, with use of handouts as needed, in order to maintain current strength/ROM required for safety and IND with self care tasks. Short Term Goal 5: Pt/family to be IND with WB precautions, fall prevention strategies, EC/WS tech, compensatory ADL strategies, and discharge/equipment recommendations with use of handouts as needed.     AM-PAC Score        AM-[facility] Inpatient Daily Activity Raw Score: 19 (11/28/22 0858)  AM-PAC Inpatient ADL T-Scale Score : 40.22 (11/28/22 0858)  ADL Inpatient CMS 0-100% Score: 42.8 (11/28/22 0858)  ADL Inpatient CMS G-Code Modifier : CK (11/28/22 4052)      Therapy Time   Individual Concurrent Group Co-treatment   Time In 0831         Time Out 0927         Minutes [address], BRANDY

## 2022-11-28 NOTE — PROGRESS NOTES
[facility]  Progress Note    Mervin Clark  MRN: [id number]  Date: 11/28/2022  LOS: 3     Reason for F/U :   Beta Hemolytic Strep Right Foot Infection    Impression :   Right lower extremity peripheral arterial disease  Right hallux ulceration and cellulitis   wound cultures positive for beta hemolytic streptococcus  Distal right hallux osteomyelitis  Poorly controlled Type 2 Diabetes Mellitus  HbA1c of 18.3 on 11/26/22   Ventral Abdominal Wall Herniation  Schizoaffective Disorder    Recommendations:   Wound cultures from 11/25/22 shows Beta Hemolytic Group B streptococci  Healing is complicated by peripheral arterial disease with right ROMARIO of 0.75 and left ROMARIO of 0.99  The plan will be to transition from cefepime and vancomycin intravenously to PO Levaquin as it has good availability and coverage for Streptococci species  Will continue Levaquin for 2 weeks pending podiatry outpatient amputation of right hallux osteomyelitic bone  Podiatry recommends outpatient partial surgical amputation of the right hallux, date unknown, and continued follow up with wound care  Blood cultures obtained 11/25/22 show no growth at this time and I will continue to monitor  Continue to provide daily dressing changes of the right foot    Infection Control Recommendations:   Universal Precautions    Discharge Planning:   Estimated Length of IV antimicrobials: 2 weeks  Patient will need Midline Catheter Insertion/ PICC line Insertion: No  Patient will need: Home IV , [place],  SNF,  LTAC: Undetermined  Patient willneed outpatient wound care: No    Medical Decision making / Summary of Stay:   Mervin Clark is a 56y.o.-year-old male presented to hospital with right foot pain worsening over 2 weeks associated with open wound to the right hallux purulent drainage and redness that is extending to the foot dorsum. Symptoms severe, no alleviating or aggravating factors. He is afebrile.   The patient had history of uncontrolled diabetes, last HbA1c 18. 3. He was noted to have ventral hernia, surgery has been consulted. He denied abdominal pain, no nausea or vomiting, no diarrhea, no cough or shortness of breath, no other complaints. Initial WBC was 16.3, glucose 691, creatinine 1, C-reactive protein 196, sedimentation rate 71  Wound swab showed few gram-positive cocci in pairs on gram stain ,pending cultures. Initial lactic acid was 1.5     2022:  He is feeling better, right foot pain better controlled, denied fever or chills, denied nausea or vomiting, no other complaints. Current evaluation:2022    /82   Pulse 88   Temp 98.2 °F (36.8 °C) (Tympanic)   Resp 14   Ht 6' 3\" (1.905 m)   Wt 179 lb 9.6 oz (81.5 kg)   SpO2 98%   BMI 22.45 kg/m²     Temperature Range: Temp: 98.2 °F (36.8 °C) Temp  Av.4 °F (36.9 °C)  Min: 97.5 °F (36.4 °C)  Max: 99 °F (37.2 °C)    Patient examined at bedside, alert and awake  No apparent distress, Vital signs stable, afebrile  Complains of pain localized to right foot that is 10/10, constant, and throbbing    Review of Systems   Constitutional: Negative. Negative for chills, fatigue and fever. Respiratory: Negative. Negative for cough, shortness of breath and wheezing. Cardiovascular: Negative. Negative for chest pain. Gastrointestinal: Negative. Negative for abdominal pain, constipation, diarrhea and nausea. Genitourinary: Negative. Negative for dysuria. Musculoskeletal:  Positive for arthralgias. Negative for myalgias. Throbbing, constant pain of the right foot rated 10/10   Neurological: Negative. Negative for weakness and numbness. Denies paresthesia     Physical Examination :     Physical Exam  Constitutional:       General: He is not in acute distress. Appearance: Normal appearance. He is normal weight. He is not ill-appearing. HENT:      Head: Normocephalic and atraumatic.    Cardiovascular:      Rate and Rhythm: Normal rate and regular rhythm. Pulses:           Radial pulses are 2+ on the right side and 2+ on the left side. Heart sounds: Normal heart sounds. No murmur heard. No friction rub. No gallop. Pulmonary:      Effort: Pulmonary effort is normal.      Breath sounds: Normal breath sounds. No wheezing, rhonchi or rales. Abdominal:      General: Abdomen is flat. Bowel sounds are normal.      Palpations: Abdomen is soft. Tenderness: There is no abdominal tenderness. There is no guarding. Hernia: A hernia (Hernia extending from RLQ into the LLQ, reducible) is present. Musculoskeletal:         General: Normal range of motion. Cervical back: Normal range of motion and neck supple. Right lower leg: No edema. Left lower leg: No edema. Comments: Decreased AROM of right ankle due to pain. AROM of left ankle intact   Lymphadenopathy:      Cervical: No cervical adenopathy. Skin:     General: Skin is warm and dry. Comments: Atrophic changes of bilateral lower legs. Right foot dressing removed showing a dorsal MTP black eschar of the hallux without discharge. Neurological:      General: No focal deficit present. Mental Status: He is alert and oriented to person, place, and time. Comments: 5/5 muscle strength of bilateral knees and and left foot, 4/5 muscle strength of right ankle   Psychiatric:         Behavior: Behavior normal.         Thought Content:  Thought content normal.                 Laboratory data:   I have independently reviewed the followinglabs:  CBC with Differential:   Recent Labs     11/25/22  1325 11/27/22  0507 11/28/22  0545   WBC 16.3* 9.7 7.7   HGB 14.0 13.6 13.6   HCT 41.4 40.3* 40.9    238 278   LYMPHOPCT 9*  --   --    MONOPCT 10  --   --      BMP:   Recent Labs     11/27/22  0507 11/28/22  0545   * 135   K 3.9 3.8   CL 96* 100   CO2 25 24   BUN 10 9   CREATININE 0.70 0.67*     Hepatic Function Panel:   Recent Labs 11/26/22  0602   PROT 6.1*   LABALBU 3.3*   BILITOT 0.5   ALKPHOS 91   ALT 11   AST 10         Lab Results   Component Value Date/Time    PROCAL 0.52 11/26/2022 06:02 AM     Lab Results   Component Value Date/Time    CRP 71.7 11/28/2022 05:45 AM    .9 11/26/2022 06:02 AM    .2 11/25/2022 01:25 PM     Lab Results   Component Value Date    SEDRATE 70 (H) 11/28/2022         No results found for: DDIMER  No results found for: FERRITIN  No results found for: LDH  No results found for: FIBRINOGEN    No results found for requested labs within last 30 days. No results found for: COVID19    No results for input(s): VANCOTROUGH in the last 72 hours. Imaging Studies:     Narrative   EXAMINATION:   MRI OF THE RIGHT FOOT WITHOUT CONTRAST, 11/26/2022 3:19 pm       TECHNIQUE:   Multiplanar multisequence MRI of the right foot was performed without the   administration of intravenous contrast.       COMPARISON:   None       HISTORY:   ORDERING SYSTEM PROVIDED HISTORY: r/o osteo (wound on hallux probes to bone)   TECHNOLOGIST PROVIDED HISTORY:   r/o osteo (wound on hallux probes to bone)   Reason for Exam: right hallux wound for around 2 weeks and just recently   developed pain and redness. FINDINGS:   LISFRANC JOINT:  Lisfranc ligament is visualized and is normal.  The   alignment of the tarsal-metatarsal joint is anatomic. BONE MARROW: Marrow edema and decreased T1 signal in the shaft and tuft of   the 1st distal phalanx. Irregularity and likely erosive changes involving   the tuft. No fracture or dislocation. No suspicious marrow space-occupying   lesion. JOINTS: Mild 1st metatarsophalangeal degenerative changes. Mild   talonavicular degenerative changes. SOFT TISSUES: Soft tissue ulceration at the distal aspect of the 1st toe with   circumferential subcutaneous edema. Mild dorsal forefoot subcutaneous edema. No drainable fluid collection identified.   Moderate fatty atrophy and Status: Completed Specimen: Blood Updated: 11/27/22 0642    Specimen Description . BLOOD    Special Requests RFA    Culture NO GROWTH 2 DAYS   Culture, Blood 2 [[id number]] Collected: 11/25/22 1510   Order Status: Completed Specimen: Blood Updated: 11/27/22 5902    Specimen Description . BLOOD    Special Requests L FOREARM UNK MLS    Culture NO GROWTH 2 DAYS   Culture, Blood 2 [[id number]] Collected: 11/25/22 2030   Order Status: Canceled Specimen: Blood    Culture, Blood 1 [[id number]]    Order Status: Canceled Specimen: Blood        Medications:      vancomycin  1,500 mg IntraVENous Q12H    insulin glargine  25 Units SubCUTAneous BID    nicotine  1 patch TransDERmal Daily    OLANZapine  5 mg Oral Nightly    rosuvastatin  40 mg Oral Nightly    sodium chloride flush  5-40 mL IntraVENous 2 times per day    aspirin EC  81 mg Oral Daily    sodium chloride flush  5-40 mL IntraVENous 2 times per day    enoxaparin  40 mg SubCUTAneous Daily    famotidine  20 mg Oral BID    cefepime  2,000 mg IntraVENous Q8H    insulin lispro  0-16 Units SubCUTAneous TID WC    insulin lispro  0-4 Units SubCUTAneous Nightly    vancomycin (VANCOCIN) intermittent dosing (placeholder)   Other RX Placeholder           [facility]  [employer]  [employer] messaging  OFFICE: [phone]      Electronically signed by [employer] on 11/28/2022 at 10:02 AM    I have discussed the care of Tk Horton including pertinent history and exam findings,  with the student. I have seen and examined the patient and the key elements of all parts of the encounter have been performed by me. I agree with the assessment, plan and orders as documented by the student. Electronically signed by Coral Basurto MD on 11/28/2022 at 8:26 PM    [employer] messaging [phone]      Thank you for allowing us to participate in the care of this patient. Please call with questions.     This note iscreated with the assistance of a speech recognition program.  While intending to generate a document that actually reflects the content of the visit, the document can still have some errors including those of syntax andsound a like substitutions which may escape proof reading. In such instances, actual meaning can be extrapolated by contextual diversion.

## 2022-11-28 NOTE — PLAN OF CARE
Problem: Discharge Planning  Goal: Discharge to home or other facility with appropriate resources  11/28/2022 0459 by Coral David RN  Outcome: Progressing     Problem: Pain  Goal: Verbalizes/displays adequate comfort level or baseline comfort level  11/28/2022 0459 by Coral David RN  Outcome: Progressing     Problem: Safety - Adult  Goal: Free from fall injury  11/28/2022 0459 by Coral David RN  Outcome: Progressing     Problem: Chronic Conditions and Co-morbidities  Goal: Patient's chronic conditions and co-morbidity symptoms are monitored and maintained or improved  11/28/2022 0459 by Coral David RN  Outcome: Progressing     Problem: Infection - Adult  Goal: Absence of infection at discharge  Outcome: Progressing    Pt overall was lethargic for majority of the day slept through most of the shift. Pt stood up, no complaints of pain or nausea. Pt states desire to put lotion on rt toe, nurse explained the importance of keeping the dressing on and not putting lotion on the toe.

## 2022-11-28 NOTE — PLAN OF CARE
Problem: Discharge Planning  Goal: Discharge to home or other facility with appropriate resources  11/28/2022 1614 by Sonia Morgan RN  Outcome: Progressing     Problem: Pain  Goal: Verbalizes/displays adequate comfort level or baseline comfort level  11/28/2022 1614 by Sonia Morgan RN  Outcome: Progressing     Problem: Safety - Adult  Goal: Free from fall injury  11/28/2022 1614 by Sonia Morgan RN  Outcome: Progressing     Problem: Chronic Conditions and Co-morbidities  Goal: Patient's chronic conditions and co-morbidity symptoms are monitored and maintained or improved  11/28/2022 1614 by Sonia Morgan RN  Outcome: Progressing     Problem: Nutrition Deficit:  Goal: Optimize nutritional status  11/28/2022 1614 by Sonia Morgan RN  Outcome: Progressing     Problem: Infection - Adult  Goal: Absence of infection at discharge  11/28/2022 1614 by Sonia Morgan RN  Outcome: Progressing     Problem: Infection - Adult  Goal: Absence of infection during hospitalization  11/28/2022 1614 by Sonia Morgan RN  Outcome: Progressing     Problem: Infection - Adult  Goal: Absence of fever/infection during anticipated neutropenic period  11/28/2022 1614 by Sonia Morgan RN  Outcome: Progressing

## 2022-11-29 LAB
ANION GAP SERPL CALCULATED.3IONS-SCNC: 8 MMOL/L (ref 9–17)
BUN BLDV-MCNC: 12 MG/DL (ref 6–20)
BUN/CREAT BLD: 13 (ref 9–20)
C-REACTIVE PROTEIN: 58.6 MG/L (ref 0–5)
CALCIUM SERPL-MCNC: 9.5 MG/DL (ref 8.6–10.4)
CHLORIDE BLD-SCNC: 97 MMOL/L (ref 98–107)
CO2: 27 MMOL/L (ref 20–31)
CREAT SERPL-MCNC: 0.9 MG/DL (ref 0.7–1.2)
GFR SERPL CREATININE-BSD FRML MDRD: >60 ML/MIN/1.73M2
GLUCOSE BLD-MCNC: 193 MG/DL (ref 75–110)
GLUCOSE BLD-MCNC: 194 MG/DL (ref 75–110)
GLUCOSE BLD-MCNC: 219 MG/DL (ref 75–110)
GLUCOSE BLD-MCNC: 300 MG/DL (ref 75–110)
GLUCOSE BLD-MCNC: 407 MG/DL (ref 70–99)
POTASSIUM SERPL-SCNC: 4.5 MMOL/L (ref 3.7–5.3)
SEDIMENTATION RATE, ERYTHROCYTE: 81 MM/HR (ref 0–20)
SODIUM BLD-SCNC: 132 MMOL/L (ref 135–144)

## 2022-11-29 PROCEDURE — 6370000000 HC RX 637 (ALT 250 FOR IP): Performed by: FAMILY MEDICINE

## 2022-11-29 PROCEDURE — 86140 C-REACTIVE PROTEIN: CPT

## 2022-11-29 PROCEDURE — 36415 COLL VENOUS BLD VENIPUNCTURE: CPT

## 2022-11-29 PROCEDURE — 97110 THERAPEUTIC EXERCISES: CPT

## 2022-11-29 PROCEDURE — 97530 THERAPEUTIC ACTIVITIES: CPT

## 2022-11-29 PROCEDURE — 97116 GAIT TRAINING THERAPY: CPT

## 2022-11-29 PROCEDURE — 80048 BASIC METABOLIC PNL TOTAL CA: CPT

## 2022-11-29 PROCEDURE — 85652 RBC SED RATE AUTOMATED: CPT

## 2022-11-29 PROCEDURE — 2580000003 HC RX 258: Performed by: INTERNAL MEDICINE

## 2022-11-29 PROCEDURE — 6370000000 HC RX 637 (ALT 250 FOR IP): Performed by: INTERNAL MEDICINE

## 2022-11-29 PROCEDURE — 82947 ASSAY GLUCOSE BLOOD QUANT: CPT

## 2022-11-29 PROCEDURE — 1200000000 HC SEMI PRIVATE

## 2022-11-29 PROCEDURE — 6370000000 HC RX 637 (ALT 250 FOR IP): Performed by: STUDENT IN AN ORGANIZED HEALTH CARE EDUCATION/TRAINING PROGRAM

## 2022-11-29 PROCEDURE — 6360000002 HC RX W HCPCS: Performed by: INTERNAL MEDICINE

## 2022-11-29 RX ORDER — OXYCODONE HYDROCHLORIDE AND ACETAMINOPHEN 5; 325 MG/1; MG/1
1 TABLET ORAL EVERY 8 HOURS PRN
Qty: 5 TABLET | Refills: 0 | Status: SHIPPED | OUTPATIENT
Start: 2022-11-29 | End: 2022-11-30 | Stop reason: SDUPTHER

## 2022-11-29 RX ORDER — PEN NEEDLE, DIABETIC 31 GX5/16"
1 NEEDLE, DISPOSABLE MISCELLANEOUS DAILY
Qty: 100 EACH | Refills: 3 | Status: SHIPPED | OUTPATIENT
Start: 2022-11-29

## 2022-11-29 RX ORDER — INSULIN GLARGINE 100 [IU]/ML
25 INJECTION, SOLUTION SUBCUTANEOUS 2 TIMES DAILY
Qty: 15 ML | Refills: 0 | Status: SHIPPED | OUTPATIENT
Start: 2022-11-29 | End: 2022-12-29

## 2022-11-29 RX ORDER — NICOTINE 21 MG/24HR
1 PATCH, TRANSDERMAL 24 HOURS TRANSDERMAL DAILY
Qty: 30 PATCH | Refills: 3 | Status: SHIPPED | OUTPATIENT
Start: 2022-11-30

## 2022-11-29 RX ORDER — INSULIN LISPRO 100 [IU]/ML
10 INJECTION, SOLUTION INTRAVENOUS; SUBCUTANEOUS
Qty: 9 ML | Refills: 0 | Status: SHIPPED | OUTPATIENT
Start: 2022-11-29 | End: 2022-12-29

## 2022-11-29 RX ORDER — ASPIRIN 81 MG/1
81 TABLET ORAL DAILY
Qty: 30 TABLET | Refills: 3 | Status: SHIPPED | OUTPATIENT
Start: 2022-11-29

## 2022-11-29 RX ORDER — ROSUVASTATIN CALCIUM 40 MG/1
40 TABLET, COATED ORAL NIGHTLY
Qty: 30 TABLET | Refills: 3 | Status: SHIPPED | OUTPATIENT
Start: 2022-11-29

## 2022-11-29 RX ORDER — OLANZAPINE 5 MG/1
5 TABLET ORAL NIGHTLY
Qty: 30 TABLET | Refills: 3 | Status: SHIPPED | OUTPATIENT
Start: 2022-11-29 | End: 2022-12-29

## 2022-11-29 RX ADMIN — ENOXAPARIN SODIUM 40 MG: 100 INJECTION SUBCUTANEOUS at 09:47

## 2022-11-29 RX ADMIN — CEFEPIME 2000 MG: 2 INJECTION, POWDER, FOR SOLUTION INTRAVENOUS at 19:56

## 2022-11-29 RX ADMIN — OXYCODONE AND ACETAMINOPHEN 2 TABLET: 325; 5 TABLET ORAL at 00:31

## 2022-11-29 RX ADMIN — INSULIN LISPRO 3 UNITS: 100 INJECTION, SOLUTION INTRAVENOUS; SUBCUTANEOUS at 18:23

## 2022-11-29 RX ADMIN — INSULIN LISPRO 3 UNITS: 100 INJECTION, SOLUTION INTRAVENOUS; SUBCUTANEOUS at 09:49

## 2022-11-29 RX ADMIN — Medication 10 ML: at 13:23

## 2022-11-29 RX ADMIN — ROSUVASTATIN CALCIUM 40 MG: 40 TABLET, FILM COATED ORAL at 19:46

## 2022-11-29 RX ADMIN — FAMOTIDINE 20 MG: 20 TABLET, FILM COATED ORAL at 19:46

## 2022-11-29 RX ADMIN — CEFEPIME 2000 MG: 2 INJECTION, POWDER, FOR SOLUTION INTRAVENOUS at 13:27

## 2022-11-29 RX ADMIN — FAMOTIDINE 20 MG: 20 TABLET, FILM COATED ORAL at 09:47

## 2022-11-29 RX ADMIN — Medication 10 ML: at 19:52

## 2022-11-29 RX ADMIN — INSULIN LISPRO 3 UNITS: 100 INJECTION, SOLUTION INTRAVENOUS; SUBCUTANEOUS at 13:23

## 2022-11-29 RX ADMIN — Medication 10 ML: at 09:50

## 2022-11-29 RX ADMIN — CEFEPIME 2000 MG: 2 INJECTION, POWDER, FOR SOLUTION INTRAVENOUS at 04:36

## 2022-11-29 RX ADMIN — INSULIN LISPRO 12 UNITS: 100 INJECTION, SOLUTION INTRAVENOUS; SUBCUTANEOUS at 09:47

## 2022-11-29 RX ADMIN — VANCOMYCIN HYDROCHLORIDE 1500 MG: 5 INJECTION, POWDER, LYOPHILIZED, FOR SOLUTION INTRAVENOUS at 02:33

## 2022-11-29 RX ADMIN — INSULIN GLARGINE 30 UNITS: 100 INJECTION, SOLUTION SUBCUTANEOUS at 09:48

## 2022-11-29 RX ADMIN — OXYCODONE AND ACETAMINOPHEN 1 TABLET: 325; 5 TABLET ORAL at 19:48

## 2022-11-29 RX ADMIN — ASPIRIN 81 MG: 81 TABLET, COATED ORAL at 09:47

## 2022-11-29 RX ADMIN — OLANZAPINE 5 MG: 5 TABLET, FILM COATED ORAL at 19:46

## 2022-11-29 RX ADMIN — INSULIN GLARGINE 30 UNITS: 100 INJECTION, SOLUTION SUBCUTANEOUS at 19:49

## 2022-11-29 RX ADMIN — INSULIN LISPRO 4 UNITS: 100 INJECTION, SOLUTION INTRAVENOUS; SUBCUTANEOUS at 18:23

## 2022-11-29 ASSESSMENT — PAIN DESCRIPTION - ORIENTATION
ORIENTATION: RIGHT

## 2022-11-29 ASSESSMENT — ENCOUNTER SYMPTOMS
ABDOMINAL PAIN: 0
DIARRHEA: 0
SINUS PAIN: 0
EYE REDNESS: 0
SHORTNESS OF BREATH: 0
EYE ITCHING: 0
COUGH: 0
RESPIRATORY NEGATIVE: 1
NAUSEA: 0
WHEEZING: 0
ABDOMINAL DISTENTION: 0
GASTROINTESTINAL NEGATIVE: 1
CONSTIPATION: 0

## 2022-11-29 ASSESSMENT — PAIN DESCRIPTION - LOCATION
LOCATION: FOOT;TOE (COMMENT WHICH ONE)
LOCATION: TOE (COMMENT WHICH ONE)
LOCATION: TOE (COMMENT WHICH ONE)

## 2022-11-29 ASSESSMENT — PAIN - FUNCTIONAL ASSESSMENT: PAIN_FUNCTIONAL_ASSESSMENT: PREVENTS OR INTERFERES SOME ACTIVE ACTIVITIES AND ADLS

## 2022-11-29 ASSESSMENT — PAIN SCALES - GENERAL
PAINLEVEL_OUTOF10: 6
PAINLEVEL_OUTOF10: 8

## 2022-11-29 ASSESSMENT — PAIN DESCRIPTION - DESCRIPTORS
DESCRIPTORS: BURNING;SHOOTING;SHARP
DESCRIPTORS: ACHING;DISCOMFORT
DESCRIPTORS: POUNDING

## 2022-11-29 NOTE — PROGRESS NOTES
Physical Therapy  Facility/Department: [facility] PROGRESSIVE CARE  Rehabilitation Physical Therapy Treatment Note    NAME: Radha Gonzalez  : 1965 (56 y.o.)  MRN: [id number]  CODE STATUS: Full Code    Date of Service: 22       Restrictions:  Restrictions/Precautions: Up as Tolerated;General Precautions;Weight Bearing  Lower Extremity Weight Bearing Restrictions  Right Lower Extremity Weight Bearing: Weight Bearing As Tolerated  Position Activity Restriction  Other position/activity restrictions: Up as tolerated and with assist, WBAT to RLE, telemetry, LUE IV, RUE IV     SUBJECTIVE  Subjective  Subjective: Patient awake in bed upon therapists arrival.      OBJECTIVE  Cognition  Overall Cognitive Status: WNL  Arousal/Alertness: Appropriate responses to stimuli  Following Commands: Follows multistep commands consistently; Follows multistep commands with increased time  Attention Span: Appears intact  Memory: Appears intact  Safety Judgement: Decreased awareness of need for assistance;Decreased awareness of need for safety  Problem Solving: Decreased awareness of errors;Assistance required to identify errors made;Assistance required to generate solutions;Assistance required to implement solutions  Insights: Decreased awareness of deficits  Initiation: Requires cues for some  Sequencing: Does not require cues  Cognition Comment: Flat affect, slow to espond when lying in bed, but more alert and engaged when sitting up. Orientation  Overall Orientation Status: Within Normal Limits  Orientation Level: Oriented X4    Functional Mobility  Bed Mobility  Overall Assistance Level: Stand By Assist  Additional Factors: Set-up; Verbal cues; Increased time to complete  Roll Left  Assistance Level: Stand by assist  Roll Right  Assistance Level: Stand by assist  Sit to Supine  Assistance Level: Stand by assist  Skilled Clinical Factors: vc's for hand placement on rail for increased support and stability  Supine to Sit  Assistance Level: Stand by assist  Scooting  Assistance Level: Stand by assist  Skilled Clinical Factors: vc's for scooting all the way out and placing feet flat on the floor for increased stablity and support  Balance  Sitting Balance: Independent  Standing Balance: Contact guard assistance  Standing Balance  Time: 2 minutes  Activity: standing EOB for positional acclimation and vc's for postural correction, WS and marches performed before progression into ambulation techniques. Patient with c/o of minimal discomfort on RLE this date. Transfers  Surface: To bed;From bed  Additional Factors: Verbal cues; Set-up; Increased time to complete  Device: Walker  Sit to Stand  Assistance Level: Stand by assist  Skilled Clinical Factors: VC's to push off flat surface and not reaching for assistive device for safety  Stand to Sit  Assistance Level: Stand by assist  Skilled Clinical Factors: vc's to back all the way up until surface can be felt on the back of both legs before reaching back for bed rail and then slow controlled progression into seated posture. Environmental Mobility  Ambulation  Surface: Level surface  Device: Rolling walker  Distance: 75 feet x2  Activity: Within Unit  Activity Comments: Patient with slow antalgic gait patten  Additional Factors: Verbal cues; Increased time to complete  Assistance Level: Stand by assist;Contact guard assist  Gait Deviations: Slow conchis;Decreased weight shift right; Unsteady gait; Decreased heel strike right;Decreased heel strike left  Skilled Clinical Factors: Patient requries vc's for posture and to look ahead and scan for obstacle's in upcoming path, patient unable to maintain head up posture and returns to looking down at affected limb throughout ambulation. Patinet with decreased steadiness in turns noted this date. NO LOB. Neuromuscular Education  NDT Treatment: Gait ;Lower extremity; Sitting;Standing      PT Exercises  Exercise Treatment: ETTA LE Exercises printed copy given with AP, Marches, LAQ, hip abd, glute sets and core exercises 1 set x10 reps. ASSESSMENT/PROGRESS TOWARDS GOALS     AM-PAC Inpatient Mobility Raw Score  18   AM-PAC Inpatient T-Scale Score  43.63   Mobility Inpatient CMS 0-100% Score 46.58   Mobility Inpatient CMS G-Code Modifier  CK       Assessment  Assessment: Patient with progression in ambulation technique, however note poor postural awareness with a flexed forward posture and head down watching affected limb throughout. Patient would benefit from continued skilled PT treatment to maximize return to PLOF>  Activity Tolerance: Patient tolerated treatment well  Discharge Recommendations: Patient would benefit from continued therapy after discharge  PT Equipment Recommendations  Equipment Needed: Yes  Reba Lorenzo: Rolling    Goals  Patient Goals   Patient Goals : none stated  Short Term Goals  Time Frame for Short Term Goals: 12 visits  Short Term Goal 1: Patient will be indep with transfers. Short Term Goal 2: Patient will amb 100 feet with RW and indep. Short Term Goal 3: Patient will have good- standng balance. Short Term Goal 4: Patient will tolerate 30 minutes of ther-ex and ther-act. Short Term Goal 5: Patient will negotiate 4 stairs with rail and CGA. PLAN OF CARE/SAFETY  Physcial Therapy Plan  General Plan: 5-7 times per week  Current Treatment Recommendations: Transfer training;Gait training;Stair training; Endurance training  Safety Devices  Type of Devices: Gait belt;Call light within reach; Left in bed;Bed alarm in place    EDUCATION  Education  Education Given To: Patient  Education Provided: Role of Therapy;Plan of Care;Home Exercise Program  Education Method: Demonstration;Verbal;Teach Back;Printed Information/Hand-outs  Barriers to Learning: None  Education Outcome: Verbalized understanding        Therapy Time   Individual Concurrent Group Co-treatment   Time In 1050         Time Out 1129         Minutes 39 Mojgan Watters, TOMA, 11/29/22 at 12:49 PM

## 2022-11-29 NOTE — PROGRESS NOTES
Pt out to smoke per self in wheelchair. Pt instructed on no smoking policy and that staff is unable to monitor him if he leaves the unit. Pt verbalizes understanding and still wants to go outside to smoke. Will monitor for patient return.

## 2022-11-29 NOTE — PROGRESS NOTES
[facility]  Progress Note    Hung Steward  MRN: [id number]  Date: 11/29/2022  LOS: 4     Reason for F/U :   Beta Hemolytic Strep Right Foot Infection    Impression :   Right lower extremity peripheral arterial disease  Right hallux ulceration and cellulitis   wound cultures positive for beta hemolytic streptococcus  Distal right hallux osteomyelitis  Poorly controlled Type 2 Diabetes Mellitus  HbA1c of 18.3 on 11/26/22   Ventral Abdominal Wall Herniation  Schizoaffective Disorder    Recommendations:   Wound cultures from 11/25/22 shows Beta Hemolytic Group B streptococci  Healing is complicated by peripheral arterial disease with right ROMARIO of 0.75 and left ROMARIO of 0.99  He continues on intravenous antimicrobial therapy with cefepime and given no resistant gram-positive organisms the vancomycin will be discontinued.     Plans are to transition him to PO Levaquin for discharge as it has good availability and coverage for Streptococci species  Will continue Levaquin for 2 weeks pending podiatry outpatient amputation of right hallux osteomyelitic bone  Podiatry recommends outpatient partial surgical amputation of the right hallux, date unknown, and continued follow up with wound care  Blood cultures obtained 11/25/22 show no growth  Continue to provide daily dressing changes of the right foot  Patient is medically stable for discharge from ID standpoint and currently plans are for discharge home with family    Infection Control Recommendations:   Universal Precautions    Discharge Planning:   Estimated Length of IV antimicrobials: 2 weeks  Patient will need Midline Catheter Insertion/ PICC line Insertion: No  Patient will need: Home IV , [facility],  SNF,  LTAC: Undetermined  Patient willneed outpatient wound care: No    Medical Decision making / Summary of Stay:   Hung Steward is a 56y.o.-year-old male presented to hospital with right foot pain worsening over 2 weeks associated with open wound to the right hallux purulent drainage and redness that is extending to the foot dorsum. Symptoms severe, no alleviating or aggravating factors. He is afebrile. The patient had history of uncontrolled diabetes, last HbA1c 18. 3. He was noted to have ventral hernia, surgery has been consulted. He denied abdominal pain, no nausea or vomiting, no diarrhea, no cough or shortness of breath, no other complaints. Initial WBC was 16.3, glucose 691, creatinine 1, C-reactive protein 196, sedimentation rate 71  Wound swab showed few gram-positive cocci in pairs on gram stain ,pending cultures. Initial lactic acid was 1.5     2022:  He is feeling better, right foot pain better controlled, denied fever or chills, denied nausea or vomiting, no other complaints. Current evaluation:2022    /82   Pulse 89   Temp 99 °F (37.2 °C) (Oral)   Resp 16   Ht 6' 3\" (1.905 m)   Wt 177 lb (80.3 kg)   SpO2 97%   BMI 22.12 kg/m²     Temperature Range: Temp: 99 °F (37.2 °C) Temp  Av.7 °F (37.1 °C)  Min: 98.4 °F (36.9 °C)  Max: 99 °F (37.2 °C)    Patient examined at bedside, alert and awake  No apparent distress, Vital signs stable, afebrile  Pain of right foot is more controlled, currently rated 6/10, no other complaints    Review of Systems   Constitutional: Negative. Negative for chills, fatigue and fever. Respiratory: Negative. Negative for cough, shortness of breath and wheezing. Cardiovascular: Negative. Negative for chest pain. Gastrointestinal: Negative. Negative for abdominal pain, constipation, diarrhea and nausea. Genitourinary: Negative. Negative for dysuria. Musculoskeletal:  Positive for arthralgias. Negative for gait problem and myalgias. Throbbing, constant pain of the right foot rated 6-7/10 overnight   Neurological: Negative. Negative for weakness and numbness.         Positive for intermittent paresthesia     Physical Examination :     Physical Exam  Constitutional:       General: He is not in acute distress. Appearance: Normal appearance. He is normal weight. He is not ill-appearing. HENT:      Head: Normocephalic and atraumatic. Cardiovascular:      Rate and Rhythm: Normal rate and regular rhythm. Pulses:           Radial pulses are 2+ on the right side and 2+ on the left side. Posterior tibial pulses are 2+ on the left side. Right posterior tibial pulse not accessible. Heart sounds: Normal heart sounds. No murmur heard. No friction rub. No gallop. Comments: Unable to appreciate right foot pulses d/t wraps  Pulmonary:      Effort: Pulmonary effort is normal.      Breath sounds: Normal breath sounds. No wheezing, rhonchi or rales. Abdominal:      General: Abdomen is flat. Bowel sounds are normal.      Palpations: Abdomen is soft. Tenderness: There is no abdominal tenderness. There is no guarding. Hernia: A hernia (Hernia extending from RLQ into the LLQ, reducible) is present. Musculoskeletal:         General: Normal range of motion. Cervical back: Normal range of motion and neck supple. Right lower leg: No edema. Left lower leg: No edema. Comments: Decreased AROM of right ankle due to pain. AROM of left ankle intact   Lymphadenopathy:      Cervical: No cervical adenopathy. Skin:     General: Skin is warm and dry. Comments: Atrophic changes of bilateral lower legs. Right foot dressing not removed. Neurological:      General: No focal deficit present. Mental Status: He is alert and oriented to person, place, and time. Comments: 5/5 muscle strength of bilateral knees and and left foot, 4/5 muscle strength of right ankle   Psychiatric:         Behavior: Behavior normal.         Thought Content:  Thought content normal.                 Laboratory data:   I have independently reviewed the followinglabs:  CBC with Differential:   Recent Labs     11/27/22  1412 11/28/22  4548 WBC 9.7 7.7   HGB 13.6 13.6   HCT 40.3* 40.9    278       BMP:   Recent Labs     11/28/22 0545 11/29/22 0522    132*   K 3.8 4.5    97*   CO2 24 27   BUN 9 12   CREATININE 0.67* 0.90       Hepatic Function Panel:   No results for input(s): PROT, LABALBU, BILIDIR, IBILI, BILITOT, ALKPHOS, ALT, AST in the last 72 hours. Lab Results   Component Value Date/Time    PROCAL 0.17 11/28/2022 05:45 AM    PROCAL 0.52 11/26/2022 06:02 AM     Lab Results   Component Value Date/Time    CRP 71.7 11/28/2022 05:45 AM    .9 11/26/2022 06:02 AM    .2 11/25/2022 01:25 PM     Lab Results   Component Value Date    SEDRATE 81 (H) 11/29/2022         No results found for: DDIMER  No results found for: FERRITIN  No results found for: LDH  No results found for: FIBRINOGEN    No results found for requested labs within last 30 days. No results found for: COVID19    No results for input(s): VANCOTROUGH in the last 72 hours. Imaging Studies:     Narrative   EXAMINATION:   MRI OF THE RIGHT FOOT WITHOUT CONTRAST, 11/26/2022 3:19 pm       TECHNIQUE:   Multiplanar multisequence MRI of the right foot was performed without the   administration of intravenous contrast.       COMPARISON:   None       HISTORY:   ORDERING SYSTEM PROVIDED HISTORY: r/o osteo (wound on hallux probes to bone)   TECHNOLOGIST PROVIDED HISTORY:   r/o osteo (wound on hallux probes to bone)   Reason for Exam: right hallux wound for around 2 weeks and just recently   developed pain and redness. FINDINGS:   LISFRANC JOINT:  Lisfranc ligament is visualized and is normal.  The   alignment of the tarsal-metatarsal joint is anatomic. BONE MARROW: Marrow edema and decreased T1 signal in the shaft and tuft of   the 1st distal phalanx. Irregularity and likely erosive changes involving   the tuft. No fracture or dislocation. No suspicious marrow space-occupying   lesion.        JOINTS: Mild 1st metatarsophalangeal degenerative changes. Mild   talonavicular degenerative changes. SOFT TISSUES: Soft tissue ulceration at the distal aspect of the 1st toe with   circumferential subcutaneous edema. Mild dorsal forefoot subcutaneous edema. No drainable fluid collection identified. Moderate fatty atrophy and mild   edema of the forefoot musculature compatible with diabetic myopathy. TENDONS: The visualized tendons are intact without tenosynovitis. Lower Arterial Plethysmography 11/25/22 at 17:18  Right Impression:  Mildly abnormal PVR waveform at all cuff levels. Resting ROMARIO: PT 0.71, DP 0.75. Blood pressure could not be obtained due to IV Line access. PPG waveform could not be obtained at the level of 1st toe due  Left Impression:  Mildly abnormal PVR waveform at all cuff levels. Resting ROMARIO: PT 0.99, DP 0.84. Narrative   EXAMINATION:   THREE XRAY VIEWS OF THE RIGHT FOOT       11/25/2022 1:33 pm       COMPARISON:   07/06/2021       HISTORY:   ORDERING SYSTEM PROVIDED HISTORY: Pain   TECHNOLOGIST PROVIDED HISTORY:   Pain   Reason for Exam: Pain to right foot. States injury to 1-3 toes. 61-year-old male who complains of right foot pain; injury to the 1-3 toes       FINDINGS:   Pes planus. Mild diffuse soft tissue edema throughout the right foot. Mild   plantar calcaneal spur. Mild degenerative changes of the midfoot and   tibiotalar joints. Mild degenerative change of the TMT joints. No marginal   erosions are identified. No acute fracture or gross dislocation is seen. The medial and middle cuneiforms demonstrate proper alignment with the base   of the 1st and 2nd metatarsals respectively. No tibiotalar joint effusion is seen. Boehler's angle is maintained. Ulceration/irregularity of the great toe.            Cultures:     Procedure Component Value Units Date/Time   Culture, Blood 1 [[id number]] Collected: 11/25/22 6365   Order Status: Completed Specimen: Blood Updated: 11/28/22 7647 Specimen Description . BLOOD    Special Requests RFA    Culture NO GROWTH 3 DAYS   Culture, Blood 2 [[id number]] Collected: 11/25/22 1510   Order Status: Completed Specimen: Blood Updated: 11/28/22 1711    Specimen Description . BLOOD    Special Requests L FOREARM UNK MLS    Culture NO GROWTH 3 DAYS   Culture, Anaerobic and Aerobic [[id number]] (Abnormal) Collected: 11/25/22 1621   Order Status: Completed Specimen: Foot Updated: 11/28/22 0801    Specimen Description . FOOT    Direct Exam RARE NEUTROPHILS Abnormal      FEW GRAM POSITIVE COCCI IN PAIRS Abnormal     Culture STREPTOCOCCI, BETA HEMOLYTIC GROUP B HEAVY GROWTH Abnormal      No anaerobic organisms isolated at 3 days. Medications:      vancomycin  1,500 mg IntraVENous Q12H    insulin glargine  30 Units SubCUTAneous BID    insulin lispro  3 Units SubCUTAneous TID WC    nicotine  1 patch TransDERmal Daily    OLANZapine  5 mg Oral Nightly    rosuvastatin  40 mg Oral Nightly    sodium chloride flush  5-40 mL IntraVENous 2 times per day    aspirin EC  81 mg Oral Daily    sodium chloride flush  5-40 mL IntraVENous 2 times per day    enoxaparin  40 mg SubCUTAneous Daily    famotidine  20 mg Oral BID    cefepime  2,000 mg IntraVENous Q8H    insulin lispro  0-16 Units SubCUTAneous TID WC    insulin lispro  0-4 Units SubCUTAneous Nightly    vancomycin (VANCOCIN) intermittent dosing (placeholder)   Other RX Placeholder           [facility]  [facility]  [employer] messaging  OFFICE: [phone]      Electronically signed by [employer] on 11/29/2022 at 8:47 AM    I have discussed the care of Bonita Alaniz including pertinent history and exam findings,  with the student. I have seen and examined the patient and the key elements of all parts of the encounter have been performed by me. I agree with the assessment, plan and orders as documented by the student.      Electronically signed by Odilon Núñez MD on 11/29/2022 at 10:18 PM      [employer] messaging [phone]      Thank you for allowing us to participate in the care of this patient. Please call with questions. This note iscreated with the assistance of a speech recognition program.  While intending to generate a document that actually reflects the content of the visit, the document can still have some errors including those of syntax andsound a like substitutions which may escape proof reading. In such instances, actual meaning can be extrapolated by contextual diversion.

## 2022-11-29 NOTE — CARE COORDINATION
Discharge planning    Call to [facility] (patient guardian) at [phone] and updated on appointment at [facility] wound care clinic tomorrow.

## 2022-11-29 NOTE — PROGRESS NOTES
Occupational Therapy  Facility/Department: [facility]  Rehabilitation Occupational Therapy Daily Treatment Note    Date: 22  Patient Name: Ann Muñoz       Room: 1002/1002-02  MRN: [id number]  Account: [de-identified]   : 1965  (56 y.o.) Gender: male         Past Medical History:  has a past medical history of Acute encephalopathy, Acute kidney injury ([facility] [address] Homelessness, Hyperglycemia, Ketonemia, Lactic acidosis, Noncompliance, Schizoaffective disorder ([facility] [address] Tobacco abuse, Type 2 diabetes mellitus with hyperosmolar nonketotic hyperglycemia ([facility] [address] Ventral hernia, Wound of abdomen, and Wound of left ankle. Past Surgical History:   has a past surgical history that includes Arm Surgery. Restrictions  Restrictions/Precautions: Up as Tolerated;General Precautions;Weight Bearing  Other position/activity restrictions: Up as tolerated and with assist, WBAT to RLE (patient treats RLE as NWB), telemetry, LUE IV, RUE IV  Right Lower Extremity Weight Bearing: Weight Bearing As Tolerated  Required Braces or Orthoses?: No    Subjective  Subjective: \"It hurts my toe to sit up in the chair, but I'll sit here for a little bit. \"  Restrictions/Precautions: Up as Tolerated;General Precautions;Weight Bearing     Objective     Cognition  Overall Cognitive Status: WNL  Arousal/Alertness: Appropriate responses to stimuli  Following Commands: Follows multistep commands consistently; Follows multistep commands with increased time  Attention Span: Appears intact  Memory: Appears intact  Safety Judgement: Decreased awareness of need for assistance;Decreased awareness of need for safety  Problem Solving: Decreased awareness of errors;Assistance required to identify errors made;Assistance required to generate solutions;Assistance required to implement solutions  Insights: Decreased awareness of deficits  Initiation: Requires cues for some  Sequencing: Does not require cues  Orientation  Overall Orientation Status: Within Normal Limits  Orientation Level: Oriented X4         ADL  Grooming/Oral Hygiene  Skilled Clinical Factors: patient verbalized no needs for ADL's this date          Functional Mobility  Device: Rolling walker  Assistance Level: Contact guard assist  Skilled Clinical Factors: Patient verbalized he was just up in the hallway walking, but he is willing to sit in the chair. Patient completed 3-4 hops with RW from EOB>chair with CGA for safety. Patient demonstrated good slow controlled movements during this. Patient verbalized his sister will be getting him a cane to help with mobility, this writer educated patient on safety concerns with patient treating his RLE as NWB with a cane. Educated patient on trying to put weight through his heel as tolerated when using the cane at home to provide more stability with mobility. Patient verbalized understanding. Bed Mobility  Overall Assistance Level: Independent  Roll Left  Assistance Level: Independent  Roll Right  Assistance Level: Independent  Sit to Supine  Assistance Level: Independent  Supine to Sit  Assistance Level: Independent  Scooting  Assistance Level: Independent  Transfers  Additional Factors: Hand placement cues; Verbal cues  Device: Walker  Sit to Stand  Assistance Level: Contact guard assist;Stand by assist  Skilled Clinical Factors: Mod vc's for use of RW, RW safety, pushing up from surface when with hands when standing, squaring self/AD and reaching back prior to sitting, upright posture, controlled sit/stand, pacing, and overall safety. Stand to Sit  Assistance Level: Contact guard assist;Stand by assist         Assessment  Assessment  Activity Tolerance: Patient tolerated treatment well  Discharge Recommendations: Patient would benefit from continued therapy after discharge  OT Equipment Recommendations  Equipment Needed: Yes  Mobility Devices: Arlander Baston; ADL Assistive Devices  Walker: Rolling  ADL Assistive Devices:  Shower Chair with back  Safety Devices  Safety Devices in place: Yes  Type of devices: Gait belt;Left in chair;Chair alarm in place;Nurse notified;Call light within reach; All fall risk precautions in place    Patient Education  Education  Education Given To: Patient  Education Provided: Role of Therapy; Energy Conservation; Fall Prevention Strategies;Transfer Training;ADL Function;Mobility Training;DME/Home Modifications;Precautions; Safety;Plan of Care  Education Method: Verbal  Education Outcome: Verbalized understanding;Continued education needed    Plan  Occupational Therapy Plan  Times Per Week: 4-5x/week 1-2x/day as tolerated  Current Treatment Recommendations: Balance training;Functional mobility training; Endurance training; Safety education & training;Pain management;Patient/Caregiver education & training;Self-Care / ADL; Home management training;Equipment evaluation, education, & procurement;Positioning;Cognitive/Perceptual training    Goals  Patient Goals   Patient goals : To decrease pain! Short Term Goals  Time Frame for Short Term Goals: By discharge, pt to demo:  Short Term Goal 1: bed mobility tasks with MOD I/IND and use of bedrails as needed. Short Term Goal 2: ADL transfers and functional mobility tasks with SUP and Good safety with use of AD as needed. Short Term Goal 3: LB ADLs to SUP and Good safety with use of AE/compensatory strategies/AD as needed. Short Term Goal 4: IND with a BUE HEP, with use of handouts as needed, in order to maintain current strength/ROM required for safety and IND with self care tasks. Short Term Goal 5: Pt/family to be IND with WB precautions, fall prevention strategies, EC/WS tech, compensatory ADL strategies, and discharge/equipment recommendations with use of handouts as needed.     AM-PAC Score        [facility] Inpatient Daily Activity Raw Score: 20 (11/29/22 1236)  AM-PAC Inpatient ADL T-Scale Score : 42.03 (11/29/22 1236)  ADL Inpatient CMS 0-100% Score: 38.32 (11/29/22 1236)  ADL Inpatient CMS G-Code Modifier : CJ (11/29/22 1236)      Therapy Time   Individual Concurrent Group Co-treatment   Time In 1687         Time Out 1150         Minutes 15          Upon writer exit, call light within reach, pt retired to chair. All lines intact and patient positioned comfortably. All patient needs addressed prior to ending therapy session. Chart reviewed prior to treatment and patient is agreeable for therapy. RN reports patient is medically stable for therapy treatment this date.      EVELYN Barreto/TOMY

## 2022-11-29 NOTE — PROGRESS NOTES
[facility]  Office: [address], DO, Bettye Diego, DO, Soo Albarado, DO, Jose Luis Rider Blood, DO, Leandra Morel MD, Delmy Ospina MD, Yvone Phalen, MD, Jose Miguel Goff MD,  Homer Hashimoto, MD, Michele Perdomo MD, Km Be DO, Ирина Cristobal MD,  Rhona Arrieta MD, Fiorella Napier MD, Kunal John DO, Crys Hanna MD, Lidia Card MD, Cristobal Carter DO, Doreen Saucedo MD, Dave Lopez MD, Cindi Ventura MD, Coco Devries MD, Jennifer Brantley DO, Agustin Leiva MD, Seth Nguyễn MD, Cat Tejeda, Tony Hunter, CNP, Zak Petty, CNP, Mg Patel, CNP,  Chary Agrawal, [facility], Jaclyn Hdez, CNP, Randell Bansal, CNP, Jessica Boo, CNP, Maru Reddy, CNP, Emilia Echevarria, CNP, Shaylee Maynard PA-C, Adina Her, CNS, Saurav Alfredo, [facility], Doroteo Cai, CNP, Matheus Webster, CNP, Macro Sterling, [facility]    Progress Note    11/29/2022    9:42 AM    Name:   Kan Gonzalez  MRN:     [id number]     Acct:      [de-identified]   Room:   [address] [place] [address] Day:  4  Admit Date:  11/25/2022  1:14 PM    PCP:   No primary care provider on file. Code Status:  Full Code    Subjective:     C/C:   Chief Complaint   Patient presents with    Toe Pain     \"Think I hit it on something a week or 2 ago\" hx diabetes and not taking prescribed metformin      Interval History Status: not changed. Patietn seen and examined. Feeling well. No acute issues overnight. Feeling well, knows that he has to follow up with podiatry. Brief History:     60-year-old male past medical history of schizoaffective disorder, diabetes type 2, ventral hernia, peripheral neuropathy, noncompliant presents to the ER with right foot pain after hitting something on his foot a few weeks prior. Patient seen and evaluated by podiatry, surgery, infectious disease.   Currently on vancomycin and cefepime for osteomyelitis to be changed to levaquin for 2 weeks. And to follow up with podiatry and wound care as outpatient. Review of Systems:     Review of Systems   Constitutional:  Positive for fatigue. Negative for activity change and fever. HENT:  Negative for congestion, sinus pain and sneezing. Eyes:  Negative for redness and itching. Respiratory:  Negative for cough and shortness of breath. Cardiovascular:  Negative for chest pain, palpitations and leg swelling. Gastrointestinal:  Negative for abdominal distention, constipation and diarrhea. Endocrine: Negative for cold intolerance and heat intolerance. Genitourinary:  Negative for difficulty urinating and dysuria. Musculoskeletal:  Negative for arthralgias and myalgias. Skin:  Positive for wound. Neurological:  Negative for dizziness, syncope and weakness. Psychiatric/Behavioral:  Negative for agitation and behavioral problems. Medications:      Allergies:  No Known Allergies    Current Meds:   Scheduled Meds:    vancomycin  1,500 mg IntraVENous Q12H    insulin glargine  30 Units SubCUTAneous BID    insulin lispro  3 Units SubCUTAneous TID WC    nicotine  1 patch TransDERmal Daily    OLANZapine  5 mg Oral Nightly    rosuvastatin  40 mg Oral Nightly    sodium chloride flush  5-40 mL IntraVENous 2 times per day    aspirin EC  81 mg Oral Daily    sodium chloride flush  5-40 mL IntraVENous 2 times per day    enoxaparin  40 mg SubCUTAneous Daily    famotidine  20 mg Oral BID    cefepime  2,000 mg IntraVENous Q8H    insulin lispro  0-16 Units SubCUTAneous TID WC    insulin lispro  0-4 Units SubCUTAneous Nightly    vancomycin (VANCOCIN) intermittent dosing (placeholder)   Other RX Placeholder     Continuous Infusions:    dextrose      sodium chloride      sodium chloride 10 mL/hr at 11/27/22 0523     PRN Meds: glucose, dextrose bolus **OR** dextrose bolus, glucagon (rDNA), dextrose, sodium chloride flush, sodium chloride, sodium chloride flush, sodium chloride, acetaminophen **OR** acetaminophen, morphine **OR** morphine, oxyCODONE-acetaminophen **OR** oxyCODONE-acetaminophen    Data:     Past Medical History:   has a past medical history of Acute encephalopathy, Acute kidney injury ([facility] Homelessness, Hyperglycemia, Ketonemia, Lactic acidosis, Noncompliance, Schiz[facility]ffective disorder ([facility] Tobacco abuse, Type 2 diabetes mellitus with hyperosmolar nonketotic hyperglycemia ([facility] Ventral hernia, Wound of abdomen, and Wound of left ankle. Social History:   reports that he has been smoking cigarettes. He has been smoking an average of 1 pack per day. He has never used smokeless tobacco. He reports that he does not drink alcohol and does not use drugs. Family History:   Family History   Problem Relation Age of Onset    Cancer Mother     Cancer Father        Vitals:  /82   Pulse 89   Temp 99 °F (37.2 °C) (Oral)   Resp 16   Ht 6' 3\" (1.905 m)   Wt 177 lb (80.3 kg)   SpO2 97%   BMI 22.12 kg/m²   Temp (24hrs), Av.7 °F (37.1 °C), Min:98.4 °F (36.9 °C), Max:99 °F (37.2 °C)    Recent Labs     22  1121 22  1630 22  1936 22  0712   POCGLU 297* 254* 253* 300*       I/O (24Hr):     Intake/Output Summary (Last 24 hours) at 2022 0942  Last data filed at 2022 0876  Gross per 24 hour   Intake 324.41 ml   Output 3251 ml   Net -2926.59 ml       Labs:  Hematology:  Recent Labs     22  0507 22  0545 22  0522   WBC 9.7 7.7  --    RBC 4.50 4.57  --    HGB 13.6 13.6  --    HCT 40.3* 40.9  --    MCV 89.6 89.5  --    MCH 30.2 29.8  --    MCHC 33.7 33.3  --    RDW 11.8 11.7*  --     278  --    MPV 10.1 10.1  --    SEDRATE 65* 70* 81*   CRP  --  71.7*  --      Chemistry:  Recent Labs     22  0507 22  0545 22  05   * 135 132*   K 3.9 3.8 4.5   CL 96* 100 97*   CO2 25 24 27   GLUCOSE 226* 201* 407*   BUN 10 9 12   CREATININE 0.70 0.67* 0.90   ANIONGAP 11 11 8*   LABGLOM >60 >60 >60 CALCIUM 9.3 9.3 9.5   TROPHS 13  --   --      Recent Labs     11/27/22  0507 11/27/22  0812 11/27/22  1944 11/28/22  0807 11/28/22  1121 11/28/22  1630 11/28/22  1936 11/29/22  0712   CHOL 220*  --   --   --   --   --   --   --    HDL 48  --   --   --   --   --   --   --    LDLCHOLESTEROL 137*  --   --   --   --   --   --   --    CHOLHDLRATIO 4.6  --   --   --   --   --   --   --    TRIG 177*  --   --   --   --   --   --   --    POCGLU  --    < > 293* 200* 297* 254* 253* 300*    < > = values in this interval not displayed. ABG:  Lab Results   Component Value Date/Time    FIO2 NOT REPORTED 12/24/2016 02:47 AM     Lab Results   Component Value Date/Time    SPECIAL L FOREARM UNK MLS 11/25/2022 03:10 PM     Lab Results   Component Value Date/Time    CULTURE STREPTOCOCCI, BETA HEMOLYTIC GROUP B HEAVY GROWTH (A) 11/25/2022 04:21 PM    CULTURE No anaerobic organisms isolated at 3 days. 11/25/2022 04:21 PM       Radiology:  XR FOOT RIGHT (MIN 3 VIEWS)    Result Date: 11/25/2022  1. Soft tissue ulceration/irregularity of the great toe. 2. Mild degenerative changes as detailed above. Pes planus. 3. No acute fracture or dislocation. 4. Mild plantar calcaneal spur. MRI FOOT RIGHT WO CONTRAST    Result Date: 11/26/2022  1. Soft tissue ulceration at the distal aspect of the 1st toe with underlying cellulitis. No well-defined drainable fluid collection identified. 2. Osteomyelitis of the tuft and shaft of the 1st distal phalanx. Physical Examination:        Physical Exam  Constitutional:       Appearance: He is not ill-appearing or diaphoretic. HENT:      Head: Normocephalic and atraumatic. Nose: Nose normal.      Mouth/Throat:      Mouth: Mucous membranes are moist.   Eyes:      Pupils: Pupils are equal, round, and reactive to light. Cardiovascular:      Rate and Rhythm: Normal rate and regular rhythm. Pulses: Normal pulses. Heart sounds: No murmur heard.   Pulmonary:      Effort: Pulmonary effort is normal. No respiratory distress. Breath sounds: No wheezing. Abdominal:      General: Bowel sounds are normal. There is no distension. Palpations: Abdomen is soft. Musculoskeletal:      Cervical back: Neck supple. Right lower leg: No edema. Left lower leg: No edema. Skin:     General: Skin is warm and dry. Capillary Refill: Capillary refill takes less than 2 seconds. Comments: Right lower extremity wound dressed    Neurological:      General: No focal deficit present. Mental Status: He is alert and oriented to person, place, and time.    Psychiatric:         Mood and Affect: Mood normal.         Behavior: Behavior normal.       Assessment:        Hospital Problems             Last Modified POA    * (Principal) Sepsis ([address] 11/25/2022 Yes    Cellulitis of right foot 11/25/2022 Yes    Osteomyelitis of right foot ([address] 11/27/2022 Yes    Ventral hernia without obstruction or gangrene 11/25/2022 Yes    Hyperglycemia 11/25/2022 Yes    Homeless single person 11/26/2022 Yes    Type 2 diabetes mellitus with hyperglycemia, with long-term current use of insulin ([address] 11/25/2022 Yes    Uncontrolled type 2 diabetes mellitus with hyperglycemia, without long-term current use of insulin ([address] 11/26/2022 Yes    Schizoaffective disorder ([address] 11/26/2022 Yes    Tobacco abuse 11/25/2022 Yes       Plan:        Continue levaquin  Insulin for daibetes  Nicoderm patch  Has appointment with wound care on 11/30/22  Discharge planning today    Sally Bowie MD  11/29/2022  9:42 AM

## 2022-11-29 NOTE — PROGRESS NOTES
[facility] Pharmacokinetic Monitoring Service - Vancomycin    Consulting Provider: Dr. Clif Walton    Indication: SSTI  Target Concentration: Goal trough of 10-15 mg/L and AUC/LIMA <500 mg*hr/L  Day of Therapy: 5  Additional Antimicrobials: cefepime    Pertinent Laboratory Values: Wt Readings from Last 1 Encounters:   11/29/22 177 lb (80.3 kg)     Temp Readings from Last 1 Encounters:   11/29/22 98.8 °F (37.1 °C) (Axillary)     Estimated Creatinine Clearance: 105 mL/min (based on SCr of 0.9 mg/dL). Recent Labs     11/27/22  0507 11/28/22  0545 11/29/22  0522   CREATININE 0.70 0.67* 0.90   WBC 9.7 7.7  --      Procalcitonin: pending    Pertinent Cultures:  Culture Date Source Results   11/25/2022 Foot Rare neutrophils   Few gram + cocci  Streptococci, beta hemolytic group B heavy growth    MRSA Nasal Swab: N/A. Non-respiratory infection.     Recent vancomycin administrations                     vancomycin (VANCOCIN) 1,250 mg in dextrose 5 % 250 mL IVPB (mg) 1,250 mg New Bag 11/28/22 0244     1,250 mg New Bag 11/27/22 1621     1,250 mg New Bag  0324     1,250 mg New Bag 11/26/22 1541    vancomycin 1000 mg IVPB in 250 mL D5W addavial (mg) 1,000 mg New Bag 11/26/22 0332    vancomycin (VANCOCIN) 2,500 mg in dextrose 5 % 500 mL IVPB (mg) 2,500 mg New Bag 11/25/22 1513                    Assessment:  Date/Time Current Dose Concentration Timing of Concentration (h) AUC   11/28/22 1250 mg Q12H 14.6 3 hours 1 min 357   Note: Serum concentrations collected for AUC dosing may appear elevated if collected in close proximity to the dose administered, this is not necessarily an indication of toxicity    Plan:  Current dose at IV 1500 mg Q12H  Repeat vancomycin concentration ordered for 11/30 @ 0600   Pharmacy will continue to monitor patient and adjust therapy as indicated    Thank you for the consult,  Chai Henderson  11/29/2022 7:20 AM

## 2022-11-29 NOTE — PROGRESS NOTES
Patients sister and brother-in-law Humberto Mckoy and Stephany Wallace) in department with discharge concerns. After speaking with patient and family, both agree that a skilled nursing facility may benefit patient prior to discharge home with them. PT notes recommend continued therapy. Pt is agreeable to SNF. Will notify attending and continue D/C planning in AM when case mgmt/social work are available. Charli states that the best time to reach her or her , Stephany Wallace, is between 10 AM and 2 PM. Pt 8 AM apt with [facility] wound clinic will need to be rescheduled. Secure message sent to Dr. Rosas Session to update on situation.

## 2022-11-29 NOTE — PLAN OF CARE
Problem: Discharge Planning  Goal: Discharge to home or other facility with appropriate resources  11/29/2022 0459 by Tres Marinelli RN  Outcome: Progressing     Problem: Pain  Goal: Verbalizes/displays adequate comfort level or baseline comfort level  11/29/2022 0459 by Tres Marinelli RN  Outcome: Progressing     Problem: Safety - Adult  Goal: Free from fall injury  11/29/2022 0459 by Tres Marinelli RN  Outcome: Progressing     Problem: Chronic Conditions and Co-morbidities  Goal: Patient's chronic conditions and co-morbidity symptoms are monitored and maintained or improved  11/29/2022 0459 by Tres Marinelli RN  Outcome: Progressing     Problem: Nutrition Deficit:  Goal: Optimize nutritional status  11/29/2022 0459 by Tres Marinelli RN  Outcome: Progressing     Problem: Infection - Adult  Goal: Absence of infection at discharge  11/29/2022 0459 by Tres Marinelli RN  Outcome: Progressing     Problem: Infection - Adult  Goal: Absence of infection during hospitalization  11/29/2022 0459 by Tres Marinelli RN  Outcome: Progressing     Problem: Infection - Adult  Goal: Absence of fever/infection during anticipated neutropenic period  11/29/2022 0459 by Tres Marinelli RN  Outcome: Progressing     Problem: Skin/Tissue Integrity  Goal: Absence of new skin breakdown  Description: 1. Monitor for areas of redness and/or skin breakdown  2. Assess vascular access sites hourly  3. Every 4-6 hours minimum:  Change oxygen saturation probe site  4. Every 4-6 hours:  If on nasal continuous positive airway pressure, respiratory therapy assess nares and determine need for appliance change or resting period. Outcome: Progressing  Pt had an okay day. Pt pulled out IV, Iv replaced. Pt unwrapped foot. Staff re wrapped foot, cleansed foot with betadine, wrapped with curlex, and ace wrap. Pt was up to the bathroom, had linens changed.  Bed in lowest position, bed locked, call light within reach.

## 2022-11-29 NOTE — DISCHARGE SUMMARY
[facility]  Office: [address], DO, Sophia Larrydarryn, DO, Lazarus Monroy, DO, Christopher Colon Blood, DO, Mayelin Chance MD, Mack Dancer, MD, April Coelho MD, Kan Hobson MD,  Osbaldo Mendoza MD, Rasheed Bermeo MD, Harry Hill, DO, Obey Willard MD,  Boom Carmichael MD, Carlos Moralez MD, Stephanie Louis, DO, Shanna Hadley MD, Sergey Hoyos MD, Nadeen Fernando, DO, Diana Dobson MD, Marilin Kemp MD, John Willett MD, Michael Brewer MD, Belinda Oseguera, DO, Erik Cohen MD, Lyndsey Ojeda MD, Nicolle Colbert, CNP,  Angie Sidhu, CNP, Sameer Miller, CNP, Ken Shi, CNP,  Maxine Santos, DNP, Eleno Florence, CNP, Maira Kim, CNP, Edgardo Montiel, CNP, Ghulam Emery, CNP, Dimitri Spicer, CNP, Rhett Cordero PA-C, Ashlie Bermeo, CNS, Ziggy Peña, DNP, Juan Johnson, CNP, Elayne Shay, CNP, Unknown Pump, CNP         [facility]    Discharge Summary     Patient ID: Hung Steward  :  1965   MRN: [id number]     ACCOUNT:  [de-identified]   Patient's PCP: No primary care provider on file. Admit Date: 2022   Discharge Date: 2022     Length of Stay: 4  Code Status:  Full Code  Admitting Physician: Obey Willard MD  Discharge Physician: Obey Willard MD     Active Discharge Diagnoses:     Hospital Problem Lists:  Principal Problem:    Sepsis [facility])  Active Problems:    Cellulitis of right foot    Osteomyelitis of right foot ([facility]    Ventral hernia without obstruction or gangrene    Hyperglycemia    Homeless single person    Type 2 diabetes mellitus with hyperglycemia, with long-term current use of insulin ([facility]    Uncontrolled type 2 diabetes mellitus with hyperglycemia, without long-term current use of insulin ([facility] [address]    Schizoaffective disorder ([facility] [address]    Tobacco abuse  Resolved Problems:    * No resolved hospital problems.  *      Admission Condition: stable     Discharged Condition: stable    Hospital Stay:     Hospital Course:  Danica Kulkarni is a 56 y.o. male who was admitted for the management of  Sepsis ([address] , presented to ER with Toe Pain (\"Think I hit it on something a week or 2 ago\" hx diabetes and not taking prescribed metformin )    61-year-old male past medical history of schizoaffective disorder, diabetes type 2, ventral hernia, peripheral neuropathy, noncompliant presents to the ER with right foot pain after hitting something on his foot a few weeks prior. Patient seen and evaluated by podiatry, surgery, infectious disease. Currently on vancomycin and cefepime for osteomyelitis to be changed to levaquin for 2 weeks. And to follow up with podiatry and wound care as outpatient. Significant therapeutic interventions: see above    Significant Diagnostic Studies:   Labs / Micro:  CBC:   Lab Results   Component Value Date/Time    WBC 7.7 11/28/2022 05:45 AM    RBC 4.57 11/28/2022 05:45 AM    RBC 4.99 03/11/2020 10:15 PM    HGB 13.6 11/28/2022 05:45 AM    HCT 40.9 11/28/2022 05:45 AM    MCV 89.5 11/28/2022 05:45 AM    MCH 29.8 11/28/2022 05:45 AM    MCHC 33.3 11/28/2022 05:45 AM    RDW 11.7 11/28/2022 05:45 AM     11/28/2022 05:45 AM     BMP:    Lab Results   Component Value Date/Time    GLUCOSE 407 11/29/2022 05:22 AM    GLUCOSE 595 03/11/2020 10:15 PM     11/29/2022 05:22 AM    K 4.5 11/29/2022 05:22 AM    CL 97 11/29/2022 05:22 AM    CO2 27 11/29/2022 05:22 AM    ANIONGAP 8 11/29/2022 05:22 AM    BUN 12 11/29/2022 05:22 AM    CREATININE 0.90 11/29/2022 05:22 AM    BUNCRER 13 11/29/2022 05:22 AM    CALCIUM 9.5 11/29/2022 05:22 AM    LABGLOM >60 11/29/2022 05:22 AM    GFRAA >60 09/19/2022 07:38 PM    GFR      09/19/2022 07:38 PM        Radiology:  XR FOOT RIGHT (MIN 3 VIEWS)    Result Date: 11/25/2022  1. Soft tissue ulceration/irregularity of the great toe. 2. Mild degenerative changes as detailed above. Pes planus.  3. No acute fracture or dislocation. 4. Mild plantar calcaneal spur. MRI FOOT RIGHT WO CONTRAST    Result Date: 11/26/2022  1. Soft tissue ulceration at the distal aspect of the 1st toe with underlying cellulitis. No well-defined drainable fluid collection identified. 2. Osteomyelitis of the tuft and shaft of the 1st distal phalanx. Consultations:    Consults:     Final Specialist Recommendations/Findings:   IP CONSULT TO PODIATRY  IP CONSULT TO HOSPITALIST  PHARMACY TO DOSE VANCOMYCIN  IP CONSULT TO PODIATRY  IP CONSULT TO INFECTIOUS DISEASES  IP CONSULT TO SPIRITUAL SERVICES  IP CONSULT TO GENERAL SURGERY  IP CONSULT TO DIETITIAN      The patient was seen and examined on day of discharge and this discharge summary is in conjunction with any daily progress note from day of discharge.     Discharge plan:     Disposition: Home    Physician Follow Up:     [address] [place] [address] [address] [address]  [phone]  Go on 11/30/2022  For wound re-check at 0830 in am    [facility]  Address: [address] [place] [address], [address] [place] [address]    Phone: [phone]  Follow up         Requiring Further Evaluation/Follow Up POST HOSPITALIZATION/Incidental Findings: follow up PCP, wound care, Diabetes education, ID, podiatry, psychiatry    Diet: diabetic diet    Activity: As tolerated    Instructions to Patient: follow up PCP, wound care, Diabetes education, ID, podiatry, psychiatry    Discharge Medications:      Medication List        START taking these medications      insulin lispro (1 Unit Dial) 100 UNIT/ML Sopn  Commonly known as: HumaLOG KwikPen  Inject 10 Units into the skin 3 times daily (before meals)     [employer] Pen Needles 31G 31G X 8 MM Misc  Generic drug: Insulin Pen Needle  1 each by Does not apply route daily     Lantus SoloStar 100 UNIT/ML injection pen  Generic drug: insulin glargine  Inject 25 Units into the skin 2 times daily     levoFLOXacin 500 MG tablet  Commonly known as: Levaquin  Take 1 tablet by mouth daily for 10 days     nicotine 21 MG/24HR  Commonly known as: [address] [facility] 1 patch onto the skin daily  Start taking on: November 30, 2022     OLANZapine 5 MG tablet  Commonly known as: ZYPREXA  Take 1 tablet by mouth nightly     oxyCODONE-acetaminophen 5-325 MG per tablet  Commonly known as: PERCOCET  Take 1 tablet by mouth every 8 hours as needed for Pain for up to 7 days.      rosuvastatin 40 MG tablet  Commonly known as: CRESTOR  Take 1 tablet by mouth nightly            CONTINUE taking these medications      aspirin EC 81 MG EC tablet  Take 1 tablet by mouth daily               Where to Get Your Medications        These medications were sent to [facility] [address], [place] [address]      Phone: [phone]   aspirin EC 81 MG EC tablet  insulin lispro (1 Unit Dial) 100 UNIT/ML Sopn  [employer] Pen Needles 31G 31G X 8 MM Misc  Lantus SoloStar 100 UNIT/ML injection pen  levoFLOXacin 500 MG tablet  nicotine 21 MG/24HR  OLANZapine 5 MG tablet  oxyCODONE-acetaminophen 5-325 MG per tablet  rosuvastatin 40 MG tablet         Discharge Procedure Orders   Alcohol prep pad     [facility] Medication Mgmt (Diabetes - Clinical Pharmacy) - Noa Young   Referral Priority: Routine Referral Type: Consult for Advice and Opinion   Referral Reason: Specialty Services Required   Requested Specialty: Pharmacist   Number of Visits Requested: 1 Expiration Date: 11/29/24     [address] [facility]   Referral Priority: Routine Referral Type: Eval and Treat   Referral Reason: Specialty Services Required   Number of Visits Requested: 1     Yoseph Landaverde MD, Psychiatry, [place]   Referral Priority: Routine Referral Type: Eval and Treat   Referral Reason: Specialty Services Required   Referred to Provider: Erica Sosa Specialty: Psychiatry   Number of Visits Requested: Ishaan Barnhart Shoulder, MD, Infectious Disease, [place]   Referral Priority: Routine Referral Type: Eval and Treat   Referral Reason: Specialty Services Required   Referred to Provider: Blayne Tejeda Requested Specialty: Infectious Diseases   Number of Visits Requested: 1     Mt Hayes DPM, Podiatry, [place]   Referral Priority: Routine Referral Type: Eval and Treat   Referral Reason: Specialty Services Required   Referred to Provider: Astrid Salguero Requested Specialty: Podiatry   Number of Visits Requested: 1       Time Spent on discharge is  36 mins in patient examination, evaluation, counseling as well as medication reconciliation, prescriptions for required medications, discharge plan and follow up. Electronically signed by   Omar Slaughter MD  11/29/2022  10:01 AM      Thank you Dr. Cardona primary care provider on file. for the opportunity to be involved in this patient's care.

## 2022-11-30 ENCOUNTER — TELEPHONE (OUTPATIENT)
Dept: WOUND CARE | Age: 57
End: 2022-11-30

## 2022-11-30 VITALS
DIASTOLIC BLOOD PRESSURE: 76 MMHG | HEART RATE: 94 BPM | SYSTOLIC BLOOD PRESSURE: 119 MMHG | HEIGHT: 75 IN | RESPIRATION RATE: 16 BRPM | OXYGEN SATURATION: 98 % | TEMPERATURE: 97.9 F | BODY MASS INDEX: 21.94 KG/M2 | WEIGHT: 176.44 LBS

## 2022-11-30 LAB
ANION GAP SERPL CALCULATED.3IONS-SCNC: 10 MMOL/L (ref 9–17)
BUN BLDV-MCNC: 11 MG/DL (ref 6–20)
BUN/CREAT BLD: 15 (ref 9–20)
CALCIUM SERPL-MCNC: 9.2 MG/DL (ref 8.6–10.4)
CHLORIDE BLD-SCNC: 97 MMOL/L (ref 98–107)
CO2: 26 MMOL/L (ref 20–31)
CREAT SERPL-MCNC: 0.74 MG/DL (ref 0.7–1.2)
CULTURE: ABNORMAL
CULTURE: NORMAL
CULTURE: NORMAL
DIRECT EXAM: ABNORMAL
DIRECT EXAM: ABNORMAL
GFR SERPL CREATININE-BSD FRML MDRD: >60 ML/MIN/1.73M2
GLUCOSE BLD-MCNC: 241 MG/DL (ref 75–110)
GLUCOSE BLD-MCNC: 261 MG/DL (ref 75–110)
GLUCOSE BLD-MCNC: 314 MG/DL (ref 70–99)
Lab: NORMAL
Lab: NORMAL
POTASSIUM SERPL-SCNC: 4.5 MMOL/L (ref 3.7–5.3)
SEDIMENTATION RATE, ERYTHROCYTE: 64 MM/HR (ref 0–20)
SODIUM BLD-SCNC: 133 MMOL/L (ref 135–144)
SPECIMEN DESCRIPTION: ABNORMAL
SPECIMEN DESCRIPTION: NORMAL
SPECIMEN DESCRIPTION: NORMAL

## 2022-11-30 PROCEDURE — 6370000000 HC RX 637 (ALT 250 FOR IP): Performed by: FAMILY MEDICINE

## 2022-11-30 PROCEDURE — 6370000000 HC RX 637 (ALT 250 FOR IP): Performed by: INTERNAL MEDICINE

## 2022-11-30 PROCEDURE — 97110 THERAPEUTIC EXERCISES: CPT

## 2022-11-30 PROCEDURE — 82947 ASSAY GLUCOSE BLOOD QUANT: CPT

## 2022-11-30 PROCEDURE — 85652 RBC SED RATE AUTOMATED: CPT

## 2022-11-30 PROCEDURE — 97530 THERAPEUTIC ACTIVITIES: CPT

## 2022-11-30 PROCEDURE — 6360000002 HC RX W HCPCS: Performed by: INTERNAL MEDICINE

## 2022-11-30 PROCEDURE — 2580000003 HC RX 258: Performed by: INTERNAL MEDICINE

## 2022-11-30 PROCEDURE — 97535 SELF CARE MNGMENT TRAINING: CPT

## 2022-11-30 PROCEDURE — 97116 GAIT TRAINING THERAPY: CPT

## 2022-11-30 PROCEDURE — 80048 BASIC METABOLIC PNL TOTAL CA: CPT

## 2022-11-30 PROCEDURE — 36415 COLL VENOUS BLD VENIPUNCTURE: CPT

## 2022-11-30 PROCEDURE — 6370000000 HC RX 637 (ALT 250 FOR IP): Performed by: STUDENT IN AN ORGANIZED HEALTH CARE EDUCATION/TRAINING PROGRAM

## 2022-11-30 RX ORDER — OXYCODONE HYDROCHLORIDE AND ACETAMINOPHEN 5; 325 MG/1; MG/1
1 TABLET ORAL EVERY 8 HOURS PRN
Qty: 5 TABLET | Refills: 0 | Status: SHIPPED | OUTPATIENT
Start: 2022-11-30 | End: 2022-12-07

## 2022-11-30 RX ORDER — LEVOFLOXACIN 750 MG/1
750 TABLET ORAL DAILY
Status: DISCONTINUED | OUTPATIENT
Start: 2022-11-30 | End: 2022-11-30 | Stop reason: HOSPADM

## 2022-11-30 RX ADMIN — ASPIRIN 81 MG: 81 TABLET, COATED ORAL at 08:07

## 2022-11-30 RX ADMIN — CEFEPIME 2000 MG: 2 INJECTION, POWDER, FOR SOLUTION INTRAVENOUS at 03:30

## 2022-11-30 RX ADMIN — INSULIN LISPRO 3 UNITS: 100 INJECTION, SOLUTION INTRAVENOUS; SUBCUTANEOUS at 12:22

## 2022-11-30 RX ADMIN — OXYCODONE AND ACETAMINOPHEN 2 TABLET: 325; 5 TABLET ORAL at 03:51

## 2022-11-30 RX ADMIN — ENOXAPARIN SODIUM 40 MG: 100 INJECTION SUBCUTANEOUS at 08:07

## 2022-11-30 RX ADMIN — INSULIN GLARGINE 30 UNITS: 100 INJECTION, SOLUTION SUBCUTANEOUS at 08:40

## 2022-11-30 RX ADMIN — LEVOFLOXACIN 750 MG: 750 TABLET, FILM COATED ORAL at 09:48

## 2022-11-30 RX ADMIN — INSULIN LISPRO 3 UNITS: 100 INJECTION, SOLUTION INTRAVENOUS; SUBCUTANEOUS at 08:41

## 2022-11-30 RX ADMIN — OXYCODONE AND ACETAMINOPHEN 2 TABLET: 325; 5 TABLET ORAL at 08:07

## 2022-11-30 RX ADMIN — FAMOTIDINE 20 MG: 20 TABLET, FILM COATED ORAL at 08:07

## 2022-11-30 RX ADMIN — INSULIN LISPRO 4 UNITS: 100 INJECTION, SOLUTION INTRAVENOUS; SUBCUTANEOUS at 08:40

## 2022-11-30 RX ADMIN — INSULIN LISPRO 8 UNITS: 100 INJECTION, SOLUTION INTRAVENOUS; SUBCUTANEOUS at 12:23

## 2022-11-30 ASSESSMENT — ENCOUNTER SYMPTOMS
SHORTNESS OF BREATH: 0
RESPIRATORY NEGATIVE: 1
ABDOMINAL DISTENTION: 0
DIARRHEA: 0
EYE ITCHING: 0
GASTROINTESTINAL NEGATIVE: 1
EYE REDNESS: 0
COUGH: 0
CONSTIPATION: 0
SINUS PAIN: 0

## 2022-11-30 ASSESSMENT — PAIN SCALES - GENERAL
PAINLEVEL_OUTOF10: 10
PAINLEVEL_OUTOF10: 0

## 2022-11-30 NOTE — CARE COORDINATION
Discharge planning    Patient chart reviewed. Patient discharge held as family felt he would need to go to rehab. Patient has NO INSURANCE unable to go to snf. Will ask social work if any facilities accept pending medicaid. If their are no facilities that accept pending medicaid will need to have discussion again with family regarding this. If they are unable to take him in the their home will need to discuss shelters.      Call to wound care clinic and cancelled this am appointment by leaving VM

## 2022-11-30 NOTE — CARE COORDINATION
Social Work-Contacted patient's legal guardian regarding dc plans. She states that patient will need SNF for rehab and wound care Discussed that he has no insurance listed. She states that he has Medicare and she believes that he also has [facility]. Discussed preference of SNF,. She prefers [facility], [facility], or [facility]. Faxed to all 3 SNF.  Linda Flores

## 2022-11-30 NOTE — PROGRESS NOTES
Physical Therapy  Facility/Department: [facility] PROGRESSIVE CARE  Daily Treatment Note  NAME: Patrick Fraga  : 1965  MRN: [id number]    Date of Service: 2022    Discharge Recommendations:  Patient would benefit from continued therapy after discharge   PT Equipment Recommendations  Equipment Needed: Yes  Mobility Devices: Dony Mckin: Rolling  Pt currently functioning below baseline. Recommend daily inpatient skilled therapy at time of discharge to maximize long term outcomes and prevent re-admission. Please refer to AM-PAC score for current level of function. Patient Diagnosis(es): The primary encounter diagnosis was Hyperglycemia. Diagnoses of Cellulitis of right foot, Hyperlipidemia, unspecified hyperlipidemia type, Schizoaffective disorder, bipolar type ([facility] Subacute osteomyelitis of right foot ([facility] [address] [facility] Type 2 diabetes mellitus with hyperglycemia, with long-term current use of insulin ([facility] [address] [facility] and Homeless single person were also pertinent to this visit. Assessment   Assessment: Patient with progression in ambulation technique, however note poor postural awareness with a flexed forward posture and head down watching affected limb throughout. Patient would benefit from continued skilled PT treatment to maximize return to PLOF>  Activity Tolerance: Patient tolerated treatment well  Equipment Needed: Yes  Mobility Devices: Walker   AM-PAC Score: 18  Plan    Physcial Therapy Plan  General Plan: 5-7 times per week  Current Treatment Recommendations: Transfer training;Gait training;Stair training; Endurance training     Restrictions  Restrictions/Precautions  Restrictions/Precautions: Up as Tolerated, General Precautions, Weight Bearing  Required Braces or Orthoses?: No  Lower Extremity Weight Bearing Restrictions  Right Lower Extremity Weight Bearing: Weight Bearing As Tolerated  Position Activity Restriction  Other position/activity restrictions: Up as tolerated and with assist, WBAT to RLE (patient treats RLE as NWB), telemetry, LUE IV, RUE IV     Subjective    Subjective  Subjective: Patient agreeable for PT treatment. Patient up in chair upon writer's arrival and exit with chair alarm activiated and call light within reach. Orientation  Overall Orientation Status: Within Normal Limits  Orientation Level: Oriented X4  Cognition  Overall Cognitive Status: WNL  Arousal/Alertness: Appropriate responses to stimuli  Following Commands: Follows multistep commands consistently; Follows multistep commands with increased time  Attention Span: Appears intact  Memory: Appears intact  Safety Judgement: Decreased awareness of need for assistance;Decreased awareness of need for safety  Problem Solving: Decreased awareness of errors;Assistance required to identify errors made;Assistance required to generate solutions;Assistance required to implement solutions  Insights: Decreased awareness of deficits  Initiation: Requires cues for some  Sequencing: Does not require cues     Objective   Balance  Sitting: Intact  Standing:  (w/ RW and CGA)  Transfer Training  Transfer Training: Yes  Overall Level of Assistance: Stand-by assistance  Interventions: Safety awareness training;Verbal cues; Visual cues (VCs to keep RW within KAMARI and postural cues with fair carryover.)  Sit to Stand: Stand-by assistance  Stand to Sit: Stand-by assistance  Stand Pivot Transfers: Stand-by assistance  Comment: Patient requires VCs and tactile cues to use arms of chairs to push up from for sit to stand transfer, instead of pulling of RW, to promote safety and correct transfer technique. Gait Training  Gait Training: Yes  Gait  Overall Level of Assistance: Stand-by assistance;Contact-guard assistance  Interventions: Verbal cues; Safety awareness training  Base of Support: Narrowed  Speed/Monica: Slow  Gait Abnormalities: Antalgic; Step to gait (Rt foot wrap with ACE wrap and patient is WBAT with decresed WBing noted on Rt side.)  Distance (ft):  (80' x 1)  Assistive Device: Walker, rolling;Gait belt  Neuromuscular Education  Neuromuscular Education: Yes  Treatment: Gait ;Lower extremity; Sitting;Standing  Comment: VCs for increased posture, relax shoulders down, and to keep RW within KAMARI specifically during turns and transfers. Neuromuscular Comments: VCs req for proper breathing ronda (pursed lip breathing) during functional mobility. Tactile and VCs req for postural control during sit<>stands & amb to promote abdominal and erector spinae mm facilitation for increased stability and balance, decreasing kyphosis of the spine. Pt req VCs to correct for forward WS with squatting in addition to pressing firmly into ground with feet, to promote the appropriate body mechanics for sit<>stand transfers. STS x 5 reps to improve tech, sequencing and correct hand placement. Stood x 2 mins for reaching activity with good-fair balance noted with RW for UB support. PT Exercises  A/AROM Exercises: Patient verbally and physical demo Seated leonardo LE AROM x 20 reps with good understanding and minimal cue of correct technique. Safety Devices  Type of Devices: Gait belt;Call light within reach; All fall risk precautions in place; Chair alarm in place; Left in chair  Restraints  Restraints Initially in Place: No       Goals  Short Term Goals  Time Frame for Short Term Goals: 12 visits  Short Term Goal 1: Patient will be indep with transfers. Short Term Goal 2: Patient will amb 100 feet with RW and indep. Short Term Goal 3: Patient will have good- standng balance. Short Term Goal 4: Patient will tolerate 30 minutes of ther-ex and ther-act. Short Term Goal 5: Patient will negotiate 4 stairs with rail and CGA.   Patient Goals   Patient Goals : none stated    Education  Patient Education  Education Given To: Patient  Education Provided: Plan of Care;Home Exercise Program;Role of Therapy;Transfer Training;Energy Conservation  Education Method: Verbal;Printed Information/Hand-outs  Barriers to Learning: None  Education Outcome: Verbalized understanding;Demonstrated understanding    Therapy Time   Individual Concurrent Group Co-treatment   Time In 1305         Time Out 1346         Minutes [address] [place], [place]

## 2022-11-30 NOTE — PROGRESS NOTES
Pt discharged per [facility] with all belongings. Family called and updated, and informed them of vascular appointment Monday afternoon.

## 2022-11-30 NOTE — PROGRESS NOTES
[facility]  Progress Note    Ariel Shearer  MRN: [id number]  Date: 11/30/2022  LOS: 5     Reason for F/U :   Beta-hemolytic strep right foot infection    Impression :   Right lower extremity peripheral arterial disease  Right hallux ulceration cellulitis  Wound cultures positive for beta-hemolytic Streptococcus  Distal right hallux osteomyelitis  Poorly controlled diabetes mellitus type 2  Hemoglobin A1c of 18.3 on 11/26/2022  Ventral abdominal wall herniation  Schizoaffective disorder    Recommendations:   Wound cultures from 11/25/2022 show beta-hemolytic strep group B streptococcus  Healing is complicated by peripheral arterial disease with right ROMARIO of 0.71 and left ROMARIO of 0.99  The patient continues on IV cefepime  He is okay to transition to oral levofloxacin at the time of discharge for the streptococcal species  Continue oral levofloxacin for 2 weeks pending podiatry plans for outpatient amputation of the right hallux  Podiatry recommends outpatient partial surgical amputation of the right hallux, date unknown, and continue to follow-up with wound culture  Blood cultures 11/25/2022 remain no growth  Continue daily dressing changes of the right foot  He is okay for discharge from an infectious disease standpoint, discharge was held yesterday by family as they feel he should go to a rehab facility, this is complicated as he has no insurance and he has pending Medicaid    Infection Control Recommendations:   Universal precautions    Discharge Planning:   Estimated Length of IV antimicrobials:  While hospitalized  Patient will need Midline Catheter Insertion/ PICC line Insertion: No  Patient will need: Home IV , [place],  SNF,  LTAC: Undetermined  Patient willneed outpatient wound care: No    Medical Decision making / Summary of Stay:   Ariel Shearer is a 56y.o.-year-old male presented to hospital with right foot pain worsening over 2 weeks associated with open wound to the right hallux purulent drainage and redness that is extending to the foot dorsum. Symptoms severe, no alleviating or aggravating factors. He is afebrile. The patient had history of uncontrolled diabetes, last HbA1c 18. 3. He was noted to have ventral hernia, surgery has been consulted. He denied abdominal pain, no nausea or vomiting, no diarrhea, no cough or shortness of breath, no other complaints. Initial WBC was 16.3, glucose 691, creatinine 1, C-reactive protein 196, sedimentation rate 71  Wound swab showed few gram-positive cocci in pairs on gram stain ,pending cultures. Initial lactic acid was 1.5     2022:  He is feeling better, right foot pain better controlled, denied fever or chills, denied nausea or vomiting, no other complaints. Current evaluation:2022    /86   Pulse 78   Temp 98.4 °F (36.9 °C) (Oral)   Resp 16   Ht 6' 3\" (1.905 m)   Wt 176 lb 7 oz (80 kg)   SpO2 98%   BMI 22.05 kg/m²     Temperature Range: Temp: 98.4 °F (36.9 °C) Temp  Av.6 °F (37 °C)  Min: 98 °F (36.7 °C)  Max: 99.1 °F (37.3 °C)    The patient was seen and evaluated sitting up in bed, nursing staff is at bedside  Patient states he feels well, denies any complaints at this time    Review of Systems   Constitutional: Negative. HENT: Negative. Respiratory: Negative. Cardiovascular: Negative. Gastrointestinal: Negative. Genitourinary: Negative. Musculoskeletal: Negative. Skin: Negative. Neurological: Negative. Psychiatric/Behavioral: Negative. Physical Examination :     Physical Exam  Constitutional:       Appearance: Normal appearance. He is normal weight. HENT:      Head: Normocephalic and atraumatic. Pulmonary:      Effort: Pulmonary effort is normal. No respiratory distress. Musculoskeletal:         General: Normal range of motion. Comments: Right foot with dressing in place, not removed. Skin:     General: Skin is warm and dry.    Neurological: General: No focal deficit present. Mental Status: He is alert and oriented to person, place, and time. Mental status is at baseline. Psychiatric:         Mood and Affect: Mood normal.       Laboratory data:   I have independently reviewed the followinglabs:  CBC with Differential:   Recent Labs     11/28/22  0545   WBC 7.7   HGB 13.6   HCT 40.9        BMP:   Recent Labs     11/29/22  0522 11/30/22  0510   * 133*   K 4.5 4.5   CL 97* 97*   CO2 27 26   BUN 12 11   CREATININE 0.90 0.74     Hepatic Function Panel: No results for input(s): PROT, LABALBU, BILIDIR, IBILI, BILITOT, ALKPHOS, ALT, AST in the last 72 hours. Lab Results   Component Value Date/Time    PROCAL 0.17 11/28/2022 05:45 AM    PROCAL 0.52 11/26/2022 06:02 AM     Lab Results   Component Value Date/Time    CRP 58.6 11/29/2022 05:22 AM    CRP 71.7 11/28/2022 05:45 AM    .9 11/26/2022 06:02 AM     Lab Results   Component Value Date    SEDRATE 64 (H) 11/30/2022         No results found for: DDIMER  No results found for: FERRITIN  No results found for: LDH  No results found for: FIBRINOGEN    No results found for requested labs within last 30 days. No results found for: COVID19    No results for input(s): VANCOTROUGH in the last 72 hours. Imaging Studies:   No new imaging    Cultures:     Culture, Anaerobic and Aerobic [[id number]] (Abnormal) Collected: 11/25/22 1621   Order Status: Completed Specimen: Foot Updated: 11/30/22 0782    Specimen Description . FOOT    Direct Exam RARE NEUTROPHILS Abnormal      FEW GRAM POSITIVE COCCI IN PAIRS Abnormal     Culture STREPTOCOCCI, BETA HEMOLYTIC GROUP B HEAVY GROWTH Abnormal      No anaerobic organisms isolated at 5 days. Culture, Blood 1 [[id number]] Collected: 11/25/22 1455   Order Status: Completed Specimen: Blood Updated: 11/29/22 1711    Specimen Description . BLOOD    Special Requests RFA    Culture NO GROWTH 4 DAYS   Culture, Blood 2 [[id number]] Collected: 11/25/22 1510 Order Status: Completed Specimen: Blood Updated: 11/29/22 1711    Specimen Description . BLOOD    Special Requests L FOREARM UNK MLS    Culture NO GROWTH 4 DAYS       Medications:      insulin glargine  30 Units SubCUTAneous BID    insulin lispro  3 Units SubCUTAneous TID WC    nicotine  1 patch TransDERmal Daily    OLANZapine  5 mg Oral Nightly    rosuvastatin  40 mg Oral Nightly    sodium chloride flush  5-40 mL IntraVENous 2 times per day    aspirin EC  81 mg Oral Daily    sodium chloride flush  5-40 mL IntraVENous 2 times per day    enoxaparin  40 mg SubCUTAneous Daily    famotidine  20 mg Oral BID    cefepime  2,000 mg IntraVENous Q8H    insulin lispro  0-16 Units SubCUTAneous TID WC    insulin lispro  0-4 Units SubCUTAneous Nightly           [facility]  CATALINO Gay CNP  Perfect [employer] messaging  OFFICE: [phone]      Electronically signed by CATALINO Gay CNP on 11/30/2022 at 7:52 AM  Thank you for allowing us to participate in the care of this patient. Please call with questions. This note iscreated with the assistance of a speech recognition program.  While intending to generate a document that actually reflects the content of the visit, the document can still have some errors including those of syntax andsound a like substitutions which may escape proof reading. In such instances, actual meaning can be extrapolated by contextual diversion.

## 2022-11-30 NOTE — CARE COORDINATION
Social Work- Patient's sister brought in his insurance card. Patient  has Medicare under the name Sandoval Lucero. Sister states that the name on his birth certificate was Darline Jay, but he went by Enedina Skinner when his mother remarried. [facility] can accept. Sister would also like a referral to [facility].  Sent referral. Richmond Aguirre

## 2022-11-30 NOTE — PROGRESS NOTES
Occupational Therapy  Facility/Department: D PROGRESSIVE CARE  Rehabilitation Occupational Therapy Daily Treatment Note    Date: 22  Patient Name: Dirk Marmolejo       Room: [address] [place], [place] [address]  MRN: [id number]  Account: [de-identified]   : 1965  (56 y.o.) Gender: male      RN Marcello Suggs reports patient is medically stable for therapy treatment this date. Chart reviewed prior to treatment and patient is agreeable for therapy. All lines intact and patient positioned comfortably at end of treatment. All patient needs addressed prior to ending therapy session. Due to recent hospitalization and medical condition, pt would benefit from additional intermittent skilled therapy at time of discharge. Please refer to the AM-PAC score for current functional status. Past Medical History:  has a past medical history of Acute encephalopathy, Acute kidney injury ([address] Homelessness, Hyperglycemia, Ketonemia, Lactic acidosis, Noncompliance, Schizoaffective disorder ([address] Tobacco abuse, Type 2 diabetes mellitus with hyperosmolar nonketotic hyperglycemia ([address] Ventral hernia, Wound of abdomen, and Wound of left ankle. Past Surgical History:   has a past surgical history that includes Arm Surgery. Restrictions  Restrictions/Precautions: Up as Tolerated;General Precautions;Weight Bearing  Other position/activity restrictions: Up as tolerated and with assist, WBAT to RLE (patient treats RLE as NWB), telemetry, LUE IV, RUE IV  Right Lower Extremity Weight Bearing: Weight Bearing As Tolerated  Required Braces or Orthoses?: No    Subjective  Subjective: Pt resting in bed agreeable to therapy. 8/10 pain in R toe pt reported he was given meds shortly prior to writer's arrival  Restrictions/Precautions: Up as Tolerated;General Precautions;Weight Bearing             Objective     Cognition  Overall Cognitive Status: WNL  Arousal/Alertness: Appropriate responses to stimuli  Following Commands:  Follows multistep commands consistently; Follows multistep commands with increased time  Attention Span: Appears intact  Memory: Appears intact  Safety Judgement: Decreased awareness of need for assistance;Decreased awareness of need for safety  Problem Solving: Decreased awareness of errors;Assistance required to identify errors made;Assistance required to generate solutions;Assistance required to implement solutions  Insights: Decreased awareness of deficits  Initiation: Requires cues for some  Sequencing: Does not require cues  Orientation  Overall Orientation Status: Within Normal Limits  Orientation Level: Oriented X4         ADL  Grooming/Oral Hygiene  Assistance Level: Set-up; Contact guard assist;Stand by assist  Skilled Clinical Factors: Standing at sink w/RW to wash face, comb hair, and brush teeth/use mouth wash          Functional Mobility  Device: Rolling walker  Activity: To/From bathroom  Assistance Level: Contact guard assist  Skilled Clinical Factors: Pt went between hopping and on L foot and walking bearing weight through R heel. Min VC's for slow/controlled movement, upright posture, pacing self, pursed lip breathing, and RW safety. Bed Mobility  Overall Assistance Level: Independent  Additional Factors: Head of bed raised  Supine to Sit  Assistance Level: Independent  Scooting  Assistance Level: Independent  Transfers  Additional Factors: Hand placement cues; Verbal cues  Device: Walker  Sit to Stand  Assistance Level: Contact guard assist;Stand by assist  Skilled Clinical Factors: Min vc's for use of RW, RW safety, pushing up from surface when with hands when standing, squaring self/AD and reaching back prior to sitting, upright posture, controlled sit/stand, pacing, and overall safety.   Stand to Sit  Assistance Level: Contact guard assist;Stand by assist   OT Exercises  Exercise Treatment: While seated in chair pt compelted UB theraband exercises with blue band for 1 set of 10 reps in all planes to increase UB strength/endurance for increased IND and ease with functional mobility and ADL transfers. Pt also completed x10 chair push ups to increase quality and ease of STSs. Assessment  Assessment  Assessment: Pt tolerated session well maintaining progress and continuing to progress toward goals. Skilled OT services are indicated at this time to maximize this pt's safety and IND with self care tasks and to facilitate safe return to PLOF as able. Activity Tolerance: Patient tolerated treatment well  Discharge Recommendations: Patient would benefit from continued therapy after discharge  OT Equipment Recommendations  Equipment Needed: Yes  Mobility Devices: Concetta Ranr: Rolling  ADL Assistive Devices: Shower Chair with back  [address] in place: Yes  Type of devices: Gait belt;Left in chair;Chair alarm in place;Nurse notified;Call light within reach; All fall risk precautions in place    Patient Education  Education  Education Given To: Patient  Education Provided: Role of Therapy; ADL Function;Mobility Training;Precautions; Safety;Plan of Care;Home Exercise Program;Transfer Training  Education Method: Verbal  Education Outcome: Verbalized understanding;Continued education needed    Plan  Occupational Therapy Plan  Times Per Week: 4-5x/week 1-2x/day as tolerated  Current Treatment Recommendations: Balance training;Functional mobility training; Endurance training; Safety education & training;Pain management;Patient/Caregiver education & training;Self-Care / ADL; Home management training;Equipment evaluation, education, & procurement;Positioning;Cognitive/Perceptual training    Goals  Patient Goals   Patient goals : To decrease pain! Short Term Goals  Time Frame for Short Term Goals: By discharge, pt to demo:  Short Term Goal 1: bed mobility tasks with MOD I/IND and use of bedrails as needed.   Short Term Goal 2: ADL transfers and functional mobility tasks with SUP and Good safety with use of AD as needed. Short Term Goal 3: LB ADLs to SUP and Good safety with use of AE/compensatory strategies/AD as needed. Short Term Goal 4: IND with a BUE HEP, with use of handouts as needed, in order to maintain current strength/ROM required for safety and IND with self care tasks. Short Term Goal 5: Pt/family to be IND with WB precautions, fall prevention strategies, EC/WS tech, compensatory ADL strategies, and discharge/equipment recommendations with use of handouts as needed.     AM-PAC Score        AM-[facility] Inpatient Daily Activity Raw Score: 20 (11/30/22 0831)  AM-PAC Inpatient ADL T-Scale Score : 42.03 (11/30/22 0831)  ADL Inpatient CMS 0-100% Score: 38.32 (11/30/22 0831)  ADL Inpatient CMS G-Code Modifier : CJ (11/30/22 0831)      Therapy Time   Individual Concurrent Group Co-treatment   Time In 0821         Time Out 0902         Minutes [address] BRANDY Ontiveros

## 2022-11-30 NOTE — PROGRESS NOTES
Security dropped off patient belongings - backpack and bucket. Call to sister Lalo Garcia, she will  his bike from security tomorrow.

## 2022-11-30 NOTE — PROGRESS NOTES
Progress Note  Podiatric Medicine and Surgery     Subjective     CC: Right hallux wound with pain     Pending placement. Patient removed dressing and applied OTC corn and callus remover to his wound  Working with OT during evaluation    HPI :  Leanna Lanza is a 49 y.o. male seen at [address] [place] [address] ED by podiatry for a right hallux with associated pain. Patient states that he has been dealing with the right hallux wound for around 2 weeks and just recently developed pain and redness. He says that he has noticed increase drainage from the area and became increasing concerned for infection. He denies any systemic signs of infection including nausea, vomiting, fever or chills. He is a diabetic patient with blood sugars currently 691. Says that he does not take his medication. PCP is Chioma Lucas MD    ROS: Denies N/V/F/C/SOB/CP. Otherwise negative except at stated in the HPI.      Medications:  Scheduled Meds:   levoFLOXacin  750 mg Oral Daily    insulin glargine  30 Units SubCUTAneous BID    insulin lispro  3 Units SubCUTAneous TID WC    nicotine  1 patch TransDERmal Daily    OLANZapine  5 mg Oral Nightly    rosuvastatin  40 mg Oral Nightly    sodium chloride flush  5-40 mL IntraVENous 2 times per day    aspirin EC  81 mg Oral Daily    sodium chloride flush  5-40 mL IntraVENous 2 times per day    enoxaparin  40 mg SubCUTAneous Daily    famotidine  20 mg Oral BID    insulin lispro  0-16 Units SubCUTAneous TID WC    insulin lispro  0-4 Units SubCUTAneous Nightly       Continuous Infusions:   dextrose      sodium chloride      sodium chloride 10 mL/hr at 11/27/22 0523       PRN Meds:glucose, dextrose bolus **OR** dextrose bolus, glucagon (rDNA), dextrose, sodium chloride flush, sodium chloride, sodium chloride flush, sodium chloride, acetaminophen **OR** acetaminophen, morphine **OR** morphine, oxyCODONE-acetaminophen **OR** oxyCODONE-acetaminophen    Objective     Vitals:  Patient Vitals for the past 8 hrs: BP Temp Temp src Pulse Resp SpO2 Weight   22 0745 120/86 98.4 °F (36.9 °C) Oral 78 16 98 % --   22 0602 -- -- -- -- -- -- 176 lb 7 oz (80 kg)   22 0421 -- -- -- -- 16 -- --       Average, Min, and Max for last 24 hours Vitals:  TEMPERATURE:  Temp  Av.6 °F (37 °C)  Min: 98 °F (36.7 °C)  Max: 99.1 °F (37.3 °C)    RESPIRATIONS RANGE: Resp  Avg: 15.7  Min: 14  Max: 16    PULSE RANGE: Pulse  Av.8  Min: 78  Max: 115    BLOOD PRESSURE RANGE:  Systolic (01CKL), RCP:992 , Min:120 , QXO:800   ; Diastolic (17NPM), MCS:45, Min:82, Max:97      PULSE OXIMETRY RANGE: SpO2  Av.7 %  Min: 93 %  Max: 98 %    I/O last 3 completed shifts:  In: -   Out: 4401 [Urine:4400; Stool:1]    CBC:  Recent Labs     2245 22   WBC 7.7  --    HGB 13.6  --    HCT 40.9  --      --    CRP 71.7* 58.6*          BMP:  Recent Labs     2245 22  0510    132* 133*   K 3.8 4.5 4.5    97* 97*   CO2 24 27 26   BUN 9 12 11   CREATININE 0.67* 0.90 0.74   GLUCOSE 201* 407* 314*   CALCIUM 9.3 9.5 9.2          Coags:  No results for input(s): APTT, PROT, INR in the last 72 hours. Lab Results   Component Value Date    SEDRATE 64 (H) 2022     Recent Labs     2245 22   CRP 71.7* 58.6*         Lower Extremity Physical Exam:  Vascular: DP and PT pulses are lightly palpable. CFT <3 seconds to all digits. Hair growth is absent to the level of the digits. Edema noted to right foot. Neuro: Saph/sural/SP/DP/plantar sensation diminished to light touch. Musculoskeletal: Muscle strength is 4/5 to all lower extremity muscle groups. Gross deformity is absent. Dermatologic:  Erythema with increased warmth noted to dorsal aspect of patients right foot. Full thickness ulcer #1 located to the distal aspect of the right hallux. Wound today has a dark eschar. No purulent drainage expressed with no associated mal odor.  Erythema present with associated increase in warmth. Cannot assess wound depth today due to the dark eschar. No fluctuance, crepitus, or induration. Interdigital maceration absent. Clinical Images:              Imaging:   MRI FOOT RIGHT WO CONTRAST   Final Result   1. Soft tissue ulceration at the distal aspect of the 1st toe with underlying   cellulitis. No well-defined drainable fluid collection identified. 2. Osteomyelitis of the tuft and shaft of the 1st distal phalanx. VL LOWER EXTREMITY ARTERIAL SEGMENTAL PRESSURES W PPG   Final Result      XR FOOT RIGHT (MIN 3 VIEWS)   Final Result   1. Soft tissue ulceration/irregularity of the great toe. 2. Mild degenerative changes as detailed above. Pes planus. 3. No acute fracture or dislocation. 4. Mild plantar calcaneal spur. Cultures:   Taken at bedside in the ED. final results pending    Assessment   Radha Gonzalez is a 56 y.o. male with   Diabetic Ulcer down to the level of bone, right hallux  Osteomyelitis, right hallux  Cellulitis, right foot  Uncontrolled type II DM with peripheral neuropathy. Principal Problem:    Sepsis ([address]  Active Problems:    Cellulitis of right foot    Osteomyelitis of right foot ([address]    Ventral hernia without obstruction or gangrene    Hyperglycemia    Homeless single person    Type 2 diabetes mellitus with hyperglycemia, with long-term current use of insulin ([address]    Uncontrolled type 2 diabetes mellitus with hyperglycemia, without long-term current use of insulin (HCC)    Schizoaffective disorder ([address]    Tobacco abuse  Resolved Problems:    * No resolved hospital problems. *       Plan     Patient examined and evaluated at bedside     Dressing applied to right foot: Betadine wet to dry, Kerlix and ace badnage  Patient is currently receiving Cefepime and Vanco. Order placed for ID for antibiotic management.  Appreciate recommendations  Noninvasive vascular studies show the following:  Right ROMARIO: 0.75  Left ROMARIO: 0.99  MRI reviewed: Osteomyelitis to the distal phalanx of the right hallux  Surgery will be scheduled outpatient per attending. Patient will need to follow up wound care ASAP after discharge. Perform dressing change daily or at least every other day. Patient does not have insurance or PCP. Pending SNF placement per CM. Given patient's diminished blood flow on RLE. Patient will need to follow up with vascular as outpatient as well.    WBAT to Right lower extremity  Discussed with CHONG NeriM   Podiatric Medicine & Surgery   11/30/2022 at 10:21 AM

## 2022-11-30 NOTE — PROGRESS NOTES
[facility]  Office: [address], DO, Jessica Lentz, DO, Molly Dinh, DO, Chloé Munoz Blood, DO, Zain Morales MD, Sophia Chapa MD, Aj Sanchez MD, Collins Yanez MD,  Estevan Bryan MD, Nehemias Abebe MD, Dallas Dent, DO, Liban Walton MD,  Franco Jon MD, Aminata Shoemaker MD, Inocente Soulier, DO, Xavier Lima MD, Terri Rogers MD, Kayleen Banks DO, Ioana Herman MD, Javid Figueredo MD, Bryan Eller MD, Nel Lopez MD, Alec Johnson, DO, Jamal Miguel MD, Nino Lehman MD, Jason Bryan, Cat Amor CNP, Todd Simmons, CNP, Serenity Jerry, CNP,  Steven Mcadams, [facility], Desiree Bautista, CNP, Marcelo Nair, CNP, Cirilo Sanchez, CNP, Vero Vogt, CNP, Jeyson Torres, CNP, Kelby Marcelo PA-C, Marta Painting, CNS, Mauricio Nunez, [facility], Surjit Sawyer, CNP, Emily Souza, CNP, Taylor Hernandez, [facility]    Progress Note    11/30/2022    9:31 AM    Name:   Phyllis Garcia  MRN:     [id number]     Acct:      [de-identified]   Room:   [address] [place] [address] Day:  5  Admit Date:  11/25/2022  1:14 PM    PCP:   No primary care provider on file. Code Status:  Full Code    Subjective:     C/C:   Chief Complaint   Patient presents with    Toe Pain     \"Think I hit it on something a week or 2 ago\" hx diabetes and not taking prescribed metformin      Interval History Status: not changed. Patietn seen and examined. Feeling well. Wants to go home. Brief History:     81-year-old male past medical history of schizoaffective disorder, diabetes type 2, ventral hernia, peripheral neuropathy, noncompliant presents to the ER with right foot pain after hitting something on his foot a few weeks prior. Patient seen and evaluated by podiatry, surgery, infectious disease. Currently on vancomycin and cefepime for osteomyelitis to be changed to levaquin for 2 weeks.  And to follow up with podiatry and wound care as outpatient. Patient currently applying for medicaid and awaiting SNF placement if possible    Review of Systems:     Review of Systems   Constitutional:  Positive for fatigue. Negative for activity change and fever. HENT:  Negative for congestion, sinus pain and sneezing. Eyes:  Negative for redness and itching. Respiratory:  Negative for cough and shortness of breath. Cardiovascular:  Negative for chest pain, palpitations and leg swelling. Gastrointestinal:  Negative for abdominal distention, constipation and diarrhea. Endocrine: Negative for cold intolerance and heat intolerance. Genitourinary:  Negative for difficulty urinating and dysuria. Musculoskeletal:  Negative for arthralgias and myalgias. Skin:  Positive for wound. Neurological:  Negative for dizziness, syncope and weakness. Psychiatric/Behavioral:  Negative for agitation and behavioral problems. Medications:      Allergies:  No Known Allergies    Current Meds:   Scheduled Meds:    levoFLOXacin  750 mg Oral Daily    insulin glargine  30 Units SubCUTAneous BID    insulin lispro  3 Units SubCUTAneous TID WC    nicotine  1 patch TransDERmal Daily    OLANZapine  5 mg Oral Nightly    rosuvastatin  40 mg Oral Nightly    sodium chloride flush  5-40 mL IntraVENous 2 times per day    aspirin EC  81 mg Oral Daily    sodium chloride flush  5-40 mL IntraVENous 2 times per day    enoxaparin  40 mg SubCUTAneous Daily    famotidine  20 mg Oral BID    insulin lispro  0-16 Units SubCUTAneous TID WC    insulin lispro  0-4 Units SubCUTAneous Nightly     Continuous Infusions:    dextrose      sodium chloride      sodium chloride 10 mL/hr at 11/27/22 0523     PRN Meds: glucose, dextrose bolus **OR** dextrose bolus, glucagon (rDNA), dextrose, sodium chloride flush, sodium chloride, sodium chloride flush, sodium chloride, acetaminophen **OR** acetaminophen, morphine **OR** morphine, oxyCODONE-acetaminophen **OR** oxyCODONE-acetaminophen    Data:     Past Medical History:   has a past medical history of Acute encephalopathy, Acute kidney injury ([facility] Homelessness, Hyperglycemia, Ketonemia, Lactic acidosis, Noncompliance, Schizoaffective disorder ([facility] Tobacco abuse, Type 2 diabetes mellitus with hyperosmolar nonketotic hyperglycemia ([facility] Ventral hernia, Wound of abdomen, and Wound of left ankle. Social History:   reports that he has been smoking cigarettes. He has been smoking an average of 1 pack per day. He has never used smokeless tobacco. He reports that he does not drink alcohol and does not use drugs. Family History:   Family History   Problem Relation Age of Onset    Cancer Mother     Cancer Father        Vitals:  /86   Pulse 78   Temp 98.4 °F (36.9 °C) (Oral)   Resp 16   Ht 6' 3\" (1.905 m)   Wt 176 lb 7 oz (80 kg)   SpO2 98%   BMI 22.05 kg/m²   Temp (24hrs), Av.6 °F (37 °C), Min:98 °F (36.7 °C), Max:99.1 °F (37.3 °C)    Recent Labs     22  1152 22  1646 22  1954 22  0807   POCGLU 194* 219* 193* 241*         I/O (24Hr):     Intake/Output Summary (Last 24 hours) at 2022 0931  Last data filed at 2022 0339  Gross per 24 hour   Intake --   Output 1750 ml   Net -1750 ml         Labs:  Hematology:  Recent Labs     22  0545 22  0510   WBC 7.7  --   --    RBC 4.57  --   --    HGB 13.6  --   --    HCT 40.9  --   --    MCV 89.5  --   --    MCH 29.8  --   --    MCHC 33.3  --   --    RDW 11.7*  --   --      --   --    MPV 10.1  --   --    SEDRATE 70* 81* 64*   CRP 71.7* 58.6*  --        Chemistry:  Recent Labs     22  0545 22  0522 22  0510    132* 133*   K 3.8 4.5 4.5    97* 97*   CO2 24 27 26   GLUCOSE 201* 407* 314*   BUN 9 12 11   CREATININE 0.67* 0.90 0.74   ANIONGAP 11 8* 10   LABGLOM >60 >60 >60   CALCIUM 9.3 9.5 9.2       Recent Labs     22  1936 22  0712 22  1152 22  1646 11/29/22 1954 11/30/22  0807   POCGLU 253* 300* 194* 219* 193* 241*       ABG:  Lab Results   Component Value Date/Time    FIO2 NOT REPORTED 12/24/2016 02:47 AM     Lab Results   Component Value Date/Time    SPECIAL L FOREARM UNK MLS 11/25/2022 03:10 PM     Lab Results   Component Value Date/Time    CULTURE STREPTOCOCCI, BETA HEMOLYTIC GROUP B HEAVY GROWTH (A) 11/25/2022 04:21 PM    CULTURE NORMAL SKIN ANALILIA 11/25/2022 04:21 PM    CULTURE No anaerobic organisms isolated at 5 days. 11/25/2022 04:21 PM       Radiology:  XR FOOT RIGHT (MIN 3 VIEWS)    Result Date: 11/25/2022  1. Soft tissue ulceration/irregularity of the great toe. 2. Mild degenerative changes as detailed above. Pes planus. 3. No acute fracture or dislocation. 4. Mild plantar calcaneal spur. MRI FOOT RIGHT WO CONTRAST    Result Date: 11/26/2022  1. Soft tissue ulceration at the distal aspect of the 1st toe with underlying cellulitis. No well-defined drainable fluid collection identified. 2. Osteomyelitis of the tuft and shaft of the 1st distal phalanx. Physical Examination:        Physical Exam  Constitutional:       Appearance: He is not ill-appearing or diaphoretic. HENT:      Head: Normocephalic and atraumatic. Nose: Nose normal.      Mouth/Throat:      Mouth: Mucous membranes are moist.   Eyes:      Pupils: Pupils are equal, round, and reactive to light. Cardiovascular:      Rate and Rhythm: Normal rate and regular rhythm. Pulses: Normal pulses. Heart sounds: No murmur heard. Pulmonary:      Effort: Pulmonary effort is normal. No respiratory distress. Breath sounds: No wheezing. Abdominal:      General: Bowel sounds are normal. There is no distension. Palpations: Abdomen is soft. Musculoskeletal:      Cervical back: Neck supple. Right lower leg: No edema. Left lower leg: No edema. Skin:     General: Skin is warm and dry. Capillary Refill: Capillary refill takes less than 2 seconds. Comments: Right lower extremity wound dressed    Neurological:      General: No focal deficit present. Mental Status: He is alert and oriented to person, place, and time.    Psychiatric:         Mood and Affect: Mood normal.         Behavior: Behavior normal.       Assessment:        Hospital Problems             Last Modified POA    * (Principal) Sepsis ([address] 11/25/2022 Yes    Cellulitis of right foot 11/25/2022 Yes    Osteomyelitis of right foot ([address] 11/27/2022 Yes    Ventral hernia without obstruction or gangrene 11/25/2022 Yes    Hyperglycemia 11/25/2022 Yes    Homeless single person 11/26/2022 Yes    Type 2 diabetes mellitus with hyperglycemia, with long-term current use of insulin ([address] 11/25/2022 Yes    Uncontrolled type 2 diabetes mellitus with hyperglycemia, without long-term current use of insulin ([address] 11/26/2022 Yes    Schizoaffective disorder ([address] 11/26/2022 Yes    Tobacco abuse 11/25/2022 Yes     Plan:        Continue levaquin  Insulin for daibetes  Nicoderm patch  Wound care while admitted  Transfer to med surg  Discharge planning     Ирина Cristobal MD  11/30/2022  9:31 AM

## 2022-11-30 NOTE — CARE COORDINATION
Social Work-Spoke with patient's sister. She would prefer [facility]. [facility] can admit today. Requested [facility] transport.  Anthony Beyer

## 2022-11-30 NOTE — CARE COORDINATION
Discharge planning    Sahara Palafox called from [facility] and she just applied for [facility] yesterday.  His application number  is [id number]

## 2022-12-13 NOTE — DISCHARGE INSTRUCTIONS
[address] [facility],5Th Floor -Phone: [phone] Fax: [phone]   Visit  Discharge Instructions / Physician Orders    DATE: 12/14/2022     Home Care:      SUPPLIES ORDERED THRU:      Wound Location: Right Great Toe     Cleanse with: Liquid antibacterial soap and water, rinse well      Dressing Orders: Betadine soaked gauze, dry dressing, Post Op Shoe     Frequency: Daily     Additional Orders: Increase protein to diet (meat, cheese, eggs, fish, peanut butter, nuts and beans)  Multivitamin daily    Your next appointment with [address] [place] [address] is in 1 week with Dr. Gregory Dalal     (Please note your next appointment above and if you are unable to keep, kindly give a 24 hour notice. Thank you.)  If more than 15 min late we cannot guarantee you will be seen due to clinician schedule  Per Policy, Excessive cancellation will call for dismissal from program.     If you experience any of the following, please call the [address] [place] [address] during business hours:  [phone]  Your Phone call may be forwarded to [employer] during business hours that [facility] is closed. * Increase in Pain  * Temperature over 101  * Increase in drainage from your wound  * Drainage with a foul odor  * Bleeding  * Increase in swelling  * Need for compression bandage changes due to slippage, breakthrough drainage. If you need medical attention outside of the business hours of the [address] [place] [address] please contact your PCP or go to the nearest emergency room. The information contained in the After Visit Summary has been reviewed with me, the patient and/or responsible adult, by my health care provider(s). I had the opportunity to ask questions regarding this information.  I have elected to receive;      []After Visit Summary  [x]Comprehensive Discharge Instruction      Patient signature______________________________________Date:________  Electronically signed by Bonnie Nicole RN on 12/14/2022 at 11:02 AM  Electronically signed by Faye John DPM on 12/14/2022 at 10:28 AM

## 2022-12-14 ENCOUNTER — HOSPITAL ENCOUNTER (OUTPATIENT)
Dept: WOUND CARE | Age: 57
Discharge: HOME | End: 2022-12-14
Payer: MEDICAID

## 2022-12-14 VITALS
SYSTOLIC BLOOD PRESSURE: 144 MMHG | HEART RATE: 110 BPM | TEMPERATURE: 97.9 F | RESPIRATION RATE: 18 BRPM | DIASTOLIC BLOOD PRESSURE: 79 MMHG

## 2022-12-14 DIAGNOSIS — I70.261 ATHEROSCLEROSIS OF NATIVE ARTERY OF RIGHT LEG WITH GANGRENE (HCC): ICD-10-CM

## 2022-12-14 DIAGNOSIS — M86.9 OSTEOMYELITIS OF GREAT TOE OF RIGHT FOOT (HCC): ICD-10-CM

## 2022-12-14 PROCEDURE — 99213 OFFICE O/P EST LOW 20 MIN: CPT

## 2022-12-14 RX ORDER — ATORVASTATIN CALCIUM 80 MG/1
80 TABLET, FILM COATED ORAL DAILY
COMMUNITY

## 2022-12-14 RX ORDER — HYDROCODONE BITARTRATE AND ACETAMINOPHEN 5; 325 MG/1; MG/1
1 TABLET ORAL EVERY 6 HOURS PRN
COMMUNITY

## 2022-12-14 ASSESSMENT — PAIN DESCRIPTION - LOCATION: LOCATION: FOOT

## 2022-12-14 ASSESSMENT — PAIN DESCRIPTION - ORIENTATION: ORIENTATION: RIGHT

## 2022-12-14 ASSESSMENT — PAIN SCALES - GENERAL: PAINLEVEL_OUTOF10: 7

## 2022-12-14 ASSESSMENT — PAIN DESCRIPTION - DESCRIPTORS: DESCRIPTORS: TENDER

## 2022-12-14 NOTE — PROGRESS NOTES
[address]   Progress Note and Procedure Note      Radha Gonzalez  MEDICAL RECORD NUMBER:  [id number]  AGE: 56 y.o. GENDER: male  : 1965  EPISODE DATE:  2022    Subjective:     Chief Complaint   Patient presents with    Wound Check     Right foot         HISTORY of PRESENT ILLNESS HPI     Radha Gonzalez is a 56 y.o. male who presents today for wound/ulcer evaluation. History of Wound Context: Luciana Knox presents with a representative from the nursing facility where he currently resides for evaluation of right great toe ulceration and gangrene. He was recently admitted to [facility] where he was seen by podiatry and infectious disease. He also underwent noninvasive vascular testing during admission and was found to have moderate arterial occlusive disease of the right lower leg. The plan was to have outpatient amputation with the podiatrist.  He has not seen this podiatrist and no appointment has been made. He continues on IV Levaquin per ID. Has not had any worsening sign or symptom of infection.     Ulcer Identification:  Ulcer Type: arterial and diabetic  Contributing Factors: diabetes, poor glucose control, smoking, arterial insufficiency, decreased tissue oxygenation, and substance abuse          PAST MEDICAL HISTORY        Diagnosis Date    Acute encephalopathy     Acute kidney injury ([address]     Homelessness     Hyperglycemia     Ketonemia     Lactic acidosis     Noncompliance     Schizoaffective disorder (HCC)     Tobacco abuse     Type 2 diabetes mellitus with hyperosmolar nonketotic hyperglycemia ([address] 10/02/2016    Ventral hernia     Wound of abdomen     Wound of left ankle        PAST SURGICAL HISTORY    Past Surgical History:   Procedure Laterality Date    ARM SURGERY         FAMILY HISTORY    Family History   Problem Relation Age of Onset    Cancer Mother     Cancer Father        SOCIAL HISTORY    Social History     Tobacco Use Smoking status: Every Day     Packs/day: 0.25     Types: Cigarettes    Smokeless tobacco: Never   Vaping Use    Vaping Use: Never used   Substance Use Topics    Alcohol use: No    Drug use: No       ALLERGIES    No Known Allergies    MEDICATIONS    Current Outpatient Medications on File Prior to Encounter   Medication Sig Dispense Refill    atorvastatin (LIPITOR) 80 MG tablet Take 80 mg by mouth daily      HYDROcodone-acetaminophen (NORCO) 5-325 MG per tablet Take 1 tablet by mouth every 6 hours as needed for Pain. aspirin EC 81 MG EC tablet Take 1 tablet by mouth daily 30 tablet 3    insulin glargine (LANTUS SOLOSTAR) 100 UNIT/ML injection pen Inject 25 Units into the skin 2 times daily 15 mL 0    insulin lispro, 1 Unit Dial, (HUMALOG KWIKPEN) 100 UNIT/ML SOPN Inject 10 Units into the skin 3 times daily (before meals) 9 mL 0    Insulin Pen Needle ([employer] PEN NEEDLES 31G) 31G X 8 MM MISC 1 each by Does not apply route daily 100 each 3    nicotine (NICODERM CQ) 21 MG/24HR Place 1 patch onto the skin daily 30 patch 3    OLANZapine (ZYPREXA) 5 MG tablet Take 1 tablet by mouth nightly 30 tablet 3    rosuvastatin (CRESTOR) 40 MG tablet Take 1 tablet by mouth nightly 30 tablet 3     No current facility-administered medications on file prior to encounter. REVIEW OF SYSTEMS    Review of Systems   Constitutional:  Negative for chills and fever. Skin:  Positive for wound. Objective:      BP (!) 144/79   Pulse (!) 110   Temp 97.9 °F (36.6 °C) (Tympanic)   Resp 18     Wt Readings from Last 3 Encounters:   11/30/22 176 lb 7 oz (80 kg)   09/19/22 180 lb (81.6 kg)   01/29/22 172 lb (78 kg)       Physical Exam:  General:  Alert and oriented x3. In no acute distress.      Lower Extremity Physical Exam:    Vascular: DP pulses are not palpable, Bilateral. PT pulses are not palpable, Bilateral. CFT <4 seconds to all digits except the right hallux, Bilateral.  No edema, Bilateral.  Hair growth is absent to the level of the lower leg, Bilateral.     Neuro: Saph/sural/SP/DP/plantar sensation diminished to light touch. Musculoskeletal: EHL/FHL/GS/TA gross motor intact. Gross deformity is absent. Dermatologic: Right hallux is necrotic and dry consistent with dry gangrene. There is exposed subcutaneous tissue at the margins. There is no erythema. There is no purulent drainage. There is no fluctuance or crepitus.      Wound 12/14/22 Toe (Comment  which one) Anterior;Right #1 Right great toe (Active)   Wound Image   12/14/22 1046   Wound Etiology Arterial 12/14/22 1046   Dressing/Treatment Other (comment) 12/14/22 1116   Offloading for Diabetic Foot Ulcers Offloading ordered;Post op shoe 12/14/22 1116   Wound Length (cm) 6 cm 12/14/22 1046   Wound Width (cm) 5 cm 12/14/22 1046   Wound Depth (cm) 0.2 cm 12/14/22 1046   Wound Surface Area (cm^2) 30 cm^2 12/14/22 1046   Wound Volume (cm^3) 6 cm^3 12/14/22 1046   Post-Procedure Length (cm) 6 cm 12/14/22 1046   Post-Procedure Width (cm) 5 cm 12/14/22 1046   Post-Procedure Depth (cm) 0.2 cm 12/14/22 1046   Post-Procedure Surface Area (cm^2) 30 cm^2 12/14/22 1046   Post-Procedure Volume (cm^3) 6 cm^3 12/14/22 1046   Wound Assessment Pink/red;Eschar dry;Slough 12/14/22 1046   Drainage Amount Moderate 12/14/22 1046   Drainage Description Serosanguinous 12/14/22 1046   Odor None 12/14/22 1046   Comfort-wound Assessment Blanchable erythema 12/14/22 1046   Margins Defined edges 12/14/22 1046   Wound Thickness Description not for Pressure Injury Full thickness 12/14/22 1046   Number of days: 0            Assessment:      Active Hospital Problems    Diagnosis Date Noted    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 MRI and PVR/PPG from hospitalization reviewed    Discussed MRI and noninvasive vascular testing with patient. He has significant arterial occlusive disease on the right side. He will need to see vascular surgery as soon as possible for further evaluation and management prior to amputation. Discussed amputation of the right great toe due to osteomyelitis gangrene. Continue antibiotics per infectious disease and follow-up with them as directed    Attempted to make appointment for 3:15 PM today with vascular surgery however they declined and wished to schedule a different day. We will have him seen next week with vascular surgery. Betadine soaked gauze to gangrenous digit daily     Patient is high risk of limb loss given gangrene, arterial disease, medical comorbidity, and social situation. He continues to smoke daily. Discussed importance of cessation for vascular health and wound healing. Educated on signs and symptoms of infection. Instructed to call clinic immediately or go to ER if signs and symptoms of infection are present. RTC 1 week with vascular surgery           Written patient discharge instructions given to patient and signed by patient or POA. No orders of the defined types were placed in this encounter. No orders of the defined types were placed in this encounter.          Discharge Instructions            [address] [facility],5Th Floor -Phone: [phone] Fax: [phone]   Visit  Discharge Instructions / Physician Orders    DATE: 12/14/2022     Home Care:      SUPPLIES ORDERED THRU:      Wound Location: Right Great Toe     Cleanse with: Liquid antibacterial soap and water, rinse well      Dressing Orders: Betadine soaked gauze, dry dressing, Post Op Shoe     Frequency: Daily     Additional Orders: Increase protein to diet (meat, cheese, eggs, fish, peanut butter, nuts and beans)  Multivitamin daily    [facility] 1 week with Dr. Laura López     (Please note your next appointment above and if you are unable to keep, kindly give a 24 hour notice. Thank you.)  If more than 15 min late we cannot guarantee you will be seen due to clinician schedule  Per Policy, Excessive cancellation will call for dismissal from program.     If you experience any of the following, please call the [address] [place] [address] during business hours:  [phone]  Your Phone call may be forwarded to [employer] during business hours that [facility] is closed. * Increase in Pain  * Temperature over 101  * Increase in drainage from your wound  * Drainage with a foul odor  * Bleeding  * Increase in swelling  * Need for compression bandage changes due to slippage, breakthrough drainage. If you need medical attention outside of the business hours of the [address] [place] [address] please contact your PCP or go to the nearest emergency room. The information contained in the After Visit Summary has been reviewed with me, the patient and/or responsible adult, by my health care provider(s). I had the opportunity to ask questions regarding this information.  I have elected to receive;      []After Visit Summary  [x]Comprehensive Discharge Instruction      Patient signature______________________________________Date:________  Electronically signed by Olena Jarvis RN on 12/14/2022 at 11:02 AM  Electronically signed by Michael Johnson DPM on 12/14/2022 at 10:28 AM        Electronically signed by Michael Johnson DPM on 12/14/2022 at 11:21 AM

## 2022-12-16 NOTE — DISCHARGE INSTRUCTIONS
[address] [facility],5Th Floor -Phone: [phone] Fax: [phone]    Visit  Discharge Instructions / Physician Orders     DATE: 12/21/2022     Home Care:      SUPPLIES ORDERED THRU:      Wound Location: Right Great Toe     Cleanse with: Liquid antibacterial soap and water, rinse well      Dressing Orders: Betadine soaked gauze, dry dressing, Post Op Shoe     Frequency: Daily     Additional Orders: Increase protein to diet (meat, cheese, eggs, fish, peanut butter, nuts and beans)  Multivitamin daily     Your next appointment with [address] [place] [address] is in 1 week with Dr. Kolton Dsouza     (Please note your next appointment above and if you are unable to keep, kindly give a 24 hour notice. Thank you.)  If more than 15 min late we cannot guarantee you will be seen due to clinician schedule  Per Policy, Excessive cancellation will call for dismissal from program.     If you experience any of the following, please call the [address] [place] [address] during business hours:  [phone]  Your Phone call may be forwarded to [employer] during business hours that [facility] is closed. * Increase in Pain  * Temperature over 101  * Increase in drainage from your wound  * Drainage with a foul odor  * Bleeding  * Increase in swelling  * Need for compression bandage changes due to slippage, breakthrough drainage. If you need medical attention outside of the business hours of the [address] [place] [address] please contact your PCP or go to the nearest emergency room. The information contained in the After Visit Summary has been reviewed with me, the patient and/or responsible adult, by my health care provider(s). I had the opportunity to ask questions regarding this information.  I have elected to receive;      []After Visit Summary  [x]Comprehensive Discharge Instruction        Patient signature______________________________________Date:________

## 2022-12-21 ENCOUNTER — HOSPITAL ENCOUNTER (OUTPATIENT)
Dept: WOUND CARE | Age: 57
Discharge: HOME | End: 2022-12-21

## 2023-01-20 NOTE — DISCHARGE INSTRUCTIONS
1000 [facility],5Th Floor -Phone: [phone] Fax: [phone]    Visit  Discharge Instructions / Physician Orders     DATE: 1/24/2023     Home Care:      SUPPLIES ORDERED THRU:      Wound Location: Right Great Toe     Cleanse with: Liquid antibacterial soap and water, rinse well      Dressing Orders: Betadine soaked gauze, dry dressing, Post Op Shoe     Frequency: Daily     Additional Orders: Increase protein to diet (meat, cheese, eggs, fish, peanut butter, nuts and beans)     Your next appointment with [address] [place] [address] is in 1 week      (Please note your next appointment above and if you are unable to keep, kindly give a 24 hour notice. Thank you.)  If more than 15 min late we cannot guarantee you will be seen due to clinician schedule  Per Policy, Excessive cancellation will call for dismissal from program.     If you experience any of the following, please call the [address] [place] [address] during business hours:  [phone]  Your Phone call may be forwarded to [phone] during business hours that [facility] is closed. * Increase in Pain  * Temperature over 101  * Increase in drainage from your wound  * Drainage with a foul odor  * Bleeding  * Increase in swelling  * Need for compression bandage changes due to slippage, breakthrough drainage. If you need medical attention outside of the business hours of the [address] [place] [address] please contact your PCP or go to the nearest emergency room. The information contained in the After Visit Summary has been reviewed with me, the patient and/or responsible adult, by my health care provider(s). I had the opportunity to ask questions regarding this information.  I have elected to receive;      []After Visit Summary  [x]Comprehensive Discharge Instruction        Patient signature______________________________________Date:________

## 2023-01-24 ENCOUNTER — HOSPITAL ENCOUNTER (OUTPATIENT)
Dept: WOUND CARE | Age: 58
Discharge: HOME | End: 2023-01-24

## 2023-01-25 ENCOUNTER — TELEPHONE (OUTPATIENT)
Dept: PHARMACY | Age: 58
End: 2023-01-25

## 2023-01-25 NOTE — TELEPHONE ENCOUNTER
Called pt regarding 11/29/22 referral from hospitalist for diabetes. Spoke with sister, appt made for 2/7.

## 2023-01-30 NOTE — DISCHARGE INSTRUCTIONS
[address] [facility],5Th Floor -Phone: [phone] Fax: [phone]    Visit  Discharge Instructions / Physician Orders     DATE: 2/1/2023     Home Care:      SUPPLIES ORDERED THRU:      Wound Location: Right Great Toe     Cleanse with: Liquid antibacterial soap and water, rinse well      Dressing Orders: Betadine soaked gauze, dry dressing, Post Op Shoe     Frequency: Daily     Additional Orders: Increase protein to diet (meat, cheese, eggs, fish, peanut butter, nuts and beans)  Multivitamin daily     Your next appointment with [address] [place] [address] is in 1 week with Dr. Sasha Berry     (Please note your next appointment above and if you are unable to keep, kindly give a 24 hour notice. Thank you.)  If more than 15 min late we cannot guarantee you will be seen due to clinician schedule  Per Policy, Excessive cancellation will call for dismissal from program.     If you experience any of the following, please call the [address] [place] [address] during business hours:  [phone]  Your Phone call may be forwarded to [employer] during business hours that [facility] is closed. * Increase in Pain  * Temperature over 101  * Increase in drainage from your wound  * Drainage with a foul odor  * Bleeding  * Increase in swelling  * Need for compression bandage changes due to slippage, breakthrough drainage. If you need medical attention outside of the business hours of the [address] [place] [address] please contact your PCP or go to the nearest emergency room. The information contained in the After Visit Summary has been reviewed with me, the patient and/or responsible adult, by my health care provider(s). I had the opportunity to ask questions regarding this information.  I have elected to receive;      []After Visit Summary  [x]Comprehensive Discharge Instruction        Patient signature______________________________________Date:________

## 2023-02-01 ENCOUNTER — HOSPITAL ENCOUNTER (OUTPATIENT)
Dept: WOUND CARE | Age: 58
Discharge: HOME | End: 2023-02-01

## 2023-02-06 ENCOUNTER — HOSPITAL ENCOUNTER (OUTPATIENT)
Dept: WOUND CARE | Age: 58
Discharge: HOME | End: 2023-02-06

## 2023-02-07 ENCOUNTER — TELEPHONE (OUTPATIENT)
Dept: PHARMACY | Age: 58
End: 2023-02-07

## 2023-02-07 NOTE — TELEPHONE ENCOUNTER
Patient's sister LVM to cancel 11am appointment today. Tried to call back to reschedule but had to LVM.

## 2023-02-22 ENCOUNTER — HOSPITAL ENCOUNTER (OUTPATIENT)
Dept: INTERVENTIONAL RADIOLOGY/VASCULAR | Age: 58
Discharge: HOME | End: 2023-02-24
Payer: MEDICARE

## 2023-02-22 VITALS
HEIGHT: 73 IN | WEIGHT: 194.8 LBS | OXYGEN SATURATION: 96 % | SYSTOLIC BLOOD PRESSURE: 137 MMHG | RESPIRATION RATE: 16 BRPM | BODY MASS INDEX: 25.82 KG/M2 | DIASTOLIC BLOOD PRESSURE: 95 MMHG | TEMPERATURE: 97.7 F | HEART RATE: 110 BPM

## 2023-02-22 LAB
BUN SERPL-MCNC: 11 MG/DL (ref 6–20)
CREAT SERPL-MCNC: 0.8 MG/DL (ref 0.7–1.2)
GFR SERPL CREATININE-BSD FRML MDRD: >60 ML/MIN/1.73M2
GLUCOSE SERPL-MCNC: 470 MG/DL (ref 70–99)
HCT VFR BLD AUTO: 39.9 % (ref 40.7–50.3)
HGB BLD-MCNC: 13.7 G/DL (ref 13–17)
INR PPP: 1
MCH RBC QN AUTO: 28.7 PG (ref 25.2–33.5)
MCHC RBC AUTO-ENTMCNC: 34.3 G/DL (ref 28.4–34.8)
MCV RBC AUTO: 83.6 FL (ref 82.6–102.9)
NRBC AUTOMATED: 0 PER 100 WBC
PARTIAL THROMBOPLASTIN TIME: 27.4 SEC (ref 23.9–33.8)
PDW BLD-RTO: 12.1 % (ref 11.8–14.4)
PLATELET # BLD AUTO: 244 K/UL (ref 138–453)
PMV BLD AUTO: 10.1 FL (ref 8.1–13.5)
PROTHROMBIN TIME: 12.6 SEC (ref 11.5–14.2)
RBC # BLD: 4.77 M/UL (ref 4.21–5.77)
WBC # BLD AUTO: 9.2 K/UL (ref 3.5–11.3)

## 2023-02-22 PROCEDURE — 82565 ASSAY OF CREATININE: CPT

## 2023-02-22 PROCEDURE — 82947 ASSAY GLUCOSE BLOOD QUANT: CPT

## 2023-02-22 PROCEDURE — 85730 THROMBOPLASTIN TIME PARTIAL: CPT

## 2023-02-22 PROCEDURE — 85610 PROTHROMBIN TIME: CPT

## 2023-02-22 PROCEDURE — 85027 COMPLETE CBC AUTOMATED: CPT

## 2023-02-22 PROCEDURE — 84520 ASSAY OF UREA NITROGEN: CPT

## 2023-02-22 RX ORDER — SODIUM CHLORIDE 450 MG/100ML
INJECTION, SOLUTION INTRAVENOUS CONTINUOUS
OUTPATIENT
Start: 2023-02-22

## 2023-02-22 ASSESSMENT — PAIN - FUNCTIONAL ASSESSMENT: PAIN_FUNCTIONAL_ASSESSMENT: 0-10

## 2023-02-22 NOTE — H&P
Interval H&P Note    Pt Name: Radha Gonzalez  MRN: [id number]  YOB: 1965  Date of evaluation: 2/22/2023      [x] I have reviewed the hardcopy Vascular Progress Note by Dr America Vidales dated 1/30/23 labeled in short chart for the Interval History and Physical note. [x] I have examined  Radha Gonzalez  There are no changes to the patient who is scheduled for ANGIOGRAM WITH RUNOFF, RIGHT LEG, LEFT APPROACH IN IR BY DR SAUD MENENDEZ FOR GANGRENE TOE RIGHT FOOT. The patient smoked a cigarette before coming into preop   He denies fever, chills, wheezing, increased SOB, chest pain, open sores or wounds. Patient has not been taking any medication Does not know when his last ASA 81mg was Protime 12.6 and INR 1.0 today . +DM II BS result in epic     Past Medical History:     Past Medical History:   Diagnosis Date    Acute encephalopathy     Acute kidney injury ([facility]     Homelessness     Hyperglycemia     Hyperlipidemia     Ketonemia     Lactic acidosis     Noncompliance     Schizoaffective disorder (HCC)     Tobacco abuse     Type 2 diabetes mellitus with hyperosmolar nonketotic hyperglycemia ([facility] 10/02/2016    Ventral hernia     Wound of abdomen     Wound of left ankle         Past Surgical History:     Past Surgical History:   Procedure Laterality Date    ARM SURGERY          Social History:     Tobacco:    reports that he has been smoking cigarettes. He has been smoking an average of .25 packs per day. He has never used smokeless tobacco.  Alcohol:      reports no history of alcohol use. Drug Use:  reports no history of drug use. Family History:     Family History   Problem Relation Age of Onset    Cancer Mother     Cancer Father         Vital signs: BP (!) 137/95   Pulse (!) 110   Temp 97.7 °F (36.5 °C)   Resp 16   Ht 6' 1\" (1.854 m)   Wt 194 lb 12.8 oz (88.4 kg)   SpO2 96%   BMI 25.70 kg/m²     Allergies:  Patient has no known allergies.     Medications:    Prior to Admission medications    Medication Sig Start Date End Date Taking? Authorizing Provider   atorvastatin (LIPITOR) 80 MG tablet Take 80 mg by mouth daily    Historical Provider, MD   HYDROcodone-acetaminophen (NORCO) 5-325 MG per tablet Take 1 tablet by mouth every 6 hours as needed for Pain. Historical Provider, MD   aspirin EC 81 MG EC tablet Take 1 tablet by mouth daily 11/29/22   Burt Smallwood MD   insulin glargine (LANTUS SOLOSTAR) 100 UNIT/ML injection pen Inject 25 Units into the skin 2 times daily 11/29/22 12/29/22  Burt Smallwood MD   insulin lispro, 1 Unit Dial, (HUMALOG KWIKPEN) 100 UNIT/ML SOPN Inject 10 Units into the skin 3 times daily (before meals) 11/29/22 12/29/22  Burt Smallwood MD   Insulin Pen Needle ([employer] PEN NEEDLES 31G) 31G X 8 MM MISC 1 each by Does not apply route daily 11/29/22   Burt Smallwood MD   nicotine (NICODERM CQ) 21 MG/24HR Place 1 patch onto the skin daily 11/30/22   Burt Smallwood MD   OLANZapine (ZYPREXA) 5 MG tablet Take 1 tablet by mouth nightly 11/29/22 12/29/22  Burt Smallwood MD   rosuvastatin (CRESTOR) 40 MG tablet Take 1 tablet by mouth nightly 11/29/22   Burt Smallwood MD         This is a 56 y.o. male who is cooperative, alert and oriented x3, good eye contact, in no acute distress. EENT: Dentition poor with lower front teeth remaining   Heart: Heart sounds are normal.   tachycardic asymptomatic rate and regular rhythm Repeat apical HR 88 without murmur, gallop or rub. Lungs: intermittent nonproductive cough  Normal respiratory effort with equal expansion, unlabored at rest w/o use of accessory muscles or wheezes currently  Abdomen: nontender very large soft abdominal hernia, nontender, nondistended with bowel sounds . Extremities: ace wrap dressing over right foot.  Foul odor      Labs:  Recent Labs     02/22/23  1207   HGB 13.7   HCT 39.9*   WBC 9.2   MCV 83.6      BUN 11   CREATININE 0.80   INR 1.0   PROTIME 12.6 APTT 27.4       No results for input(s): COVID19 in the last 720 hours.     CATALINO Gonzáles - CNP  Electronically signed 2/22/2023 at 12:59 PM

## 2023-02-22 NOTE — PROGRESS NOTES
Dr Arredondo notified of blood sugar of 470.  Procedure cancelled and pt informed to follow up at [facility] to get his meds refilled and blood sugar under control. Spoke with sister Divya and she is aware.

## 2023-02-24 NOTE — TELEPHONE ENCOUNTER
I called and spoke to patient's sister who states she will bring him to PCP next week and she will call to set up diabetic ed appt after that.

## 2023-04-14 ENCOUNTER — TELEPHONE (OUTPATIENT)
Dept: PHARMACY | Age: 58
End: 2023-04-14

## 2024-02-14 NOTE — ED NOTES
[facility]  [address] [address]  Dept: [phone]  Dept Fax: [phone]    Behavioral Health Services Progress Note      Patient:  Paulina Yan    :  1957    Date of Service:  2024    The encounter diagnosis was Major depressive disorder, recurrent episode, moderate (CMD).    55 minutes were spent with the patient in a video encounter.    Data and Progress toward goal(s) and objective(s):  Paulina presented to session via [employer]. She was stressed out, frustrated trying to figure how to manage her irritability.     Intervention:  Dialectical Behavioral Strategies and Psychoeducation    Patient continues to be involved in service planning:  YES    Describe above interventions:  Clinician provided space for client to share events that caused frustration; validated her. Clinician provided psychoeducation on when mood is elevated the mind is clouded causing forgetfulness due to the issue taking over. Clinician guided client to use urge surfing when she is in a safe place to process her feelings as they need to be released to help with cloudiness once she allows herself to feel then follow up with what can I do now or an affirmation. Clinician also encouraged client to take breaks as needed as the body and mind need to heal after high stressful situations.     Patient's response to interventions:  Client was engaged, will try urge surfing, and receptive to insight.     Continue to support patient's efforts and progress towards established treatment plan goals in the following ways:  assist a client to achieve and maintain rehabilitative, resiliency and recovery goals      YIN Adhikari    This visit is being performed virtually via Video visit using [employer] Evangelist.   Clinical Location: [facility]  Paulina Varner's location Home and is physically present in   the [facility] at the time of  XR at bedside.       Price Shelley RN  11/25/22 2299 this visit.

## 2025-01-09 ENCOUNTER — APPOINTMENT (OUTPATIENT)
Dept: GENERAL RADIOLOGY | Age: 60
DRG: 853 | End: 2025-01-09
Payer: MEDICARE

## 2025-01-09 ENCOUNTER — HOSPITAL ENCOUNTER (INPATIENT)
Age: 60
LOS: 11 days | Discharge: NURSING, REHAB OR LONG-TERM CARE | DRG: 853 | End: 2025-01-20
Attending: EMERGENCY MEDICINE | Admitting: INTERNAL MEDICINE
Payer: MEDICARE

## 2025-01-09 ENCOUNTER — APPOINTMENT (OUTPATIENT)
Dept: CT IMAGING | Age: 60
DRG: 853 | End: 2025-01-09
Payer: MEDICARE

## 2025-01-09 DIAGNOSIS — R06.02 SHORTNESS OF BREATH: ICD-10-CM

## 2025-01-09 DIAGNOSIS — E11.628 DIABETIC FOOT INFECTION (HCC): Primary | ICD-10-CM

## 2025-01-09 DIAGNOSIS — L08.9 DIABETIC FOOT INFECTION (HCC): Primary | ICD-10-CM

## 2025-01-09 DIAGNOSIS — M86.9 OSTEOMYELITIS, UNSPECIFIED SITE, UNSPECIFIED TYPE: ICD-10-CM

## 2025-01-09 DIAGNOSIS — I50.20 HFREF (HEART FAILURE WITH REDUCED EJECTION FRACTION) (HCC): ICD-10-CM

## 2025-01-09 DIAGNOSIS — A41.9 SEPTICEMIA (HCC): ICD-10-CM

## 2025-01-09 LAB
ALBUMIN SERPL-MCNC: 2.8 G/DL (ref 3.5–5.2)
ALP SERPL-CCNC: 241 U/L (ref 40–129)
ALT SERPL-CCNC: 61 U/L (ref 10–50)
ANION GAP SERPL CALCULATED.3IONS-SCNC: 8 MMOL/L (ref 9–16)
AST SERPL-CCNC: 36 U/L (ref 10–50)
BASOPHILS # BLD: 0 K/UL (ref 0–0.2)
BASOPHILS NFR BLD: 0 % (ref 0–2)
BILIRUB SERPL-MCNC: 0.5 MG/DL (ref 0–1.2)
BUN SERPL-MCNC: 13 MG/DL (ref 6–20)
CALCIUM SERPL-MCNC: 8.3 MG/DL (ref 8.6–10.4)
CHLORIDE SERPL-SCNC: 103 MMOL/L (ref 98–107)
CO2 SERPL-SCNC: 26 MMOL/L (ref 20–31)
CREAT SERPL-MCNC: 0.8 MG/DL (ref 0.7–1.2)
CRP SERPL HS-MCNC: 83.2 MG/L (ref 0–5)
EOSINOPHIL # BLD: 0 K/UL (ref 0–0.4)
EOSINOPHILS RELATIVE PERCENT: 0 % (ref 0–4)
ERYTHROCYTE [DISTWIDTH] IN BLOOD BY AUTOMATED COUNT: 14 % (ref 11.5–14.9)
ERYTHROCYTE [SEDIMENTATION RATE] IN BLOOD BY PHOTOMETRIC METHOD: 24 MM/HR (ref 0–20)
GFR, ESTIMATED: >90 ML/MIN/1.73M2
GLUCOSE BLD-MCNC: 120 MG/DL (ref 75–110)
GLUCOSE SERPL-MCNC: 191 MG/DL (ref 74–99)
HCT VFR BLD AUTO: 31.8 % (ref 41–53)
HGB BLD-MCNC: 10.4 G/DL (ref 13.5–17.5)
LACTATE BLDV-SCNC: 1.3 MMOL/L (ref 0.5–1.9)
LYMPHOCYTES NFR BLD: 1.68 K/UL (ref 1–4.8)
LYMPHOCYTES RELATIVE PERCENT: 8 % (ref 24–44)
MCH RBC QN AUTO: 29.1 PG (ref 26–34)
MCHC RBC AUTO-ENTMCNC: 32.8 G/DL (ref 31–37)
MCV RBC AUTO: 88.6 FL (ref 80–100)
MONOCYTES NFR BLD: 1.26 K/UL (ref 0.1–1.3)
MONOCYTES NFR BLD: 6 % (ref 1–7)
MORPHOLOGY: NORMAL
NEUTROPHILS NFR BLD: 86 % (ref 36–66)
NEUTS SEG NFR BLD: 18.06 K/UL (ref 1.3–9.1)
PLATELET # BLD AUTO: 295 K/UL (ref 150–450)
PMV BLD AUTO: 8.5 FL (ref 6–12)
POTASSIUM SERPL-SCNC: 3.9 MMOL/L (ref 3.7–5.3)
PROCALCITONIN SERPL-MCNC: 0.25 NG/ML (ref 0–0.09)
PROT SERPL-MCNC: 5.7 G/DL (ref 6.6–8.7)
RBC # BLD AUTO: 3.59 M/UL (ref 4.5–5.9)
SODIUM SERPL-SCNC: 137 MMOL/L (ref 136–145)
WBC OTHER # BLD: 21 K/UL (ref 3.5–11)

## 2025-01-09 PROCEDURE — 85652 RBC SED RATE AUTOMATED: CPT

## 2025-01-09 PROCEDURE — 86140 C-REACTIVE PROTEIN: CPT

## 2025-01-09 PROCEDURE — 6360000002 HC RX W HCPCS: Performed by: EMERGENCY MEDICINE

## 2025-01-09 PROCEDURE — 2580000003 HC RX 258: Performed by: EMERGENCY MEDICINE

## 2025-01-09 PROCEDURE — 99285 EMERGENCY DEPT VISIT HI MDM: CPT

## 2025-01-09 PROCEDURE — 6370000000 HC RX 637 (ALT 250 FOR IP): Performed by: EMERGENCY MEDICINE

## 2025-01-09 PROCEDURE — 80053 COMPREHEN METABOLIC PANEL: CPT

## 2025-01-09 PROCEDURE — 73700 CT LOWER EXTREMITY W/O DYE: CPT

## 2025-01-09 PROCEDURE — 2060000000 HC ICU INTERMEDIATE R&B

## 2025-01-09 PROCEDURE — 85025 COMPLETE CBC W/AUTO DIFF WBC: CPT

## 2025-01-09 PROCEDURE — 82947 ASSAY GLUCOSE BLOOD QUANT: CPT

## 2025-01-09 PROCEDURE — 96375 TX/PRO/DX INJ NEW DRUG ADDON: CPT

## 2025-01-09 PROCEDURE — 87040 BLOOD CULTURE FOR BACTERIA: CPT

## 2025-01-09 PROCEDURE — 84145 PROCALCITONIN (PCT): CPT

## 2025-01-09 PROCEDURE — 93005 ELECTROCARDIOGRAM TRACING: CPT | Performed by: EMERGENCY MEDICINE

## 2025-01-09 PROCEDURE — 83605 ASSAY OF LACTIC ACID: CPT

## 2025-01-09 PROCEDURE — 36415 COLL VENOUS BLD VENIPUNCTURE: CPT

## 2025-01-09 PROCEDURE — 96365 THER/PROPH/DIAG IV INF INIT: CPT

## 2025-01-09 PROCEDURE — 71045 X-RAY EXAM CHEST 1 VIEW: CPT

## 2025-01-09 PROCEDURE — 73630 X-RAY EXAM OF FOOT: CPT

## 2025-01-09 RX ORDER — IBUPROFEN 200 MG
400 TABLET ORAL EVERY 6 HOURS PRN
Status: ON HOLD | COMMUNITY

## 2025-01-09 RX ORDER — ACETAMINOPHEN 500 MG
1000 TABLET ORAL EVERY 6 HOURS PRN
Status: ON HOLD | COMMUNITY

## 2025-01-09 RX ORDER — ENOXAPARIN SODIUM 100 MG/ML
40 INJECTION SUBCUTANEOUS DAILY
Status: DISCONTINUED | OUTPATIENT
Start: 2025-01-10 | End: 2025-01-20 | Stop reason: HOSPADM

## 2025-01-09 RX ORDER — SODIUM CHLORIDE 9 MG/ML
INJECTION, SOLUTION INTRAVENOUS PRN
Status: DISCONTINUED | OUTPATIENT
Start: 2025-01-09 | End: 2025-01-20 | Stop reason: HOSPADM

## 2025-01-09 RX ORDER — SODIUM CHLORIDE 0.9 % (FLUSH) 0.9 %
5-40 SYRINGE (ML) INJECTION EVERY 12 HOURS SCHEDULED
Status: DISCONTINUED | OUTPATIENT
Start: 2025-01-10 | End: 2025-01-20 | Stop reason: HOSPADM

## 2025-01-09 RX ORDER — MAGNESIUM SULFATE HEPTAHYDRATE 40 MG/ML
2000 INJECTION, SOLUTION INTRAVENOUS PRN
Status: DISCONTINUED | OUTPATIENT
Start: 2025-01-09 | End: 2025-01-20 | Stop reason: HOSPADM

## 2025-01-09 RX ORDER — ACETAMINOPHEN 650 MG/1
650 SUPPOSITORY RECTAL EVERY 6 HOURS PRN
Status: DISCONTINUED | OUTPATIENT
Start: 2025-01-09 | End: 2025-01-20 | Stop reason: HOSPADM

## 2025-01-09 RX ORDER — POTASSIUM CHLORIDE 1500 MG/1
40 TABLET, EXTENDED RELEASE ORAL PRN
Status: DISCONTINUED | OUTPATIENT
Start: 2025-01-09 | End: 2025-01-20 | Stop reason: HOSPADM

## 2025-01-09 RX ORDER — POLYETHYLENE GLYCOL 3350 17 G/17G
17 POWDER, FOR SOLUTION ORAL DAILY PRN
Status: DISCONTINUED | OUTPATIENT
Start: 2025-01-09 | End: 2025-01-20 | Stop reason: HOSPADM

## 2025-01-09 RX ORDER — VANCOMYCIN 1.75 G/350ML
1250 INJECTION, SOLUTION INTRAVENOUS EVERY 12 HOURS
Status: DISCONTINUED | OUTPATIENT
Start: 2025-01-10 | End: 2025-01-11

## 2025-01-09 RX ORDER — SODIUM CHLORIDE 9 MG/ML
INJECTION, SOLUTION INTRAVENOUS CONTINUOUS
Status: ACTIVE | OUTPATIENT
Start: 2025-01-10 | End: 2025-01-11

## 2025-01-09 RX ORDER — INSULIN LISPRO 100 [IU]/ML
0-8 INJECTION, SOLUTION INTRAVENOUS; SUBCUTANEOUS
Status: DISCONTINUED | OUTPATIENT
Start: 2025-01-09 | End: 2025-01-20 | Stop reason: HOSPADM

## 2025-01-09 RX ORDER — DEXTROSE MONOHYDRATE 100 MG/ML
INJECTION, SOLUTION INTRAVENOUS CONTINUOUS PRN
Status: DISCONTINUED | OUTPATIENT
Start: 2025-01-09 | End: 2025-01-20 | Stop reason: HOSPADM

## 2025-01-09 RX ORDER — SODIUM CHLORIDE 0.9 % (FLUSH) 0.9 %
10 SYRINGE (ML) INJECTION PRN
Status: DISCONTINUED | OUTPATIENT
Start: 2025-01-09 | End: 2025-01-20 | Stop reason: HOSPADM

## 2025-01-09 RX ORDER — ACETAMINOPHEN 325 MG/1
650 TABLET ORAL EVERY 6 HOURS PRN
Status: DISCONTINUED | OUTPATIENT
Start: 2025-01-09 | End: 2025-01-20 | Stop reason: HOSPADM

## 2025-01-09 RX ORDER — ONDANSETRON 2 MG/ML
4 INJECTION INTRAMUSCULAR; INTRAVENOUS EVERY 6 HOURS PRN
Status: DISCONTINUED | OUTPATIENT
Start: 2025-01-09 | End: 2025-01-20 | Stop reason: HOSPADM

## 2025-01-09 RX ORDER — BISACODYL 10 MG
10 SUPPOSITORY, RECTAL RECTAL DAILY PRN
Status: DISCONTINUED | OUTPATIENT
Start: 2025-01-09 | End: 2025-01-20 | Stop reason: HOSPADM

## 2025-01-09 RX ORDER — ONDANSETRON 4 MG/1
4 TABLET, ORALLY DISINTEGRATING ORAL EVERY 8 HOURS PRN
Status: DISCONTINUED | OUTPATIENT
Start: 2025-01-09 | End: 2025-01-20 | Stop reason: HOSPADM

## 2025-01-09 RX ORDER — ACETAMINOPHEN 500 MG
1000 TABLET ORAL ONCE
Status: COMPLETED | OUTPATIENT
Start: 2025-01-09 | End: 2025-01-09

## 2025-01-09 RX ORDER — 0.9 % SODIUM CHLORIDE 0.9 %
30 INTRAVENOUS SOLUTION INTRAVENOUS ONCE
Status: COMPLETED | OUTPATIENT
Start: 2025-01-09 | End: 2025-01-09

## 2025-01-09 RX ADMIN — SODIUM CHLORIDE 2397 ML: 9 INJECTION, SOLUTION INTRAVENOUS at 20:41

## 2025-01-09 RX ADMIN — ACETAMINOPHEN 1000 MG: 500 TABLET, FILM COATED ORAL at 21:18

## 2025-01-09 RX ADMIN — PIPERACILLIN AND TAZOBACTAM 4500 MG: 4; .5 INJECTION, POWDER, LYOPHILIZED, FOR SOLUTION INTRAVENOUS at 20:59

## 2025-01-09 RX ADMIN — VANCOMYCIN HYDROCHLORIDE 2500 MG: 1 INJECTION, POWDER, LYOPHILIZED, FOR SOLUTION INTRAVENOUS at 21:34

## 2025-01-09 ASSESSMENT — PAIN SCALES - GENERAL
PAINLEVEL_OUTOF10: 4
PAINLEVEL_OUTOF10: 7
PAINLEVEL_OUTOF10: 5

## 2025-01-09 ASSESSMENT — PAIN DESCRIPTION - ORIENTATION: ORIENTATION: LEFT

## 2025-01-09 ASSESSMENT — LIFESTYLE VARIABLES
HOW OFTEN DO YOU HAVE A DRINK CONTAINING ALCOHOL: NEVER
HOW MANY STANDARD DRINKS CONTAINING ALCOHOL DO YOU HAVE ON A TYPICAL DAY: PATIENT DOES NOT DRINK

## 2025-01-09 ASSESSMENT — PAIN DESCRIPTION - DESCRIPTORS: DESCRIPTORS: DISCOMFORT

## 2025-01-09 ASSESSMENT — PAIN - FUNCTIONAL ASSESSMENT
PAIN_FUNCTIONAL_ASSESSMENT: 0-10
PAIN_FUNCTIONAL_ASSESSMENT: 0-10

## 2025-01-09 ASSESSMENT — PAIN DESCRIPTION - LOCATION
LOCATION: FOOT
LOCATION: FOOT

## 2025-01-09 ASSESSMENT — PAIN DESCRIPTION - PAIN TYPE: TYPE: ACUTE PAIN

## 2025-01-09 NOTE — ED TRIAGE NOTES
Mode of arrival (squad #, walk in, police, etc) : police        Chief complaint(s): Foot pain        Arrival Note (brief scenario, treatment PTA, etc).: Pt was brought in by police and denies SI or HI. Pt is A&OX4.        C= \"Have you ever felt that you should Cut down on your drinking?\"  No  A= \"Have people Annoyed you by criticizing your drinking?\"  No  G= \"Have you ever felt bad or Guilty about your drinking?\"  Refused  E= \"Have you ever had a drink as an Eye-opener first thing in the morning to steady your nerves or to help a hangover?\"  No      Deferred []      Reason for deferring: N/A    *If yes to two or more: probable alcohol abuse.*

## 2025-01-10 ENCOUNTER — APPOINTMENT (OUTPATIENT)
Dept: MRI IMAGING | Age: 60
DRG: 853 | End: 2025-01-10
Payer: MEDICARE

## 2025-01-10 ENCOUNTER — HOSPITAL ENCOUNTER (INPATIENT)
Dept: VASCULAR LAB | Age: 60
Discharge: HOME | DRG: 853 | End: 2025-01-12
Payer: MEDICARE

## 2025-01-10 PROBLEM — L08.9 DIABETIC FOOT INFECTION (HCC): Status: ACTIVE | Noted: 2025-01-10

## 2025-01-10 PROBLEM — E11.628 DIABETIC FOOT INFECTION (HCC): Status: ACTIVE | Noted: 2025-01-10

## 2025-01-10 LAB
ABO + RH BLD: NORMAL
ANION GAP SERPL CALCULATED.3IONS-SCNC: 7 MMOL/L (ref 9–16)
ARM BAND NUMBER: NORMAL
BACTERIA URNS QL MICRO: ABNORMAL
BASOPHILS # BLD: 0.1 K/UL (ref 0–0.2)
BASOPHILS NFR BLD: 1 % (ref 0–2)
BILIRUB UR QL STRIP: NEGATIVE
BLOOD BANK SAMPLE EXPIRATION: NORMAL
BLOOD GROUP ANTIBODIES SERPL: NEGATIVE
BUN SERPL-MCNC: 15 MG/DL (ref 6–20)
CALCIUM SERPL-MCNC: 7.6 MG/DL (ref 8.6–10.4)
CASTS #/AREA URNS LPF: ABNORMAL /LPF
CHLORIDE SERPL-SCNC: 109 MMOL/L (ref 98–107)
CLARITY UR: CLEAR
CO2 SERPL-SCNC: 24 MMOL/L (ref 20–31)
COLOR UR: ABNORMAL
CREAT SERPL-MCNC: 0.7 MG/DL (ref 0.7–1.2)
CRP SERPL HS-MCNC: 106 MG/L (ref 0–5)
EOSINOPHIL # BLD: 0.1 K/UL (ref 0–0.4)
EOSINOPHILS RELATIVE PERCENT: 0 % (ref 0–4)
EPI CELLS #/AREA URNS HPF: ABNORMAL /HPF
ERYTHROCYTE [DISTWIDTH] IN BLOOD BY AUTOMATED COUNT: 13.6 % (ref 11.5–14.9)
ERYTHROCYTE [SEDIMENTATION RATE] IN BLOOD BY PHOTOMETRIC METHOD: 16 MM/HR (ref 0–20)
EST. AVERAGE GLUCOSE BLD GHB EST-MCNC: 146 MG/DL
FOLATE SERPL-MCNC: 6.5 NG/ML (ref 4.8–24.2)
GFR, ESTIMATED: >90 ML/MIN/1.73M2
GLUCOSE BLD-MCNC: 122 MG/DL (ref 75–110)
GLUCOSE BLD-MCNC: 123 MG/DL (ref 75–110)
GLUCOSE BLD-MCNC: 128 MG/DL (ref 75–110)
GLUCOSE BLD-MCNC: 143 MG/DL (ref 75–110)
GLUCOSE BLD-MCNC: 180 MG/DL (ref 75–110)
GLUCOSE SERPL-MCNC: 141 MG/DL (ref 74–99)
GLUCOSE UR STRIP-MCNC: NEGATIVE MG/DL
HBA1C MFR BLD: 6.7 % (ref 4–6)
HCT VFR BLD AUTO: 28.7 % (ref 41–53)
HGB BLD-MCNC: 9.8 G/DL (ref 13.5–17.5)
HGB UR QL STRIP.AUTO: NEGATIVE
INR PPP: 1.2
IRON SATN MFR SERPL: 13 % (ref 20–55)
IRON SERPL-MCNC: 21 UG/DL (ref 61–157)
KETONES UR STRIP-MCNC: ABNORMAL MG/DL
LEUKOCYTE ESTERASE UR QL STRIP: ABNORMAL
LYMPHOCYTES NFR BLD: 1.3 K/UL (ref 1–4.8)
LYMPHOCYTES RELATIVE PERCENT: 10 % (ref 24–44)
MCH RBC QN AUTO: 30.3 PG (ref 26–34)
MCHC RBC AUTO-ENTMCNC: 34.2 G/DL (ref 31–37)
MCV RBC AUTO: 88.5 FL (ref 80–100)
MONOCYTES NFR BLD: 1 K/UL (ref 0.1–1.3)
MONOCYTES NFR BLD: 8 % (ref 1–7)
NEUTROPHILS NFR BLD: 81 % (ref 36–66)
NEUTS SEG NFR BLD: 10.1 K/UL (ref 1.3–9.1)
NITRITE UR QL STRIP: NEGATIVE
PH UR STRIP: 6 [PH] (ref 5–8)
PLATELET # BLD AUTO: 268 K/UL (ref 150–450)
PMV BLD AUTO: 8.1 FL (ref 6–12)
POTASSIUM SERPL-SCNC: 3.9 MMOL/L (ref 3.7–5.3)
PROT UR STRIP-MCNC: ABNORMAL MG/DL
PROTHROMBIN TIME: 15.9 SEC (ref 11.8–14.6)
RBC # BLD AUTO: 3.25 M/UL (ref 4.5–5.9)
RBC #/AREA URNS HPF: ABNORMAL /HPF
SODIUM SERPL-SCNC: 140 MMOL/L (ref 136–145)
SP GR UR STRIP: 1.03 (ref 1–1.03)
TIBC SERPL-MCNC: 160 UG/DL (ref 250–450)
UNSATURATED IRON BINDING CAPACITY: 139 UG/DL (ref 112–347)
UROBILINOGEN UR STRIP-ACNC: ABNORMAL EU/DL (ref 0–1)
VIT B12 SERPL-MCNC: 495 PG/ML (ref 232–1245)
WBC #/AREA URNS HPF: ABNORMAL /HPF
WBC OTHER # BLD: 12.5 K/UL (ref 3.5–11)

## 2025-01-10 PROCEDURE — 73718 MRI LOWER EXTREMITY W/O DYE: CPT

## 2025-01-10 PROCEDURE — 86140 C-REACTIVE PROTEIN: CPT

## 2025-01-10 PROCEDURE — 83540 ASSAY OF IRON: CPT

## 2025-01-10 PROCEDURE — 2580000003 HC RX 258: Performed by: NURSE PRACTITIONER

## 2025-01-10 PROCEDURE — 93923 UPR/LXTR ART STDY 3+ LVLS: CPT

## 2025-01-10 PROCEDURE — 6370000000 HC RX 637 (ALT 250 FOR IP): Performed by: NURSE PRACTITIONER

## 2025-01-10 PROCEDURE — 87077 CULTURE AEROBIC IDENTIFY: CPT

## 2025-01-10 PROCEDURE — 81001 URINALYSIS AUTO W/SCOPE: CPT

## 2025-01-10 PROCEDURE — 86901 BLOOD TYPING SEROLOGIC RH(D): CPT

## 2025-01-10 PROCEDURE — 82947 ASSAY GLUCOSE BLOOD QUANT: CPT

## 2025-01-10 PROCEDURE — 86900 BLOOD TYPING SEROLOGIC ABO: CPT

## 2025-01-10 PROCEDURE — 80048 BASIC METABOLIC PNL TOTAL CA: CPT

## 2025-01-10 PROCEDURE — 99222 1ST HOSP IP/OBS MODERATE 55: CPT | Performed by: PODIATRIST

## 2025-01-10 PROCEDURE — 87075 CULTR BACTERIA EXCEPT BLOOD: CPT

## 2025-01-10 PROCEDURE — 83550 IRON BINDING TEST: CPT

## 2025-01-10 PROCEDURE — 6360000002 HC RX W HCPCS: Performed by: EMERGENCY MEDICINE

## 2025-01-10 PROCEDURE — 36415 COLL VENOUS BLD VENIPUNCTURE: CPT

## 2025-01-10 PROCEDURE — 85652 RBC SED RATE AUTOMATED: CPT

## 2025-01-10 PROCEDURE — 82746 ASSAY OF FOLIC ACID SERUM: CPT

## 2025-01-10 PROCEDURE — 6370000000 HC RX 637 (ALT 250 FOR IP): Performed by: INTERNAL MEDICINE

## 2025-01-10 PROCEDURE — 87070 CULTURE OTHR SPECIMN AEROBIC: CPT

## 2025-01-10 PROCEDURE — 85610 PROTHROMBIN TIME: CPT

## 2025-01-10 PROCEDURE — 82607 VITAMIN B-12: CPT

## 2025-01-10 PROCEDURE — 87205 SMEAR GRAM STAIN: CPT

## 2025-01-10 PROCEDURE — 99223 1ST HOSP IP/OBS HIGH 75: CPT | Performed by: INTERNAL MEDICINE

## 2025-01-10 PROCEDURE — 99222 1ST HOSP IP/OBS MODERATE 55: CPT | Performed by: STUDENT IN AN ORGANIZED HEALTH CARE EDUCATION/TRAINING PROGRAM

## 2025-01-10 PROCEDURE — 2060000000 HC ICU INTERMEDIATE R&B

## 2025-01-10 PROCEDURE — 87186 SC STD MICRODIL/AGAR DIL: CPT

## 2025-01-10 PROCEDURE — 6360000002 HC RX W HCPCS: Performed by: NURSE PRACTITIONER

## 2025-01-10 PROCEDURE — 87086 URINE CULTURE/COLONY COUNT: CPT

## 2025-01-10 PROCEDURE — 83036 HEMOGLOBIN GLYCOSYLATED A1C: CPT

## 2025-01-10 PROCEDURE — 85025 COMPLETE CBC W/AUTO DIFF WBC: CPT

## 2025-01-10 PROCEDURE — 86850 RBC ANTIBODY SCREEN: CPT

## 2025-01-10 PROCEDURE — 2500000003 HC RX 250 WO HCPCS: Performed by: NURSE PRACTITIONER

## 2025-01-10 RX ORDER — NICOTINE 21 MG/24HR
1 PATCH, TRANSDERMAL 24 HOURS TRANSDERMAL DAILY
Status: DISCONTINUED | OUTPATIENT
Start: 2025-01-10 | End: 2025-01-20 | Stop reason: HOSPADM

## 2025-01-10 RX ORDER — ACETAMINOPHEN 650 MG
TABLET, EXTENDED RELEASE ORAL PRN
Status: DISCONTINUED | OUTPATIENT
Start: 2025-01-10 | End: 2025-01-20 | Stop reason: HOSPADM

## 2025-01-10 RX ORDER — BENZONATATE 100 MG/1
100 CAPSULE ORAL 3 TIMES DAILY PRN
Status: DISCONTINUED | OUTPATIENT
Start: 2025-01-10 | End: 2025-01-20 | Stop reason: HOSPADM

## 2025-01-10 RX ORDER — LIDOCAINE HYDROCHLORIDE 10 MG/ML
20 INJECTION, SOLUTION EPIDURAL; INFILTRATION; INTRACAUDAL; PERINEURAL ONCE
Status: DISCONTINUED | OUTPATIENT
Start: 2025-01-10 | End: 2025-01-13

## 2025-01-10 RX ADMIN — ENOXAPARIN SODIUM 40 MG: 100 INJECTION SUBCUTANEOUS at 11:50

## 2025-01-10 RX ADMIN — PIPERACILLIN AND TAZOBACTAM 3375 MG: 3; .375 INJECTION, POWDER, LYOPHILIZED, FOR SOLUTION INTRAVENOUS at 18:58

## 2025-01-10 RX ADMIN — BENZONATATE 100 MG: 100 CAPSULE ORAL at 15:06

## 2025-01-10 RX ADMIN — Medication 3 MG: at 00:40

## 2025-01-10 RX ADMIN — PIPERACILLIN AND TAZOBACTAM 3375 MG: 3; .375 INJECTION, POWDER, LYOPHILIZED, FOR SOLUTION INTRAVENOUS at 11:52

## 2025-01-10 RX ADMIN — SODIUM CHLORIDE: 9 INJECTION, SOLUTION INTRAVENOUS at 02:21

## 2025-01-10 RX ADMIN — POLYETHYLENE GLYCOL 3350 17 G: 17 POWDER, FOR SOLUTION ORAL at 00:40

## 2025-01-10 RX ADMIN — SODIUM CHLORIDE: 9 INJECTION, SOLUTION INTRAVENOUS at 09:33

## 2025-01-10 RX ADMIN — PIPERACILLIN AND TAZOBACTAM 3375 MG: 3; .375 INJECTION, POWDER, LYOPHILIZED, FOR SOLUTION INTRAVENOUS at 02:21

## 2025-01-10 RX ADMIN — SODIUM CHLORIDE, PRESERVATIVE FREE 5 ML: 5 INJECTION INTRAVENOUS at 10:46

## 2025-01-10 RX ADMIN — SODIUM CHLORIDE: 9 INJECTION, SOLUTION INTRAVENOUS at 17:25

## 2025-01-10 RX ADMIN — VANCOMYCIN 1250 MG: 1.75 INJECTION, SOLUTION INTRAVENOUS at 21:52

## 2025-01-10 RX ADMIN — ACETAMINOPHEN 650 MG: 325 TABLET ORAL at 15:06

## 2025-01-10 RX ADMIN — SODIUM CHLORIDE: 9 INJECTION, SOLUTION INTRAVENOUS at 00:18

## 2025-01-10 RX ADMIN — SODIUM CHLORIDE: 9 INJECTION, SOLUTION INTRAVENOUS at 09:29

## 2025-01-10 RX ADMIN — VANCOMYCIN 1250 MG: 1.75 INJECTION, SOLUTION INTRAVENOUS at 09:30

## 2025-01-10 ASSESSMENT — PAIN DESCRIPTION - ORIENTATION: ORIENTATION: RIGHT

## 2025-01-10 ASSESSMENT — PAIN DESCRIPTION - DESCRIPTORS: DESCRIPTORS: ACHING;DISCOMFORT

## 2025-01-10 ASSESSMENT — PAIN DESCRIPTION - LOCATION: LOCATION: FOOT

## 2025-01-10 ASSESSMENT — PAIN - FUNCTIONAL ASSESSMENT: PAIN_FUNCTIONAL_ASSESSMENT: ACTIVITIES ARE NOT PREVENTED

## 2025-01-10 ASSESSMENT — PAIN SCALES - GENERAL: PAINLEVEL_OUTOF10: 6

## 2025-01-10 NOTE — H&P
[facility] Internal Medicine  Alex Toro MD; Dandre Morris MD; Ari Agosto MD; MD Cassia Major MD; Priya Lynne MD    [facility] Internal Medicine   IN-PATIENT SERVICE   [facility]    HISTORY AND PHYSICAL EXAMINATION            Date:   1/10/2025  Patient name:  Price Hernandez  Date of admission:  1/9/2025  7:24 PM  MRN:   [id number]  Account:  [id number]  YOB: 1965  PCP:    Gary Cardona  Room:   2118/2118-01  Code Status:    Full Code    Chief Complaint:     Chief Complaint   Patient presents with    Foot Pain       History Obtained From:     Patient medical record nursing staff    History of Present Illness:     Price Hernandez is a 59 y.o. Non- / non  male who presents with Foot Pain   and is admitted to the hospital for the management of Sepsis ([facility]).    Price Hernandez is a 59 y.o. Non- / non  male who presents with Foot Pain   and is admitted to the hospital for the management of Sepsis ([facility]).     Patient's medical history significant for atherosclerosis of right leg with gangrene, noncompliance with medical treatment, osteomyelitis of right foot, PAD, schizoaffective disorder, tobacco abuse, and DM type II.     According to patient, he came to the ED today because his feet have been hurting all week.  States he has had sores on his feet in the past and has been seen and treated at the [facility] on [address].  ED provider reports that patient is homeless and was found outside near the hospital.  He was brought in by police.  Patient noted to have multiple open areas on his left foot, with extremely foul odor and necrotic tissue.  Bilateral feet warm to touch; pulses are audible with Doppler.  Patient reports that he has been off all of his medications, including his insulin, for at least 7 months.  He has no desire to resume taking any the medications due to homelessness and states that

## 2025-01-10 NOTE — ED PROVIDER NOTES
EMERGENCY DEPARTMENT ENCOUNTER    Pt Name: Price Hernandez  MRN: [id number]  Birthdate 1965  Date of evaluation: 1/9/25  CHIEF COMPLAINT       Chief Complaint   Patient presents with    Foot Pain     HISTORY OF PRESENT ILLNESS   Presenting with left foot pain.  He said the pain started last week.  He has discoloration of his toes and there is a foul odor coming from it.  Patient is tachycardic and febrile.  He has a history of diabetes.  He has multiple wounds to his left foot.  Patient is homeless.    The history is provided by the patient.           REVIEW OF SYSTEMS     Review of Systems   Constitutional:  Positive for chills and fever.   HENT:  Negative for congestion.    Respiratory:  Negative for cough and shortness of breath.    Cardiovascular:  Negative for chest pain.   Gastrointestinal:  Negative for abdominal pain, nausea and vomiting.   Musculoskeletal:  Positive for arthralgias and joint swelling. Negative for myalgias.   Skin:  Positive for color change, rash and wound.   Neurological:  Negative for headaches.     PASTMEDICAL HISTORY     Past Medical History:   Diagnosis Date    Acute encephalopathy     Acute kidney injury (HCC)     Homelessness     Hyperglycemia     Hyperlipidemia     Ketonemia     Lactic acidosis     Noncompliance     Schizoaffective disorder (HCC)     Tobacco abuse     Type 2 diabetes mellitus with hyperosmolar nonketotic hyperglycemia (HCC) 10/02/2016    Ventral hernia     Wound of abdomen     Wound of left ankle      Past Problem List  Patient Active Problem List   Diagnosis Code    Ventral hernia without obstruction or gangrene K43.9    Hyperglycemia R73.9    Ketonemia R79.89    Lactic acidosis E87.20    Acute encephalopathy G93.40    Homeless single person Z59.00    Type 2 diabetes mellitus with hyperglycemia, with long-term current use of insulin (HCC) E11.65, Z79.4    History of noncompliance with medical treatment Z91.199    Wound of left ankle S91.002A    Wound of

## 2025-01-10 NOTE — ED NOTES
Report given to MICHEAL Shelley from PCU.   Report method by phone   The following was reviewed with receiving RN:   Current vital signs:  /83   Pulse (!) 101   Temp 99 °F (37.2 °C)   Resp 21   Ht 1.854 m (6' 1\")   Wt 95.3 kg (210 lb)   SpO2 95%   BMI 27.71 kg/m²                MEWS Score: 3     Any medication or safety alerts were reviewed. Any pending diagnostics and notifications were also reviewed, as well as any safety concerns or issues, abnormal labs, abnormal imaging, and abnormal assessment findings. Questions were answered.

## 2025-01-10 NOTE — CARE COORDINATION
Case Management Assessment  Initial Evaluation    Date/Time of Evaluation: 1/10/2025 3:19 PM  Assessment Completed by: Sushma Leyva RN    If patient is discharged prior to next notation, then this note serves as note for discharge by case management.    Patient Name: Price Hernandez                   YOB: 1965  Diagnosis: Septicemia (HCC) [A41.9]  Diabetic foot infection (HCC) [E11.628, L08.9]  Sepsis (HCC) [A41.9]                   Date / Time: 1/9/2025  7:24 PM    Patient Admission Status: Inpatient   Readmission Risk (Low < 19, Mod (19-27), High > 27): Readmission Risk Score: 13    Current PCP: No, Pcp  PCP verified by CM? No (Pt does NOT have one,)    Chart Reviewed: Yes      History Provided by: Patient  Patient Orientation: Alert and Oriented    Patient Cognition: Alert    Hospitalization in the last 30 days (Readmission):  No    If yes, Readmission Assessment in CM Navigator will be completed.    Advance Directives:      Code Status: Full Code   Patient's Primary Decision Maker is: Legal Next of Kin    Primary Decision Maker: Divya Fernandez - Brother/Sister, Legal Guardian - [phone]    Discharge Planning:    Patient lives with: Other (Comment) (States \"He lives in Sister's home\".) Type of Home: House  Primary Care Giver: Self  Patient Support Systems include: Family Members   Current Financial resources: Medicare  Current community resources: None  Current services prior to admission: None            Current DME:              Type of Home Care services:  None    ADLS  Prior functional level: Independent in ADLs/IADLs  Current functional level: Independent in ADLs/IADLs    PT AM-PAC:   /24  OT AM-PAC:   /24    Family can provide assistance at DC: Yes  Would you like Case Management to discuss the discharge plan with any other family members/significant others, and if so, who? Yes (SisterDivya)  Plans to Return to Present Housing: Yes  Other Identified Issues/Barriers

## 2025-01-11 PROBLEM — L97.509 TYPE 2 DIABETES MELLITUS WITH FOOT ULCER (HCC): Status: ACTIVE | Noted: 2025-01-11

## 2025-01-11 PROBLEM — D72.829 LEUKOCYTOSIS: Status: ACTIVE | Noted: 2025-01-11

## 2025-01-11 PROBLEM — R70.0 ELEVATED ERYTHROCYTE SEDIMENTATION RATE: Status: ACTIVE | Noted: 2025-01-11

## 2025-01-11 PROBLEM — F20.0 PARANOID SCHIZOPHRENIA (HCC): Status: ACTIVE | Noted: 2025-01-11

## 2025-01-11 PROBLEM — E11.621 TYPE 2 DIABETES MELLITUS WITH FOOT ULCER (HCC): Status: ACTIVE | Noted: 2025-01-11

## 2025-01-11 PROBLEM — M86.10 ACUTE OSTEOMYELITIS: Status: ACTIVE | Noted: 2025-01-11

## 2025-01-11 PROBLEM — R79.82 ELEVATED C-REACTIVE PROTEIN (CRP): Status: ACTIVE | Noted: 2025-01-11

## 2025-01-11 LAB
ANION GAP SERPL CALCULATED.3IONS-SCNC: 8 MMOL/L (ref 9–16)
BASOPHILS # BLD: 0.2 K/UL (ref 0–0.2)
BASOPHILS NFR BLD: 1 % (ref 0–2)
BUN SERPL-MCNC: 11 MG/DL (ref 6–20)
CALCIUM SERPL-MCNC: 8 MG/DL (ref 8.6–10.4)
CHLORIDE SERPL-SCNC: 108 MMOL/L (ref 98–107)
CO2 SERPL-SCNC: 23 MMOL/L (ref 20–31)
CREAT SERPL-MCNC: 0.7 MG/DL (ref 0.7–1.2)
CRP SERPL HS-MCNC: 81 MG/L (ref 0–5)
DATE LAST DOSE: NORMAL
ECHO BSA: 2.51 M2
EOSINOPHIL # BLD: 0.1 K/UL (ref 0–0.4)
EOSINOPHILS RELATIVE PERCENT: 1 % (ref 0–4)
ERYTHROCYTE [DISTWIDTH] IN BLOOD BY AUTOMATED COUNT: 13.9 % (ref 11.5–14.9)
GFR, ESTIMATED: >90 ML/MIN/1.73M2
GLUCOSE BLD-MCNC: 104 MG/DL (ref 75–110)
GLUCOSE BLD-MCNC: 115 MG/DL (ref 75–110)
GLUCOSE BLD-MCNC: 121 MG/DL (ref 75–110)
GLUCOSE BLD-MCNC: 141 MG/DL (ref 75–110)
GLUCOSE SERPL-MCNC: 143 MG/DL (ref 74–99)
HCT VFR BLD AUTO: 30.2 % (ref 41–53)
HGB BLD-MCNC: 10.1 G/DL (ref 13.5–17.5)
LYMPHOCYTES NFR BLD: 1.2 K/UL (ref 1–4.8)
LYMPHOCYTES RELATIVE PERCENT: 10 % (ref 24–44)
MCH RBC QN AUTO: 29.4 PG (ref 26–34)
MCHC RBC AUTO-ENTMCNC: 33.3 G/DL (ref 31–37)
MCV RBC AUTO: 88.2 FL (ref 80–100)
MICROORGANISM SPEC CULT: NO GROWTH
MONOCYTES NFR BLD: 0.9 K/UL (ref 0.1–1.3)
MONOCYTES NFR BLD: 7 % (ref 1–7)
NEUTROPHILS NFR BLD: 81 % (ref 36–66)
NEUTS SEG NFR BLD: 10 K/UL (ref 1.3–9.1)
PLATELET # BLD AUTO: 295 K/UL (ref 150–450)
PMV BLD AUTO: 7.6 FL (ref 6–12)
POTASSIUM SERPL-SCNC: 3.9 MMOL/L (ref 3.7–5.3)
RBC # BLD AUTO: 3.42 M/UL (ref 4.5–5.9)
SODIUM SERPL-SCNC: 139 MMOL/L (ref 136–145)
SPECIMEN DESCRIPTION: NORMAL
TME LAST DOSE: 2152 H
VANCOMYCIN DOSE: NORMAL MG
VANCOMYCIN SERPL-MCNC: 11.9 UG/ML (ref 5–40)
VAS LEFT ABI: 1.04
VAS LEFT ARM BP: 143 MMHG
VAS LEFT CALF PRESSURE: 150 MMHG
VAS LEFT DORSALIS PEDIS BP: 48 MMHG
VAS LEFT LOW THIGH PRESSURE: 173 MMHG
VAS LEFT PTA BP: 149 MMHG
VAS RIGHT ABI: 1.19
VAS RIGHT ARM BP: 130 MMHG
VAS RIGHT CALF PRESSURE: 163 MMHG
VAS RIGHT DORSALIS PEDIS BP: 164 MMHG
VAS RIGHT LOW THIGH PRESSURE: 188 MMHG
VAS RIGHT PTA BP: 170 MMHG
WBC OTHER # BLD: 12.3 K/UL (ref 3.5–11)

## 2025-01-11 PROCEDURE — 99222 1ST HOSP IP/OBS MODERATE 55: CPT | Performed by: NURSE PRACTITIONER

## 2025-01-11 PROCEDURE — 6360000002 HC RX W HCPCS: Performed by: NURSE PRACTITIONER

## 2025-01-11 PROCEDURE — 97530 THERAPEUTIC ACTIVITIES: CPT

## 2025-01-11 PROCEDURE — 86140 C-REACTIVE PROTEIN: CPT

## 2025-01-11 PROCEDURE — 6370000000 HC RX 637 (ALT 250 FOR IP): Performed by: INTERNAL MEDICINE

## 2025-01-11 PROCEDURE — 82947 ASSAY GLUCOSE BLOOD QUANT: CPT

## 2025-01-11 PROCEDURE — 99233 SBSQ HOSP IP/OBS HIGH 50: CPT | Performed by: INTERNAL MEDICINE

## 2025-01-11 PROCEDURE — 85025 COMPLETE CBC W/AUTO DIFF WBC: CPT

## 2025-01-11 PROCEDURE — 80202 ASSAY OF VANCOMYCIN: CPT

## 2025-01-11 PROCEDURE — 90792 PSYCH DIAG EVAL W/MED SRVCS: CPT | Performed by: PSYCHIATRY & NEUROLOGY

## 2025-01-11 PROCEDURE — 2580000003 HC RX 258: Performed by: NURSE PRACTITIONER

## 2025-01-11 PROCEDURE — 2060000000 HC ICU INTERMEDIATE R&B

## 2025-01-11 PROCEDURE — 99231 SBSQ HOSP IP/OBS SF/LOW 25: CPT | Performed by: STUDENT IN AN ORGANIZED HEALTH CARE EDUCATION/TRAINING PROGRAM

## 2025-01-11 PROCEDURE — 93923 UPR/LXTR ART STDY 3+ LVLS: CPT | Performed by: STUDENT IN AN ORGANIZED HEALTH CARE EDUCATION/TRAINING PROGRAM

## 2025-01-11 PROCEDURE — 6360000002 HC RX W HCPCS: Performed by: EMERGENCY MEDICINE

## 2025-01-11 PROCEDURE — 80048 BASIC METABOLIC PNL TOTAL CA: CPT

## 2025-01-11 PROCEDURE — 97162 PT EVAL MOD COMPLEX 30 MIN: CPT

## 2025-01-11 PROCEDURE — 36415 COLL VENOUS BLD VENIPUNCTURE: CPT

## 2025-01-11 RX ORDER — VANCOMYCIN 1.5 G/300ML
1500 INJECTION, SOLUTION INTRAVENOUS EVERY 12 HOURS
Status: DISCONTINUED | OUTPATIENT
Start: 2025-01-11 | End: 2025-01-11

## 2025-01-11 RX ORDER — CLINDAMYCIN PHOSPHATE 600 MG/50ML
600 INJECTION, SOLUTION INTRAVENOUS EVERY 8 HOURS
Status: DISCONTINUED | OUTPATIENT
Start: 2025-01-11 | End: 2025-01-18

## 2025-01-11 RX ADMIN — PIPERACILLIN AND TAZOBACTAM 3375 MG: 3; .375 INJECTION, POWDER, LYOPHILIZED, FOR SOLUTION INTRAVENOUS at 02:04

## 2025-01-11 RX ADMIN — VANCOMYCIN 1500 MG: 1.5 INJECTION, SOLUTION INTRAVENOUS at 10:39

## 2025-01-11 RX ADMIN — CLINDAMYCIN IN 5 PERCENT DEXTROSE 600 MG: 12 INJECTION, SOLUTION INTRAVENOUS at 18:04

## 2025-01-11 RX ADMIN — PIPERACILLIN AND TAZOBACTAM 3375 MG: 3; .375 INJECTION, POWDER, LYOPHILIZED, FOR SOLUTION INTRAVENOUS at 19:43

## 2025-01-11 RX ADMIN — CLINDAMYCIN IN 5 PERCENT DEXTROSE 600 MG: 12 INJECTION, SOLUTION INTRAVENOUS at 23:50

## 2025-01-11 RX ADMIN — BENZONATATE 100 MG: 100 CAPSULE ORAL at 07:41

## 2025-01-11 RX ADMIN — PIPERACILLIN AND TAZOBACTAM 3375 MG: 3; .375 INJECTION, POWDER, LYOPHILIZED, FOR SOLUTION INTRAVENOUS at 12:25

## 2025-01-11 NOTE — CARE COORDINATION
ONGOING DISCHARGE PLAN:    Review of chart.    Psych consulted for capacity evaluation. This writer attempted to contact the patient's legal guardian, Divya Fierro, sibling in regards to current living situation, DME, etc, voicemail left, awaiting return call.    Podiatry-POD #1 bedside I&D, plan for surgery 1/14/25 for TMA if patient remains stable.    ID-gangrene. IV zosyn, IV vanco.    Vascular-PVD.     PT/OT.     Will continue to follow for additional discharge needs.    If patient is discharged prior to next notation, then this note serves as note for discharge by case management.    Electronically signed by Millicent Zimmerman RN on 1/11/2025 at 3:53 PM

## 2025-01-12 LAB
ANION GAP SERPL CALCULATED.3IONS-SCNC: 11 MMOL/L (ref 9–16)
BASOPHILS # BLD: 0.2 K/UL (ref 0–0.2)
BASOPHILS NFR BLD: 1 % (ref 0–2)
BUN SERPL-MCNC: 9 MG/DL (ref 6–20)
CALCIUM SERPL-MCNC: 8.6 MG/DL (ref 8.6–10.4)
CHLORIDE SERPL-SCNC: 105 MMOL/L (ref 98–107)
CO2 SERPL-SCNC: 23 MMOL/L (ref 20–31)
CREAT SERPL-MCNC: 0.7 MG/DL (ref 0.7–1.2)
CRP SERPL HS-MCNC: 59.6 MG/L (ref 0–5)
EOSINOPHIL # BLD: 0.1 K/UL (ref 0–0.4)
EOSINOPHILS RELATIVE PERCENT: 1 % (ref 0–4)
ERYTHROCYTE [DISTWIDTH] IN BLOOD BY AUTOMATED COUNT: 14 % (ref 11.5–14.9)
GFR, ESTIMATED: >90 ML/MIN/1.73M2
GLUCOSE BLD-MCNC: 119 MG/DL (ref 75–110)
GLUCOSE BLD-MCNC: 122 MG/DL (ref 75–110)
GLUCOSE BLD-MCNC: 132 MG/DL (ref 75–110)
GLUCOSE BLD-MCNC: 171 MG/DL (ref 75–110)
GLUCOSE SERPL-MCNC: 125 MG/DL (ref 74–99)
HCT VFR BLD AUTO: 32.2 % (ref 41–53)
HGB BLD-MCNC: 10.8 G/DL (ref 13.5–17.5)
LYMPHOCYTES NFR BLD: 1.3 K/UL (ref 1–4.8)
LYMPHOCYTES RELATIVE PERCENT: 9 % (ref 24–44)
MCH RBC QN AUTO: 29.4 PG (ref 26–34)
MCHC RBC AUTO-ENTMCNC: 33.5 G/DL (ref 31–37)
MCV RBC AUTO: 88 FL (ref 80–100)
MONOCYTES NFR BLD: 1 K/UL (ref 0.1–1.3)
MONOCYTES NFR BLD: 7 % (ref 1–7)
NEUTROPHILS NFR BLD: 82 % (ref 36–66)
NEUTS SEG NFR BLD: 11.2 K/UL (ref 1.3–9.1)
PLATELET # BLD AUTO: 308 K/UL (ref 150–450)
PMV BLD AUTO: 7.6 FL (ref 6–12)
POTASSIUM SERPL-SCNC: 3.8 MMOL/L (ref 3.7–5.3)
RBC # BLD AUTO: 3.66 M/UL (ref 4.5–5.9)
SODIUM SERPL-SCNC: 139 MMOL/L (ref 136–145)
WBC OTHER # BLD: 13.7 K/UL (ref 3.5–11)

## 2025-01-12 PROCEDURE — 85025 COMPLETE CBC W/AUTO DIFF WBC: CPT

## 2025-01-12 PROCEDURE — 6360000002 HC RX W HCPCS: Performed by: NURSE PRACTITIONER

## 2025-01-12 PROCEDURE — 2060000000 HC ICU INTERMEDIATE R&B

## 2025-01-12 PROCEDURE — 99232 SBSQ HOSP IP/OBS MODERATE 35: CPT | Performed by: NURSE PRACTITIONER

## 2025-01-12 PROCEDURE — 2500000003 HC RX 250 WO HCPCS: Performed by: NURSE PRACTITIONER

## 2025-01-12 PROCEDURE — 99233 SBSQ HOSP IP/OBS HIGH 50: CPT | Performed by: INTERNAL MEDICINE

## 2025-01-12 PROCEDURE — 36415 COLL VENOUS BLD VENIPUNCTURE: CPT

## 2025-01-12 PROCEDURE — 2580000003 HC RX 258: Performed by: NURSE PRACTITIONER

## 2025-01-12 PROCEDURE — 86140 C-REACTIVE PROTEIN: CPT

## 2025-01-12 PROCEDURE — 80048 BASIC METABOLIC PNL TOTAL CA: CPT

## 2025-01-12 PROCEDURE — 82947 ASSAY GLUCOSE BLOOD QUANT: CPT

## 2025-01-12 PROCEDURE — 6370000000 HC RX 637 (ALT 250 FOR IP): Performed by: INTERNAL MEDICINE

## 2025-01-12 RX ORDER — LISINOPRIL 20 MG/1
20 TABLET ORAL DAILY
Status: DISCONTINUED | OUTPATIENT
Start: 2025-01-12 | End: 2025-01-13

## 2025-01-12 RX ADMIN — PIPERACILLIN AND TAZOBACTAM 3375 MG: 3; .375 INJECTION, POWDER, LYOPHILIZED, FOR SOLUTION INTRAVENOUS at 10:46

## 2025-01-12 RX ADMIN — PIPERACILLIN AND TAZOBACTAM 3375 MG: 3; .375 INJECTION, POWDER, LYOPHILIZED, FOR SOLUTION INTRAVENOUS at 04:08

## 2025-01-12 RX ADMIN — LISINOPRIL 20 MG: 20 TABLET ORAL at 13:18

## 2025-01-12 RX ADMIN — CLINDAMYCIN IN 5 PERCENT DEXTROSE 600 MG: 12 INJECTION, SOLUTION INTRAVENOUS at 08:24

## 2025-01-12 RX ADMIN — CLINDAMYCIN IN 5 PERCENT DEXTROSE 600 MG: 12 INJECTION, SOLUTION INTRAVENOUS at 16:49

## 2025-01-12 RX ADMIN — SODIUM CHLORIDE, PRESERVATIVE FREE 10 ML: 5 INJECTION INTRAVENOUS at 08:25

## 2025-01-12 RX ADMIN — PIPERACILLIN AND TAZOBACTAM 3375 MG: 3; .375 INJECTION, POWDER, LYOPHILIZED, FOR SOLUTION INTRAVENOUS at 18:20

## 2025-01-12 RX ADMIN — BENZONATATE 100 MG: 100 CAPSULE ORAL at 17:16

## 2025-01-12 RX ADMIN — ENOXAPARIN SODIUM 40 MG: 100 INJECTION SUBCUTANEOUS at 08:25

## 2025-01-12 NOTE — CARE COORDINATION
DISCHARGE PLANNING NOTE:    Review of medical record.    Patient has a legal guardian, Divya Fierro. This writer attempted to contact Divya on 1/11/25 (voicemail left), as well as 1/12/25 (voicemail left), with no return call.    This writer contacted the patient's other emergency contact, Jude White at the number provided, no answer, voicemail left.    Per psych the patient lacks capacity to make decisions, therefore it is imperative to get in contact with legal guardian.     Upon review of the patient's prior medical record it appears his legal last name is Baldomero, he goes by David due to his mother remarrying.     Will update Meron Holguin RN, to further investigate.    Electronically signed by Millicent Zimmerman RN on 1/12/2025 at 12:02 PM

## 2025-01-13 LAB
BASOPHILS # BLD: 0.2 K/UL (ref 0–0.2)
BASOPHILS NFR BLD: 1 % (ref 0–2)
CRP SERPL HS-MCNC: 56.7 MG/L (ref 0–5)
EOSINOPHIL # BLD: 0.2 K/UL (ref 0–0.4)
EOSINOPHILS RELATIVE PERCENT: 1 % (ref 0–4)
ERYTHROCYTE [DISTWIDTH] IN BLOOD BY AUTOMATED COUNT: 14 % (ref 11.5–14.9)
ERYTHROCYTE [SEDIMENTATION RATE] IN BLOOD BY PHOTOMETRIC METHOD: 31 MM/HR (ref 0–20)
GLUCOSE BLD-MCNC: 134 MG/DL (ref 75–110)
GLUCOSE BLD-MCNC: 134 MG/DL (ref 75–110)
GLUCOSE BLD-MCNC: 149 MG/DL (ref 75–110)
GLUCOSE BLD-MCNC: 150 MG/DL (ref 75–110)
HCT VFR BLD AUTO: 31.9 % (ref 41–53)
HGB BLD-MCNC: 10.6 G/DL (ref 13.5–17.5)
LYMPHOCYTES NFR BLD: 1.4 K/UL (ref 1–4.8)
LYMPHOCYTES RELATIVE PERCENT: 10 % (ref 24–44)
MCH RBC QN AUTO: 29.1 PG (ref 26–34)
MCHC RBC AUTO-ENTMCNC: 33.3 G/DL (ref 31–37)
MCV RBC AUTO: 87.4 FL (ref 80–100)
MONOCYTES NFR BLD: 1.1 K/UL (ref 0.1–1.3)
MONOCYTES NFR BLD: 8 % (ref 1–7)
NEUTROPHILS NFR BLD: 80 % (ref 36–66)
NEUTS SEG NFR BLD: 10.7 K/UL (ref 1.3–9.1)
PLATELET # BLD AUTO: 329 K/UL (ref 150–450)
PMV BLD AUTO: 8.1 FL (ref 6–12)
RBC # BLD AUTO: 3.65 M/UL (ref 4.5–5.9)
WBC OTHER # BLD: 13.6 K/UL (ref 3.5–11)

## 2025-01-13 PROCEDURE — 97116 GAIT TRAINING THERAPY: CPT

## 2025-01-13 PROCEDURE — 99231 SBSQ HOSP IP/OBS SF/LOW 25: CPT | Performed by: STUDENT IN AN ORGANIZED HEALTH CARE EDUCATION/TRAINING PROGRAM

## 2025-01-13 PROCEDURE — 6360000002 HC RX W HCPCS: Performed by: NURSE PRACTITIONER

## 2025-01-13 PROCEDURE — 6370000000 HC RX 637 (ALT 250 FOR IP): Performed by: INTERNAL MEDICINE

## 2025-01-13 PROCEDURE — 99233 SBSQ HOSP IP/OBS HIGH 50: CPT | Performed by: INTERNAL MEDICINE

## 2025-01-13 PROCEDURE — 82947 ASSAY GLUCOSE BLOOD QUANT: CPT

## 2025-01-13 PROCEDURE — 2500000003 HC RX 250 WO HCPCS: Performed by: NURSE PRACTITIONER

## 2025-01-13 PROCEDURE — 36415 COLL VENOUS BLD VENIPUNCTURE: CPT

## 2025-01-13 PROCEDURE — 2580000003 HC RX 258: Performed by: INTERNAL MEDICINE

## 2025-01-13 PROCEDURE — 1200000000 HC SEMI PRIVATE

## 2025-01-13 PROCEDURE — 85652 RBC SED RATE AUTOMATED: CPT

## 2025-01-13 PROCEDURE — 2580000003 HC RX 258: Performed by: NURSE PRACTITIONER

## 2025-01-13 PROCEDURE — 86140 C-REACTIVE PROTEIN: CPT

## 2025-01-13 PROCEDURE — 6360000002 HC RX W HCPCS: Performed by: INTERNAL MEDICINE

## 2025-01-13 PROCEDURE — 85025 COMPLETE CBC W/AUTO DIFF WBC: CPT

## 2025-01-13 RX ORDER — LISINOPRIL 20 MG/1
20 TABLET ORAL 2 TIMES DAILY
Status: DISCONTINUED | OUTPATIENT
Start: 2025-01-13 | End: 2025-01-20

## 2025-01-13 RX ADMIN — SODIUM CHLORIDE, PRESERVATIVE FREE 10 ML: 5 INJECTION INTRAVENOUS at 21:51

## 2025-01-13 RX ADMIN — CLINDAMYCIN IN 5 PERCENT DEXTROSE 600 MG: 12 INJECTION, SOLUTION INTRAVENOUS at 01:29

## 2025-01-13 RX ADMIN — CLINDAMYCIN IN 5 PERCENT DEXTROSE 600 MG: 12 INJECTION, SOLUTION INTRAVENOUS at 07:49

## 2025-01-13 RX ADMIN — LISINOPRIL 20 MG: 20 TABLET ORAL at 21:52

## 2025-01-13 RX ADMIN — MEROPENEM 1000 MG: 1 INJECTION INTRAVENOUS at 18:30

## 2025-01-13 RX ADMIN — PIPERACILLIN AND TAZOBACTAM 3375 MG: 3; .375 INJECTION, POWDER, LYOPHILIZED, FOR SOLUTION INTRAVENOUS at 09:54

## 2025-01-13 RX ADMIN — LISINOPRIL 20 MG: 20 TABLET ORAL at 07:47

## 2025-01-13 RX ADMIN — PIPERACILLIN AND TAZOBACTAM 3375 MG: 3; .375 INJECTION, POWDER, LYOPHILIZED, FOR SOLUTION INTRAVENOUS at 03:30

## 2025-01-13 RX ADMIN — SODIUM CHLORIDE: 9 INJECTION, SOLUTION INTRAVENOUS at 01:24

## 2025-01-13 RX ADMIN — CLINDAMYCIN IN 5 PERCENT DEXTROSE 600 MG: 12 INJECTION, SOLUTION INTRAVENOUS at 17:03

## 2025-01-13 RX ADMIN — ENOXAPARIN SODIUM 40 MG: 100 INJECTION SUBCUTANEOUS at 07:47

## 2025-01-13 ASSESSMENT — PAIN SCALES - GENERAL
PAINLEVEL_OUTOF10: 3
PAINLEVEL_OUTOF10: 0
PAINLEVEL_OUTOF10: 0

## 2025-01-13 ASSESSMENT — PAIN DESCRIPTION - PAIN TYPE: TYPE: ACUTE PAIN

## 2025-01-13 ASSESSMENT — PAIN DESCRIPTION - ONSET: ONSET: ON-GOING

## 2025-01-13 ASSESSMENT — PAIN DESCRIPTION - FREQUENCY: FREQUENCY: CONTINUOUS

## 2025-01-13 ASSESSMENT — PAIN DESCRIPTION - DESCRIPTORS: DESCRIPTORS: ACHING;DISCOMFORT

## 2025-01-13 ASSESSMENT — PAIN DESCRIPTION - LOCATION: LOCATION: FOOT

## 2025-01-13 ASSESSMENT — PAIN - FUNCTIONAL ASSESSMENT: PAIN_FUNCTIONAL_ASSESSMENT: ACTIVITIES ARE NOT PREVENTED

## 2025-01-13 ASSESSMENT — PAIN DESCRIPTION - ORIENTATION: ORIENTATION: RIGHT

## 2025-01-13 NOTE — CARE COORDINATION
Writer called Divya Crewssey patients guardian and left a voice mail message to call the Nursing unit as the Bedside RN needs to talk with her.    Electronically signed by Meron Holguin RN on 1/13/2025 at 9:14 AM

## 2025-01-13 NOTE — FLOWSHEET NOTE
01/13/25 1215 01/13/25 1230   Vital Signs   BP (!) 174/110  (will recheck) (!) 153/111  (recheck)   MAP (Calculated) 131 125     Dr Agosto notified bp was 170s and recheck was 153/111. Reviewed he received lisinopril this am and is ordered 20 mg BID. He stated no new orders for now as he will receive another dose of lisinopril tonight. Ok for med c transfer with no tele.

## 2025-01-13 NOTE — CARE COORDINATION
DISCHARGE PLANNING NOTE:    [facility] NOT have a legal [facility]. Guardianship process to be initiated as patient currently lacks capacity for decision making per Psychiatry. [place] [employer] to attempt to locate any family.

## 2025-01-14 LAB
BASOPHILS # BLD: 0.1 K/UL (ref 0–0.2)
BASOPHILS NFR BLD: 1 % (ref 0–2)
CRP SERPL HS-MCNC: 58.5 MG/L (ref 0–5)
EKG ATRIAL RATE: 104 BPM
EKG P AXIS: 37 DEGREES
EKG P-R INTERVAL: 114 MS
EKG Q-T INTERVAL: 342 MS
EKG QRS DURATION: 74 MS
EKG QTC CALCULATION (BAZETT): 449 MS
EKG R AXIS: 49 DEGREES
EKG T AXIS: 62 DEGREES
EKG VENTRICULAR RATE: 104 BPM
EOSINOPHIL # BLD: 0.1 K/UL (ref 0–0.4)
EOSINOPHILS RELATIVE PERCENT: 1 % (ref 0–4)
ERYTHROCYTE [DISTWIDTH] IN BLOOD BY AUTOMATED COUNT: 13.9 % (ref 11.5–14.9)
ERYTHROCYTE [SEDIMENTATION RATE] IN BLOOD BY PHOTOMETRIC METHOD: 38 MM/HR (ref 0–20)
GLUCOSE BLD-MCNC: 113 MG/DL (ref 75–110)
GLUCOSE BLD-MCNC: 132 MG/DL (ref 75–110)
GLUCOSE BLD-MCNC: 142 MG/DL (ref 75–110)
GLUCOSE BLD-MCNC: 174 MG/DL (ref 75–110)
HCT VFR BLD AUTO: 31.3 % (ref 41–53)
HGB BLD-MCNC: 10.5 G/DL (ref 13.5–17.5)
LYMPHOCYTES NFR BLD: 1.4 K/UL (ref 1–4.8)
LYMPHOCYTES RELATIVE PERCENT: 10 % (ref 24–44)
MCH RBC QN AUTO: 29.7 PG (ref 26–34)
MCHC RBC AUTO-ENTMCNC: 33.6 G/DL (ref 31–37)
MCV RBC AUTO: 88.4 FL (ref 80–100)
MICROORGANISM SPEC CULT: ABNORMAL
MICROORGANISM SPEC CULT: NORMAL
MICROORGANISM SPEC CULT: NORMAL
MICROORGANISM/AGENT SPEC: ABNORMAL
MICROORGANISM/AGENT SPEC: ABNORMAL
MONOCYTES NFR BLD: 1.3 K/UL (ref 0.1–1.3)
MONOCYTES NFR BLD: 9 % (ref 1–7)
NEUTROPHILS NFR BLD: 79 % (ref 36–66)
NEUTS SEG NFR BLD: 10.7 K/UL (ref 1.3–9.1)
PLATELET # BLD AUTO: 319 K/UL (ref 150–450)
PMV BLD AUTO: 8.1 FL (ref 6–12)
RBC # BLD AUTO: 3.53 M/UL (ref 4.5–5.9)
SERVICE CMNT-IMP: ABNORMAL
SERVICE CMNT-IMP: NORMAL
SERVICE CMNT-IMP: NORMAL
SPECIMEN DESCRIPTION: ABNORMAL
SPECIMEN DESCRIPTION: NORMAL
SPECIMEN DESCRIPTION: NORMAL
WBC OTHER # BLD: 13.6 K/UL (ref 3.5–11)

## 2025-01-14 PROCEDURE — 97535 SELF CARE MNGMENT TRAINING: CPT

## 2025-01-14 PROCEDURE — 6370000000 HC RX 637 (ALT 250 FOR IP): Performed by: PSYCHIATRY & NEUROLOGY

## 2025-01-14 PROCEDURE — 1200000000 HC SEMI PRIVATE

## 2025-01-14 PROCEDURE — 36415 COLL VENOUS BLD VENIPUNCTURE: CPT

## 2025-01-14 PROCEDURE — 6360000002 HC RX W HCPCS: Performed by: NURSE PRACTITIONER

## 2025-01-14 PROCEDURE — 97166 OT EVAL MOD COMPLEX 45 MIN: CPT

## 2025-01-14 PROCEDURE — 86140 C-REACTIVE PROTEIN: CPT

## 2025-01-14 PROCEDURE — 6360000002 HC RX W HCPCS: Performed by: INTERNAL MEDICINE

## 2025-01-14 PROCEDURE — 6370000000 HC RX 637 (ALT 250 FOR IP): Performed by: INTERNAL MEDICINE

## 2025-01-14 PROCEDURE — 99233 SBSQ HOSP IP/OBS HIGH 50: CPT | Performed by: INTERNAL MEDICINE

## 2025-01-14 PROCEDURE — 93010 ELECTROCARDIOGRAM REPORT: CPT | Performed by: INTERNAL MEDICINE

## 2025-01-14 PROCEDURE — 6370000000 HC RX 637 (ALT 250 FOR IP): Performed by: NURSE PRACTITIONER

## 2025-01-14 PROCEDURE — 2500000003 HC RX 250 WO HCPCS: Performed by: NURSE PRACTITIONER

## 2025-01-14 PROCEDURE — 85652 RBC SED RATE AUTOMATED: CPT

## 2025-01-14 PROCEDURE — 2580000003 HC RX 258: Performed by: INTERNAL MEDICINE

## 2025-01-14 PROCEDURE — 82947 ASSAY GLUCOSE BLOOD QUANT: CPT

## 2025-01-14 PROCEDURE — 85025 COMPLETE CBC W/AUTO DIFF WBC: CPT

## 2025-01-14 PROCEDURE — 97530 THERAPEUTIC ACTIVITIES: CPT

## 2025-01-14 RX ORDER — RISPERIDONE 1 MG/1
1 TABLET ORAL 2 TIMES DAILY
Status: DISCONTINUED | OUTPATIENT
Start: 2025-01-14 | End: 2025-01-20 | Stop reason: HOSPADM

## 2025-01-14 RX ADMIN — CLINDAMYCIN IN 5 PERCENT DEXTROSE 600 MG: 12 INJECTION, SOLUTION INTRAVENOUS at 15:42

## 2025-01-14 RX ADMIN — LISINOPRIL 20 MG: 20 TABLET ORAL at 10:20

## 2025-01-14 RX ADMIN — MEROPENEM 1000 MG: 1 INJECTION INTRAVENOUS at 17:55

## 2025-01-14 RX ADMIN — ACETAMINOPHEN 650 MG: 325 TABLET ORAL at 15:41

## 2025-01-14 RX ADMIN — CLINDAMYCIN IN 5 PERCENT DEXTROSE 600 MG: 12 INJECTION, SOLUTION INTRAVENOUS at 00:28

## 2025-01-14 RX ADMIN — MEROPENEM 1000 MG: 1 INJECTION INTRAVENOUS at 02:31

## 2025-01-14 RX ADMIN — MEROPENEM 1000 MG: 1 INJECTION INTRAVENOUS at 10:32

## 2025-01-14 RX ADMIN — ENOXAPARIN SODIUM 40 MG: 100 INJECTION SUBCUTANEOUS at 10:26

## 2025-01-14 RX ADMIN — SODIUM CHLORIDE, PRESERVATIVE FREE 10 ML: 5 INJECTION INTRAVENOUS at 10:30

## 2025-01-14 RX ADMIN — LISINOPRIL 20 MG: 20 TABLET ORAL at 21:21

## 2025-01-14 RX ADMIN — RISPERIDONE 1 MG: 1 TABLET, FILM COATED ORAL at 21:21

## 2025-01-14 ASSESSMENT — PAIN SCALES - GENERAL: PAINLEVEL_OUTOF10: 6

## 2025-01-14 NOTE — DISCHARGE INSTR - COC
Piggyback:150]  Out: 1480 [Urine:1480]    Safety Concerns:     At Risk for Falls    Impairments/Disabilities:      None    Nutrition Therapy:  Current Nutrition Therapy:   - Oral Diet:  General    Routes of Feeding: Oral  Liquids: No Restrictions  Daily Fluid Restriction: no  Last Modified Barium Swallow with Video (Video Swallowing Test): not done    Treatments at the Time of Hospital Discharge:   Respiratory Treatments: ***  Oxygen Therapy:  is not on home oxygen therapy.  Ventilator:    - No ventilator support    Rehab Therapies: Physical Therapy and Occupational Therapy  Weight Bearing Status/Restrictions: Non-weight bearing on left leg  Other Medical Equipment (for information only, NOT a DME order):  walker  Other Treatments: Skilled Nursing assessment and monitoring. Medication education and monitoring per protocol.     Patient's personal belongings (please select all that are sent with patient):  None    RN SIGNATURE:  Electronically signed by Omaira Casper RN on 1/20/25 at 7:50 PM EST    CASE MANAGEMENT/SOCIAL WORK SECTION    Inpatient Status Date: 1/9/25    Readmission Risk Assessment Score:  [facility] RISK OF UNPLANNED READMISSION 2.0             12.1 Total Score        Discharging to [place] [address]  Phone: [phone]  Fax: [phone]    Dialysis Facility (if applicable)   Name:  Address:  Dialysis Schedule:  Phone:  Fax:    / signature: Electronically signed by ELLEN Ramon on 1/20/25 at 5:08 PM EST    PHYSICIAN SECTION    Prognosis: Good    Condition at Discharge: Stable    Rehab Potential (if transferring to Rehab): Good    Recommended Labs or Other Treatments After Discharge:     Physician Certification: I certify the above information and transfer of Price Hernandez  is necessary for the continuing treatment of the diagnosis listed and that he requires LTAC for less 30 days.     Update

## 2025-01-14 NOTE — CARE COORDINATION
Writer notified by Dr martinez to place a order fro palliative care consult.  To discuss Code status and plan of care with Next of kin. Patient lacks capacity to make decisions for himself.    Electronically signed by Meron Holguin RN on 1/14/2025 at 11:04 AM

## 2025-01-14 NOTE — CARE COORDINATION
Writer was at bedside with Son Katelyn, Dr Lynne and CLin lead Bala.  Katelyn had brother Neal on the phone with speaker on.  Dr Lynne explained patients admission issues and diagnosis and explained need fro Amputation.  Both sons were agreeable to this.   Myself , Bala and Dr Lynne witness to this.  Herbert sons numbers were added to White board on wall and explained that podiatry and Vascular will reach out to you for consult once plan is arranged.  Katelyn mentioned that he has been trying to get custody of his father for a few years as he is homeless.    Katelyn will need guardianship paperwork so he can help with finances for possible long term care.     Electronically signed by Meron Holguin RN on 1/14/2025 at 1:59 PM

## 2025-01-14 NOTE — CARE COORDINATION
DISCHARGE PLANNING NOTE:    Writer is following for potential discharge placement. Writer attempted to meet with pt family in room, no family present. Pt states his son left not long ago and will be returning. Writer updated RN CM lead Ramya that pt son is not present at this time.    Electronically signed by ELLEN Ramon on 1/14/2025 at 11:54 AM

## 2025-01-15 ENCOUNTER — APPOINTMENT (OUTPATIENT)
Dept: GENERAL RADIOLOGY | Age: 60
DRG: 853 | End: 2025-01-15
Attending: INTERNAL MEDICINE
Payer: MEDICARE

## 2025-01-15 PROBLEM — E11.628 TYPE 2 DIABETES MELLITUS WITH LEFT DIABETIC FOOT INFECTION (HCC): Status: ACTIVE | Noted: 2025-01-15

## 2025-01-15 PROBLEM — I96 GANGRENE OF TOE OF LEFT FOOT (HCC): Status: ACTIVE | Noted: 2025-01-15

## 2025-01-15 PROBLEM — I73.9 PERIPHERAL VASCULAR DISEASE (HCC): Status: ACTIVE | Noted: 2025-01-15

## 2025-01-15 PROBLEM — L97.523 ULCERATED, FOOT, LEFT, WITH NECROSIS OF MUSCLE (HCC): Status: ACTIVE | Noted: 2025-01-15

## 2025-01-15 PROBLEM — L08.9 TYPE 2 DIABETES MELLITUS WITH LEFT DIABETIC FOOT INFECTION (HCC): Status: ACTIVE | Noted: 2025-01-15

## 2025-01-15 LAB
B PARAP IS1001 DNA NPH QL NAA+NON-PROBE: NOT DETECTED
B PERT DNA SPEC QL NAA+PROBE: NOT DETECTED
BASOPHILS # BLD: 0.1 K/UL (ref 0–0.2)
BASOPHILS NFR BLD: 1 % (ref 0–2)
BNP SERPL-MCNC: 2419 PG/ML (ref 0–300)
C PNEUM DNA NPH QL NAA+NON-PROBE: NOT DETECTED
CRP SERPL HS-MCNC: 43.7 MG/L (ref 0–5)
EOSINOPHIL # BLD: 0.2 K/UL (ref 0–0.4)
EOSINOPHILS RELATIVE PERCENT: 2 % (ref 0–4)
ERYTHROCYTE [DISTWIDTH] IN BLOOD BY AUTOMATED COUNT: 14.3 % (ref 11.5–14.9)
ERYTHROCYTE [SEDIMENTATION RATE] IN BLOOD BY PHOTOMETRIC METHOD: 34 MM/HR (ref 0–20)
FLUAV RNA NPH QL NAA+NON-PROBE: NOT DETECTED
FLUBV RNA NPH QL NAA+NON-PROBE: NOT DETECTED
GLUCOSE BLD-MCNC: 105 MG/DL (ref 75–110)
GLUCOSE BLD-MCNC: 125 MG/DL (ref 75–110)
GLUCOSE BLD-MCNC: 145 MG/DL (ref 75–110)
GLUCOSE BLD-MCNC: 148 MG/DL (ref 75–110)
HADV DNA NPH QL NAA+NON-PROBE: NOT DETECTED
HCOV 229E RNA NPH QL NAA+NON-PROBE: NOT DETECTED
HCOV HKU1 RNA NPH QL NAA+NON-PROBE: NOT DETECTED
HCOV NL63 RNA NPH QL NAA+NON-PROBE: NOT DETECTED
HCOV OC43 RNA NPH QL NAA+NON-PROBE: NOT DETECTED
HCT VFR BLD AUTO: 30.5 % (ref 41–53)
HGB BLD-MCNC: 10.3 G/DL (ref 13.5–17.5)
HMPV RNA NPH QL NAA+NON-PROBE: NOT DETECTED
HPIV1 RNA NPH QL NAA+NON-PROBE: NOT DETECTED
HPIV2 RNA NPH QL NAA+NON-PROBE: NOT DETECTED
HPIV3 RNA NPH QL NAA+NON-PROBE: NOT DETECTED
HPIV4 RNA NPH QL NAA+NON-PROBE: NOT DETECTED
LYMPHOCYTES NFR BLD: 1.2 K/UL (ref 1–4.8)
LYMPHOCYTES RELATIVE PERCENT: 10 % (ref 24–44)
M PNEUMO DNA NPH QL NAA+NON-PROBE: NOT DETECTED
MCH RBC QN AUTO: 29.6 PG (ref 26–34)
MCHC RBC AUTO-ENTMCNC: 33.8 G/DL (ref 31–37)
MCV RBC AUTO: 87.6 FL (ref 80–100)
MONOCYTES NFR BLD: 0.9 K/UL (ref 0.1–1.3)
MONOCYTES NFR BLD: 8 % (ref 1–7)
NEUTROPHILS NFR BLD: 79 % (ref 36–66)
NEUTS SEG NFR BLD: 9 K/UL (ref 1.3–9.1)
PLATELET # BLD AUTO: 300 K/UL (ref 150–450)
PMV BLD AUTO: 7.3 FL (ref 6–12)
RBC # BLD AUTO: 3.49 M/UL (ref 4.5–5.9)
RSV RNA NPH QL NAA+NON-PROBE: NOT DETECTED
RV+EV RNA NPH QL NAA+NON-PROBE: NOT DETECTED
SARS-COV-2 RNA NPH QL NAA+NON-PROBE: NOT DETECTED
SPECIMEN DESCRIPTION: NORMAL
WBC OTHER # BLD: 11.3 K/UL (ref 3.5–11)

## 2025-01-15 PROCEDURE — 71045 X-RAY EXAM CHEST 1 VIEW: CPT

## 2025-01-15 PROCEDURE — 6370000000 HC RX 637 (ALT 250 FOR IP): Performed by: NURSE PRACTITIONER

## 2025-01-15 PROCEDURE — 2580000003 HC RX 258: Performed by: NURSE PRACTITIONER

## 2025-01-15 PROCEDURE — 6370000000 HC RX 637 (ALT 250 FOR IP): Performed by: INTERNAL MEDICINE

## 2025-01-15 PROCEDURE — 6360000002 HC RX W HCPCS: Performed by: NURSE PRACTITIONER

## 2025-01-15 PROCEDURE — 82947 ASSAY GLUCOSE BLOOD QUANT: CPT

## 2025-01-15 PROCEDURE — 99233 SBSQ HOSP IP/OBS HIGH 50: CPT | Performed by: INTERNAL MEDICINE

## 2025-01-15 PROCEDURE — 2580000003 HC RX 258: Performed by: INTERNAL MEDICINE

## 2025-01-15 PROCEDURE — 99223 1ST HOSP IP/OBS HIGH 75: CPT | Performed by: NURSE PRACTITIONER

## 2025-01-15 PROCEDURE — 99232 SBSQ HOSP IP/OBS MODERATE 35: CPT | Performed by: PODIATRIST

## 2025-01-15 PROCEDURE — 83880 ASSAY OF NATRIURETIC PEPTIDE: CPT

## 2025-01-15 PROCEDURE — 6370000000 HC RX 637 (ALT 250 FOR IP): Performed by: PSYCHIATRY & NEUROLOGY

## 2025-01-15 PROCEDURE — 85652 RBC SED RATE AUTOMATED: CPT

## 2025-01-15 PROCEDURE — 86140 C-REACTIVE PROTEIN: CPT

## 2025-01-15 PROCEDURE — 36415 COLL VENOUS BLD VENIPUNCTURE: CPT

## 2025-01-15 PROCEDURE — 2500000003 HC RX 250 WO HCPCS: Performed by: NURSE PRACTITIONER

## 2025-01-15 PROCEDURE — 1200000000 HC SEMI PRIVATE

## 2025-01-15 PROCEDURE — 85025 COMPLETE CBC W/AUTO DIFF WBC: CPT

## 2025-01-15 PROCEDURE — 87641 MR-STAPH DNA AMP PROBE: CPT

## 2025-01-15 PROCEDURE — 0202U NFCT DS 22 TRGT SARS-COV-2: CPT

## 2025-01-15 PROCEDURE — 6360000002 HC RX W HCPCS: Performed by: INTERNAL MEDICINE

## 2025-01-15 RX ORDER — METOPROLOL TARTRATE 25 MG/1
25 TABLET, FILM COATED ORAL 2 TIMES DAILY
Status: DISCONTINUED | OUTPATIENT
Start: 2025-01-15 | End: 2025-01-20

## 2025-01-15 RX ADMIN — METOPROLOL TARTRATE 25 MG: 25 TABLET, FILM COATED ORAL at 21:12

## 2025-01-15 RX ADMIN — METOPROLOL TARTRATE 25 MG: 25 TABLET, FILM COATED ORAL at 13:18

## 2025-01-15 RX ADMIN — MEROPENEM 1000 MG: 1 INJECTION INTRAVENOUS at 02:41

## 2025-01-15 RX ADMIN — ACETAMINOPHEN 650 MG: 325 TABLET ORAL at 08:59

## 2025-01-15 RX ADMIN — SODIUM CHLORIDE 10 ML/HR: 9 INJECTION, SOLUTION INTRAVENOUS at 18:01

## 2025-01-15 RX ADMIN — ENOXAPARIN SODIUM 40 MG: 100 INJECTION SUBCUTANEOUS at 09:02

## 2025-01-15 RX ADMIN — LISINOPRIL 20 MG: 20 TABLET ORAL at 09:01

## 2025-01-15 RX ADMIN — RISPERIDONE 1 MG: 1 TABLET, FILM COATED ORAL at 21:12

## 2025-01-15 RX ADMIN — RISPERIDONE 1 MG: 1 TABLET, FILM COATED ORAL at 09:06

## 2025-01-15 RX ADMIN — CLINDAMYCIN IN 5 PERCENT DEXTROSE 600 MG: 12 INJECTION, SOLUTION INTRAVENOUS at 09:15

## 2025-01-15 RX ADMIN — MEROPENEM 1000 MG: 1 INJECTION INTRAVENOUS at 10:08

## 2025-01-15 RX ADMIN — SODIUM CHLORIDE: 9 INJECTION, SOLUTION INTRAVENOUS at 10:05

## 2025-01-15 RX ADMIN — SODIUM CHLORIDE, PRESERVATIVE FREE 10 ML: 5 INJECTION INTRAVENOUS at 13:22

## 2025-01-15 RX ADMIN — LISINOPRIL 20 MG: 20 TABLET ORAL at 21:11

## 2025-01-15 RX ADMIN — CLINDAMYCIN IN 5 PERCENT DEXTROSE 600 MG: 12 INJECTION, SOLUTION INTRAVENOUS at 18:02

## 2025-01-15 RX ADMIN — MEROPENEM 1000 MG: 1 INJECTION INTRAVENOUS at 18:41

## 2025-01-15 RX ADMIN — CLINDAMYCIN IN 5 PERCENT DEXTROSE 600 MG: 12 INJECTION, SOLUTION INTRAVENOUS at 00:43

## 2025-01-15 ASSESSMENT — PAIN SCALES - GENERAL
PAINLEVEL_OUTOF10: 1
PAINLEVEL_OUTOF10: 0

## 2025-01-15 ASSESSMENT — PAIN DESCRIPTION - ORIENTATION: ORIENTATION: LEFT

## 2025-01-15 ASSESSMENT — PAIN DESCRIPTION - LOCATION: LOCATION: FOOT

## 2025-01-15 NOTE — CARE COORDINATION
DISCHARGE PLANNING NOTE:    LSW following for potential discharge placement. Patient may need SNF stay following possible amputation. Sons Laurar and Law are patients legal next of kin as patient does not have capacity to make decisions. Katelyn is interested in guardianship, papers to be filled out by MD.

## 2025-01-15 NOTE — ACP (ADVANCE CARE PLANNING)
.Advance Care Planning      Palliative Medicine Provider (MD/NP)  Advance Care Planning (ACP) Conversation      Date of Conversation: 01/15/25  The patient and/or authorized decision maker consented to a voluntary Advance Care Planning conversation.   Individuals present for the conversation:   Mac Zepeda    Legal Healthcare Agent(s):    Primary Decision Maker: Katelyn Hernandez - Child - [phone]    Primary Decision Maker: Joyce Hernandez - Child - [phone]    ACP documents available in EMR prior to discussion:  -None    Primary Palliative Diagnosis(es):  PVD    Conversation Summary: I discussed patients condition, goals and code status. Son is working on Guardianship and placement. He reports wanting aggressive treatment and patient to remain a full code.       Resuscitation Status:    Code Status: Full Code    Outcomes / Completed Documentation:  An explanation of advance directives and their importance was provided and the following forms completed:    -No new documents completed.    If new document completed, original was provided to patient and/or family member.    Copy was placed for scanning into the [facility] EMR.      I spent 8 minutes providing separately identifiable ACP services with the patient and/or surrogate decision maker in a voluntary, in-person conversation discussing the patient's wishes and goals as detailed in the above note.       LI FRAZIER, APRN - CNP          97.5

## 2025-01-15 NOTE — CARE COORDINATION
Writer sent a perfect serve message for Dr Wicho Atkinson to see what the Treatment plan is and if they can call son Katelyn to get consent for treatment.    Electronically signed by Meron Holguin RN on 1/15/2025 at 2:07 PM     Response from Perfect serve from Dr Wicho Atkinson:    I talked with the son already. I told him I would give him a call this afternoon. We are going to try to get the patient on for Friday morning, but I have to speak with the OR. I will update when I have an answer.     Electronically signed by Meron Holguin RN on 1/15/2025 at 2:09 PM

## 2025-01-15 NOTE — CARE COORDINATION
At this time, patient meets LTAC criteria.    Electronically signed by Meron Holguin RN on 1/15/2025 at 2:02 PM    The oval mass in the right breast is benign. Return to annual mammogram screening schedule is recommended

## 2025-01-16 ENCOUNTER — APPOINTMENT (OUTPATIENT)
Age: 60
DRG: 853 | End: 2025-01-16
Attending: INTERNAL MEDICINE
Payer: MEDICARE

## 2025-01-16 ENCOUNTER — ANESTHESIA EVENT (OUTPATIENT)
Dept: OPERATING ROOM | Age: 60
End: 2025-01-16
Payer: MEDICARE

## 2025-01-16 LAB
BASOPHILS # BLD: 0.1 K/UL (ref 0–0.2)
BASOPHILS NFR BLD: 1 % (ref 0–2)
CRP SERPL HS-MCNC: 25.8 MG/L (ref 0–5)
ECHO AO ROOT DIAM: 3 CM
ECHO AO ROOT INDEX: 1.42 CM/M2
ECHO AV AREA PEAK VELOCITY: 2.7 CM2
ECHO AV AREA VTI: 2.9 CM2
ECHO AV AREA/BSA PEAK VELOCITY: 1.3 CM2/M2
ECHO AV AREA/BSA VTI: 1.4 CM2/M2
ECHO AV MEAN GRADIENT: 4 MMHG
ECHO AV MEAN VELOCITY: 0.9 M/S
ECHO AV PEAK GRADIENT: 6 MMHG
ECHO AV PEAK VELOCITY: 1.2 M/S
ECHO AV VELOCITY RATIO: 0.75
ECHO AV VTI: 20.7 CM
ECHO BSA: 2.12 M2
ECHO EST RA PRESSURE: 15 MMHG
ECHO LA AREA 2C: 17.1 CM2
ECHO LA AREA 4C: 19.9 CM2
ECHO LA DIAMETER INDEX: 2.37 CM/M2
ECHO LA DIAMETER: 5 CM
ECHO LA MAJOR AXIS: 5.7 CM
ECHO LA MINOR AXIS: 5.4 CM
ECHO LA TO AORTIC ROOT RATIO: 1.67
ECHO LA VOL BP: 49 ML (ref 18–58)
ECHO LA VOL MOD A2C: 43 ML (ref 18–58)
ECHO LA VOL MOD A4C: 54 ML (ref 18–58)
ECHO LA VOL/BSA BIPLANE: 23 ML/M2 (ref 16–34)
ECHO LA VOLUME INDEX MOD A2C: 20 ML/M2 (ref 16–34)
ECHO LA VOLUME INDEX MOD A4C: 26 ML/M2 (ref 16–34)
ECHO LV E' LATERAL VELOCITY: 6.74 CM/S
ECHO LV E' SEPTAL VELOCITY: 25.7 CM/S
ECHO LV EDV A2C: 174 ML
ECHO LV EDV A4C: 143 ML
ECHO LV EDV INDEX A4C: 68 ML/M2
ECHO LV EDV NDEX A2C: 82 ML/M2
ECHO LV EF PHYSICIAN: 30 %
ECHO LV EJECTION FRACTION A2C: 28 %
ECHO LV EJECTION FRACTION A4C: 24 %
ECHO LV EJECTION FRACTION BIPLANE: 27 % (ref 55–100)
ECHO LV ESV A2C: 126 ML
ECHO LV ESV A4C: 109 ML
ECHO LV ESV INDEX A2C: 60 ML/M2
ECHO LV ESV INDEX A4C: 52 ML/M2
ECHO LV FRACTIONAL SHORTENING: 14 % (ref 28–44)
ECHO LV INTERNAL DIMENSION DIASTOLE INDEX: 2.37 CM/M2
ECHO LV INTERNAL DIMENSION DIASTOLIC: 5 CM (ref 4.2–5.9)
ECHO LV INTERNAL DIMENSION SYSTOLIC INDEX: 2.04 CM/M2
ECHO LV INTERNAL DIMENSION SYSTOLIC: 4.3 CM
ECHO LV IVSD: 1.4 CM (ref 0.6–1)
ECHO LV MASS 2D: 291.4 G (ref 88–224)
ECHO LV MASS INDEX 2D: 138.1 G/M2 (ref 49–115)
ECHO LV POSTERIOR WALL DIASTOLIC: 1.4 CM (ref 0.6–1)
ECHO LV RELATIVE WALL THICKNESS RATIO: 0.56
ECHO LVOT AREA: 3.8 CM2
ECHO LVOT AV VTI INDEX: 0.76
ECHO LVOT DIAM: 2.2 CM
ECHO LVOT MEAN GRADIENT: 2 MMHG
ECHO LVOT PEAK GRADIENT: 3 MMHG
ECHO LVOT PEAK VELOCITY: 0.9 M/S
ECHO LVOT STROKE VOLUME INDEX: 28.5 ML/M2
ECHO LVOT SV: 60 ML
ECHO LVOT VTI: 15.8 CM
ECHO MV A VELOCITY: 0.31 M/S
ECHO MV E DECELERATION TIME (DT): 97 MS
ECHO MV E VELOCITY: 1.19 M/S
ECHO MV E/A RATIO: 3.84
ECHO MV E/E' LATERAL: 17.66
ECHO MV E/E' RATIO (AVERAGED): 11.14
ECHO MV E/E' SEPTAL: 4.63
ECHO MV REGURGITANT ALIASING (NYQUIST) VELOCITY: 39 CM/S
ECHO MV REGURGITANT PEAK GRADIENT: 77 MMHG
ECHO MV REGURGITANT PEAK VELOCITY: 4.4 M/S
ECHO MV REGURGITANT RADIUS PISA: 1.1 CM
ECHO MV REGURGITANT VTIA: 119 CM
ECHO RA AREA 4C: 16.7 CM2
ECHO RA END SYSTOLIC VOLUME APICAL 4 CHAMBER INDEX BSA: 21 ML/M2
ECHO RA VOLUME: 44 ML
ECHO RIGHT VENTRICULAR SYSTOLIC PRESSURE (RVSP): 51 MMHG
ECHO RV TAPSE: 1 CM (ref 1.7–?)
ECHO TV REGURGITANT MAX VELOCITY: 3 M/S
ECHO TV REGURGITANT PEAK GRADIENT: 29 MMHG
EOSINOPHIL # BLD: 0.3 K/UL (ref 0–0.4)
EOSINOPHILS RELATIVE PERCENT: 2 % (ref 0–4)
ERYTHROCYTE [DISTWIDTH] IN BLOOD BY AUTOMATED COUNT: 14.5 % (ref 11.5–14.9)
ERYTHROCYTE [SEDIMENTATION RATE] IN BLOOD BY PHOTOMETRIC METHOD: 36 MM/HR (ref 0–20)
GLUCOSE BLD-MCNC: 112 MG/DL (ref 75–110)
GLUCOSE BLD-MCNC: 121 MG/DL (ref 75–110)
GLUCOSE BLD-MCNC: 124 MG/DL (ref 75–110)
GLUCOSE BLD-MCNC: 156 MG/DL (ref 75–110)
HCT VFR BLD AUTO: 32 % (ref 41–53)
HGB BLD-MCNC: 10.5 G/DL (ref 13.5–17.5)
LYMPHOCYTES NFR BLD: 1.5 K/UL (ref 1–4.8)
LYMPHOCYTES RELATIVE PERCENT: 11 % (ref 24–44)
MCH RBC QN AUTO: 29.2 PG (ref 26–34)
MCHC RBC AUTO-ENTMCNC: 32.9 G/DL (ref 31–37)
MCV RBC AUTO: 88.7 FL (ref 80–100)
MONOCYTES NFR BLD: 1 K/UL (ref 0.1–1.3)
MONOCYTES NFR BLD: 7 % (ref 1–7)
MRSA, DNA, NASAL: NEGATIVE
NEUTROPHILS NFR BLD: 79 % (ref 36–66)
NEUTS SEG NFR BLD: 10.6 K/UL (ref 1.3–9.1)
PLATELET # BLD AUTO: 316 K/UL (ref 150–450)
PMV BLD AUTO: 8 FL (ref 6–12)
RBC # BLD AUTO: 3.6 M/UL (ref 4.5–5.9)
SPECIMEN DESCRIPTION: NORMAL
WBC OTHER # BLD: 13.4 K/UL (ref 3.5–11)

## 2025-01-16 PROCEDURE — 86140 C-REACTIVE PROTEIN: CPT

## 2025-01-16 PROCEDURE — 99233 SBSQ HOSP IP/OBS HIGH 50: CPT | Performed by: INTERNAL MEDICINE

## 2025-01-16 PROCEDURE — 97110 THERAPEUTIC EXERCISES: CPT

## 2025-01-16 PROCEDURE — 82947 ASSAY GLUCOSE BLOOD QUANT: CPT

## 2025-01-16 PROCEDURE — 6370000000 HC RX 637 (ALT 250 FOR IP): Performed by: INTERNAL MEDICINE

## 2025-01-16 PROCEDURE — 1200000000 HC SEMI PRIVATE

## 2025-01-16 PROCEDURE — 6370000000 HC RX 637 (ALT 250 FOR IP): Performed by: PSYCHIATRY & NEUROLOGY

## 2025-01-16 PROCEDURE — 97530 THERAPEUTIC ACTIVITIES: CPT

## 2025-01-16 PROCEDURE — 85025 COMPLETE CBC W/AUTO DIFF WBC: CPT

## 2025-01-16 PROCEDURE — 85652 RBC SED RATE AUTOMATED: CPT

## 2025-01-16 PROCEDURE — 6360000004 HC RX CONTRAST MEDICATION: Performed by: INTERNAL MEDICINE

## 2025-01-16 PROCEDURE — 2580000003 HC RX 258: Performed by: INTERNAL MEDICINE

## 2025-01-16 PROCEDURE — 2500000003 HC RX 250 WO HCPCS: Performed by: NURSE PRACTITIONER

## 2025-01-16 PROCEDURE — 99231 SBSQ HOSP IP/OBS SF/LOW 25: CPT | Performed by: PODIATRIST

## 2025-01-16 PROCEDURE — 6360000002 HC RX W HCPCS: Performed by: INTERNAL MEDICINE

## 2025-01-16 PROCEDURE — C8929 TTE W OR WO FOL WCON,DOPPLER: HCPCS

## 2025-01-16 PROCEDURE — 6360000002 HC RX W HCPCS: Performed by: NURSE PRACTITIONER

## 2025-01-16 PROCEDURE — 36415 COLL VENOUS BLD VENIPUNCTURE: CPT

## 2025-01-16 RX ADMIN — CLINDAMYCIN IN 5 PERCENT DEXTROSE 600 MG: 12 INJECTION, SOLUTION INTRAVENOUS at 08:55

## 2025-01-16 RX ADMIN — METOPROLOL TARTRATE 25 MG: 25 TABLET, FILM COATED ORAL at 22:29

## 2025-01-16 RX ADMIN — MEROPENEM 1000 MG: 1 INJECTION INTRAVENOUS at 02:25

## 2025-01-16 RX ADMIN — CLINDAMYCIN IN 5 PERCENT DEXTROSE 600 MG: 12 INJECTION, SOLUTION INTRAVENOUS at 00:30

## 2025-01-16 RX ADMIN — CLINDAMYCIN IN 5 PERCENT DEXTROSE 600 MG: 12 INJECTION, SOLUTION INTRAVENOUS at 16:02

## 2025-01-16 RX ADMIN — LISINOPRIL 20 MG: 20 TABLET ORAL at 08:56

## 2025-01-16 RX ADMIN — MEROPENEM 1000 MG: 1 INJECTION INTRAVENOUS at 17:18

## 2025-01-16 RX ADMIN — MEROPENEM 1000 MG: 1 INJECTION INTRAVENOUS at 10:24

## 2025-01-16 RX ADMIN — RISPERIDONE 1 MG: 1 TABLET, FILM COATED ORAL at 22:29

## 2025-01-16 RX ADMIN — RISPERIDONE 1 MG: 1 TABLET, FILM COATED ORAL at 09:02

## 2025-01-16 RX ADMIN — SODIUM CHLORIDE, PRESERVATIVE FREE 10 ML: 5 INJECTION INTRAVENOUS at 08:57

## 2025-01-16 RX ADMIN — SULFUR HEXAFLUORIDE 2 ML: KIT at 14:56

## 2025-01-16 RX ADMIN — METOPROLOL TARTRATE 25 MG: 25 TABLET, FILM COATED ORAL at 08:56

## 2025-01-16 RX ADMIN — ENOXAPARIN SODIUM 40 MG: 100 INJECTION SUBCUTANEOUS at 08:57

## 2025-01-16 RX ADMIN — LISINOPRIL 20 MG: 20 TABLET ORAL at 22:29

## 2025-01-16 NOTE — CARE COORDINATION
DISCHARGE PLANNING NOTE:    LSW following for potential discharge placement. Patient may need SNF stay following possible amputation on Friday 1/17. Sons Alexmor and Law are patients legal next of kin as patient does not have capacity to make decisions. Katelyn is interested in guardianship, paper filled out by MD.

## 2025-01-16 NOTE — ANESTHESIA PRE PROCEDURE
IntraVENous PRN Sophia Unger APRN - CNP   10 mL at 01/15/25 1322   • 0.9 % sodium chloride infusion   IntraVENous PRN Sophia Unger APRN - CNP 10 mL/hr at 01/15/25 1801 10 mL/hr at 01/15/25 1801   • potassium chloride (KLOR-CON M) extended release tablet 40 mEq  40 mEq Oral PRN Sophia Unger APRN - CNP        Or   • potassium bicarb-citric acid (EFFER-K) effervescent tablet 40 mEq  40 mEq Oral PRN Sophia Unger APRN - CNP       • magnesium sulfate 2000 mg in water 50 mL IVPB  2,000 mg IntraVENous PRN Sophia Unger, CATALINO - CNP       • enoxaparin (LOVENOX) injection 40 mg  40 mg SubCUTAneous Daily Sophia Unger APRN - CNP   40 mg at 01/16/25 0857   • ondansetron (ZOFRAN-ODT) disintegrating tablet 4 mg  4 mg Oral Q8H PRN Sophia Unger APRN - CNP        Or   • ondansetron (ZOFRAN) injection 4 mg  4 mg IntraVENous Q6H PRN Sophia Unger APRN - CNP       • melatonin tablet 3 mg  3 mg Oral Nightly PRN Sophia Unger APRN - CNP   3 mg at 01/10/25 0040   • polyethylene glycol (GLYCOLAX) packet 17 g  17 g Oral Daily PRN Sophia Unger APRN - CNP   17 g at 01/10/25 0040   • bisacodyl (DULCOLAX) suppository 10 mg  10 mg Rectal Daily PRN Sophia Unger APRN - CNP       • acetaminophen (TYLENOL) tablet 650 mg  650 mg Oral Q6H PRN Sophia Unger APRN - CNP   650 mg at 01/15/25 0859    Or   • acetaminophen (TYLENOL) suppository 650 mg  650 mg Rectal Q6H PRN Sophia Unger, CATALINO - CNP           Allergies:  No Known Allergies    Problem List:    Patient Active Problem List   Diagnosis Code   • Ventral hernia without obstruction or gangrene K43.9   • Hyperglycemia R73.9   • Elevated procalcitonin R79.89   • Lactic acidosis E87.20   • Acute encephalopathy G93.40   • Homeless single person Z59.00   • Type 2 diabetes mellitus with hyperglycemia, with long-term current use of insulin (HCC) E11.65, Z79.4   • History of noncompliance with medical treatment Z91.199   • Wound of left ankle S91.002A   • Wound of abdomen S31.109A   •

## 2025-01-17 ENCOUNTER — APPOINTMENT (OUTPATIENT)
Dept: GENERAL RADIOLOGY | Age: 60
DRG: 853 | End: 2025-01-17
Payer: MEDICARE

## 2025-01-17 ENCOUNTER — ANESTHESIA (OUTPATIENT)
Dept: OPERATING ROOM | Age: 60
End: 2025-01-17
Payer: MEDICARE

## 2025-01-17 PROBLEM — M21.862 ACQUIRED POSTERIOR EQUINUS, LEFT: Status: ACTIVE | Noted: 2025-01-17

## 2025-01-17 LAB
ANION GAP SERPL CALCULATED.3IONS-SCNC: 8 MMOL/L (ref 9–16)
BASOPHILS # BLD: 0 K/UL (ref 0–0.2)
BASOPHILS NFR BLD: 0 % (ref 0–2)
BUN SERPL-MCNC: 13 MG/DL (ref 6–20)
CALCIUM SERPL-MCNC: 8.5 MG/DL (ref 8.6–10.4)
CHLORIDE SERPL-SCNC: 104 MMOL/L (ref 98–107)
CO2 SERPL-SCNC: 25 MMOL/L (ref 20–31)
CREAT SERPL-MCNC: 0.7 MG/DL (ref 0.7–1.2)
CRP SERPL HS-MCNC: 17.7 MG/L (ref 0–5)
EOSINOPHIL # BLD: 0.2 K/UL (ref 0–0.4)
EOSINOPHILS RELATIVE PERCENT: 2 % (ref 0–4)
ERYTHROCYTE [DISTWIDTH] IN BLOOD BY AUTOMATED COUNT: 14.6 % (ref 11.5–14.9)
ERYTHROCYTE [SEDIMENTATION RATE] IN BLOOD BY PHOTOMETRIC METHOD: 37 MM/HR (ref 0–20)
GFR, ESTIMATED: >90 ML/MIN/1.73M2
GLUCOSE BLD-MCNC: 126 MG/DL (ref 75–110)
GLUCOSE BLD-MCNC: 133 MG/DL (ref 75–110)
GLUCOSE BLD-MCNC: 166 MG/DL (ref 75–110)
GLUCOSE BLD-MCNC: 169 MG/DL (ref 75–110)
GLUCOSE BLD-MCNC: 342 MG/DL (ref 75–110)
GLUCOSE SERPL-MCNC: 144 MG/DL (ref 74–99)
HCT VFR BLD AUTO: 30.2 % (ref 41–53)
HGB BLD-MCNC: 10.2 G/DL (ref 13.5–17.5)
LYMPHOCYTES NFR BLD: 1.3 K/UL (ref 1–4.8)
LYMPHOCYTES RELATIVE PERCENT: 11 % (ref 24–44)
MCH RBC QN AUTO: 29.8 PG (ref 26–34)
MCHC RBC AUTO-ENTMCNC: 33.8 G/DL (ref 31–37)
MCV RBC AUTO: 88 FL (ref 80–100)
MONOCYTES NFR BLD: 0.9 K/UL (ref 0.1–1.3)
MONOCYTES NFR BLD: 7 % (ref 1–7)
NEUTROPHILS NFR BLD: 80 % (ref 36–66)
NEUTS SEG NFR BLD: 9.4 K/UL (ref 1.3–9.1)
PLATELET # BLD AUTO: 303 K/UL (ref 150–450)
PMV BLD AUTO: 7.2 FL (ref 6–12)
POTASSIUM SERPL-SCNC: 4.4 MMOL/L (ref 3.7–5.3)
RBC # BLD AUTO: 3.43 M/UL (ref 4.5–5.9)
SODIUM SERPL-SCNC: 137 MMOL/L (ref 136–145)
WBC OTHER # BLD: 11.9 K/UL (ref 3.5–11)

## 2025-01-17 PROCEDURE — 88307 TISSUE EXAM BY PATHOLOGIST: CPT

## 2025-01-17 PROCEDURE — 80048 BASIC METABOLIC PNL TOTAL CA: CPT

## 2025-01-17 PROCEDURE — 82947 ASSAY GLUCOSE BLOOD QUANT: CPT

## 2025-01-17 PROCEDURE — 3600000012 HC SURGERY LEVEL 2 ADDTL 15MIN: Performed by: PODIATRIST

## 2025-01-17 PROCEDURE — 85025 COMPLETE CBC W/AUTO DIFF WBC: CPT

## 2025-01-17 PROCEDURE — 6370000000 HC RX 637 (ALT 250 FOR IP): Performed by: PSYCHIATRY & NEUROLOGY

## 2025-01-17 PROCEDURE — 97168 OT RE-EVAL EST PLAN CARE: CPT

## 2025-01-17 PROCEDURE — 99233 SBSQ HOSP IP/OBS HIGH 50: CPT

## 2025-01-17 PROCEDURE — 6370000000 HC RX 637 (ALT 250 FOR IP): Performed by: INTERNAL MEDICINE

## 2025-01-17 PROCEDURE — 6360000002 HC RX W HCPCS: Performed by: NURSE ANESTHETIST, CERTIFIED REGISTERED

## 2025-01-17 PROCEDURE — 87075 CULTR BACTERIA EXCEPT BLOOD: CPT

## 2025-01-17 PROCEDURE — 6360000002 HC RX W HCPCS: Performed by: PODIATRIST

## 2025-01-17 PROCEDURE — 0Y6N0ZD DETACHMENT AT LEFT FOOT, PARTIAL 4TH RAY, OPEN APPROACH: ICD-10-PCS | Performed by: PODIATRIST

## 2025-01-17 PROCEDURE — 87205 SMEAR GRAM STAIN: CPT

## 2025-01-17 PROCEDURE — 2500000003 HC RX 250 WO HCPCS: Performed by: NURSE PRACTITIONER

## 2025-01-17 PROCEDURE — 6370000000 HC RX 637 (ALT 250 FOR IP)

## 2025-01-17 PROCEDURE — 0Y6N0ZF DETACHMENT AT LEFT FOOT, PARTIAL 5TH RAY, OPEN APPROACH: ICD-10-PCS | Performed by: PODIATRIST

## 2025-01-17 PROCEDURE — 2580000003 HC RX 258: Performed by: INTERNAL MEDICINE

## 2025-01-17 PROCEDURE — 6360000002 HC RX W HCPCS: Performed by: INTERNAL MEDICINE

## 2025-01-17 PROCEDURE — 6360000002 HC RX W HCPCS

## 2025-01-17 PROCEDURE — 88311 DECALCIFY TISSUE: CPT

## 2025-01-17 PROCEDURE — 27606 INCISION OF ACHILLES TENDON: CPT | Performed by: PODIATRIST

## 2025-01-17 PROCEDURE — 87176 TISSUE HOMOGENIZATION CULTR: CPT

## 2025-01-17 PROCEDURE — 6360000002 HC RX W HCPCS: Performed by: NURSE PRACTITIONER

## 2025-01-17 PROCEDURE — 99232 SBSQ HOSP IP/OBS MODERATE 35: CPT | Performed by: NURSE PRACTITIONER

## 2025-01-17 PROCEDURE — 3700000001 HC ADD 15 MINUTES (ANESTHESIA): Performed by: PODIATRIST

## 2025-01-17 PROCEDURE — 2709999900 HC NON-CHARGEABLE SUPPLY: Performed by: PODIATRIST

## 2025-01-17 PROCEDURE — 0L8P0ZZ DIVISION OF LEFT LOWER LEG TENDON, OPEN APPROACH: ICD-10-PCS | Performed by: PODIATRIST

## 2025-01-17 PROCEDURE — 86140 C-REACTIVE PROTEIN: CPT

## 2025-01-17 PROCEDURE — 0Y6N0ZB DETACHMENT AT LEFT FOOT, PARTIAL 2ND RAY, OPEN APPROACH: ICD-10-PCS | Performed by: PODIATRIST

## 2025-01-17 PROCEDURE — 2500000003 HC RX 250 WO HCPCS

## 2025-01-17 PROCEDURE — 3700000000 HC ANESTHESIA ATTENDED CARE: Performed by: PODIATRIST

## 2025-01-17 PROCEDURE — 3600000002 HC SURGERY LEVEL 2 BASE: Performed by: PODIATRIST

## 2025-01-17 PROCEDURE — 7100000000 HC PACU RECOVERY - FIRST 15 MIN: Performed by: PODIATRIST

## 2025-01-17 PROCEDURE — 0QBP0ZX EXCISION OF LEFT METATARSAL, OPEN APPROACH, DIAGNOSTIC: ICD-10-PCS | Performed by: PODIATRIST

## 2025-01-17 PROCEDURE — 85652 RBC SED RATE AUTOMATED: CPT

## 2025-01-17 PROCEDURE — 0Y6N0Z9 DETACHMENT AT LEFT FOOT, PARTIAL 1ST RAY, OPEN APPROACH: ICD-10-PCS | Performed by: PODIATRIST

## 2025-01-17 PROCEDURE — 0Y6N0ZC DETACHMENT AT LEFT FOOT, PARTIAL 3RD RAY, OPEN APPROACH: ICD-10-PCS | Performed by: PODIATRIST

## 2025-01-17 PROCEDURE — 7100000001 HC PACU RECOVERY - ADDTL 15 MIN: Performed by: PODIATRIST

## 2025-01-17 PROCEDURE — 28805 AMPUTATION THRU METATARSAL: CPT | Performed by: PODIATRIST

## 2025-01-17 PROCEDURE — 73630 X-RAY EXAM OF FOOT: CPT

## 2025-01-17 PROCEDURE — 87077 CULTURE AEROBIC IDENTIFY: CPT

## 2025-01-17 PROCEDURE — 2580000003 HC RX 258

## 2025-01-17 PROCEDURE — 36415 COLL VENOUS BLD VENIPUNCTURE: CPT

## 2025-01-17 PROCEDURE — 20245 BONE BIOPSY OPEN DEEP: CPT | Performed by: PODIATRIST

## 2025-01-17 PROCEDURE — 97164 PT RE-EVAL EST PLAN CARE: CPT

## 2025-01-17 PROCEDURE — 87070 CULTURE OTHR SPECIMN AEROBIC: CPT

## 2025-01-17 PROCEDURE — 1200000000 HC SEMI PRIVATE

## 2025-01-17 DEVICE — DRESSING WND MICRONIZED PARTIC 500 MG MATRISTEM MICROMATRIX: Type: IMPLANTABLE DEVICE | Site: FOOT | Status: FUNCTIONAL

## 2025-01-17 RX ORDER — SODIUM CHLORIDE 9 MG/ML
INJECTION, SOLUTION INTRAVENOUS PRN
Status: DISCONTINUED | OUTPATIENT
Start: 2025-01-17 | End: 2025-01-17 | Stop reason: HOSPADM

## 2025-01-17 RX ORDER — METOCLOPRAMIDE HYDROCHLORIDE 5 MG/ML
10 INJECTION INTRAMUSCULAR; INTRAVENOUS
Status: DISCONTINUED | OUTPATIENT
Start: 2025-01-17 | End: 2025-01-17 | Stop reason: HOSPADM

## 2025-01-17 RX ORDER — NALOXONE HYDROCHLORIDE 0.4 MG/ML
INJECTION, SOLUTION INTRAMUSCULAR; INTRAVENOUS; SUBCUTANEOUS PRN
Status: DISCONTINUED | OUTPATIENT
Start: 2025-01-17 | End: 2025-01-17 | Stop reason: HOSPADM

## 2025-01-17 RX ORDER — LIDOCAINE HYDROCHLORIDE 10 MG/ML
INJECTION, SOLUTION INFILTRATION; PERINEURAL PRN
Status: DISCONTINUED | OUTPATIENT
Start: 2025-01-17 | End: 2025-01-17 | Stop reason: ALTCHOICE

## 2025-01-17 RX ORDER — SODIUM CHLORIDE 0.9 % (FLUSH) 0.9 %
5-40 SYRINGE (ML) INJECTION EVERY 12 HOURS SCHEDULED
Status: DISCONTINUED | OUTPATIENT
Start: 2025-01-17 | End: 2025-01-17 | Stop reason: HOSPADM

## 2025-01-17 RX ORDER — PROPOFOL 10 MG/ML
INJECTION, EMULSION INTRAVENOUS
Status: DISCONTINUED | OUTPATIENT
Start: 2025-01-17 | End: 2025-01-17 | Stop reason: SDUPTHER

## 2025-01-17 RX ORDER — FENTANYL CITRATE 0.05 MG/ML
25 INJECTION, SOLUTION INTRAMUSCULAR; INTRAVENOUS EVERY 5 MIN PRN
Status: DISCONTINUED | OUTPATIENT
Start: 2025-01-17 | End: 2025-01-17 | Stop reason: HOSPADM

## 2025-01-17 RX ORDER — BUPIVACAINE HYDROCHLORIDE 5 MG/ML
INJECTION, SOLUTION EPIDURAL; INTRACAUDAL PRN
Status: DISCONTINUED | OUTPATIENT
Start: 2025-01-17 | End: 2025-01-17 | Stop reason: ALTCHOICE

## 2025-01-17 RX ORDER — DIPHENHYDRAMINE HYDROCHLORIDE 50 MG/ML
12.5 INJECTION INTRAMUSCULAR; INTRAVENOUS
Status: DISCONTINUED | OUTPATIENT
Start: 2025-01-17 | End: 2025-01-17 | Stop reason: HOSPADM

## 2025-01-17 RX ORDER — LIDOCAINE HYDROCHLORIDE 20 MG/ML
INJECTION, SOLUTION EPIDURAL; INFILTRATION; INTRACAUDAL; PERINEURAL
Status: DISCONTINUED | OUTPATIENT
Start: 2025-01-17 | End: 2025-01-17 | Stop reason: SDUPTHER

## 2025-01-17 RX ORDER — ONDANSETRON 2 MG/ML
4 INJECTION INTRAMUSCULAR; INTRAVENOUS
Status: DISCONTINUED | OUTPATIENT
Start: 2025-01-17 | End: 2025-01-17 | Stop reason: HOSPADM

## 2025-01-17 RX ORDER — SODIUM CHLORIDE 0.9 % (FLUSH) 0.9 %
5-40 SYRINGE (ML) INJECTION PRN
Status: DISCONTINUED | OUTPATIENT
Start: 2025-01-17 | End: 2025-01-17 | Stop reason: HOSPADM

## 2025-01-17 RX ADMIN — CLINDAMYCIN IN 5 PERCENT DEXTROSE 600 MG: 12 INJECTION, SOLUTION INTRAVENOUS at 17:46

## 2025-01-17 RX ADMIN — LISINOPRIL 20 MG: 20 TABLET ORAL at 08:58

## 2025-01-17 RX ADMIN — SODIUM CHLORIDE, PRESERVATIVE FREE 10 ML: 5 INJECTION INTRAVENOUS at 09:00

## 2025-01-17 RX ADMIN — MEROPENEM 1000 MG: 1 INJECTION INTRAVENOUS at 02:42

## 2025-01-17 RX ADMIN — MEROPENEM 1000 MG: 1 INJECTION INTRAVENOUS at 18:51

## 2025-01-17 RX ADMIN — MEROPENEM 1000 MG: 1 INJECTION INTRAVENOUS at 11:29

## 2025-01-17 RX ADMIN — PROPOFOL 50 MCG/KG/MIN: 10 INJECTION, EMULSION INTRAVENOUS at 11:27

## 2025-01-17 RX ADMIN — METOPROLOL TARTRATE 25 MG: 25 TABLET, FILM COATED ORAL at 20:49

## 2025-01-17 RX ADMIN — METOPROLOL TARTRATE 25 MG: 25 TABLET, FILM COATED ORAL at 08:58

## 2025-01-17 RX ADMIN — LIDOCAINE HYDROCHLORIDE 80 MG: 20 INJECTION, SOLUTION EPIDURAL; INFILTRATION; INTRACAUDAL; PERINEURAL at 11:27

## 2025-01-17 RX ADMIN — RISPERIDONE 1 MG: 1 TABLET, FILM COATED ORAL at 20:49

## 2025-01-17 RX ADMIN — INSULIN LISPRO 6 UNITS: 100 INJECTION, SOLUTION INTRAVENOUS; SUBCUTANEOUS at 20:54

## 2025-01-17 RX ADMIN — CLINDAMYCIN IN 5 PERCENT DEXTROSE 600 MG: 12 INJECTION, SOLUTION INTRAVENOUS at 01:00

## 2025-01-17 RX ADMIN — RISPERIDONE 1 MG: 1 TABLET, FILM COATED ORAL at 09:05

## 2025-01-17 RX ADMIN — PROPOFOL 30 MG: 10 INJECTION, EMULSION INTRAVENOUS at 11:27

## 2025-01-17 RX ADMIN — LISINOPRIL 20 MG: 20 TABLET ORAL at 20:49

## 2025-01-17 RX ADMIN — CLINDAMYCIN IN 5 PERCENT DEXTROSE 600 MG: 12 INJECTION, SOLUTION INTRAVENOUS at 09:03

## 2025-01-17 RX ADMIN — SODIUM CHLORIDE, PRESERVATIVE FREE 10 ML: 5 INJECTION INTRAVENOUS at 20:50

## 2025-01-17 ASSESSMENT — PAIN - FUNCTIONAL ASSESSMENT
PAIN_FUNCTIONAL_ASSESSMENT: 0-10
PAIN_FUNCTIONAL_ASSESSMENT: 0-10

## 2025-01-17 ASSESSMENT — PAIN SCALES - GENERAL: PAINLEVEL_OUTOF10: 0

## 2025-01-17 ASSESSMENT — LIFESTYLE VARIABLES: SMOKING_STATUS: 1

## 2025-01-17 NOTE — ANESTHESIA POSTPROCEDURE EVALUATION
Department of Anesthesiology  Postprocedure Note    Patient: Price Hernandez  MRN: [id number]  YOB: 1965  Date of evaluation: 1/17/2025    Procedure Summary       Date: 01/17/25 Room / Location: [address]    Anesthesia Start: 1123 Anesthesia Stop: 1250    Procedure: TRANSMETATARSAL AMPUTATION FOOT WITH ACHILLES TENDON LENGTHENING LEFT (Left: Foot) Diagnosis:       Osteomyelitis, unspecified site, unspecified type      (Osteomyelitis, unspecified site, unspecified type [M86.9])    Surgeons: Michelle Weems DPM Responsible Provider: Kevin Chin MD    Anesthesia Type: general, TIVA ASA Status: 4            Anesthesia Type: No value filed.    Roxie Phase I: Roxie Score: 8    Roxie Phase II:      Anesthesia Post Evaluation    Patient location during evaluation: PACU  Patient participation: complete - patient participated  Level of consciousness: awake and alert  Airway patency: patent  Nausea & Vomiting: no vomiting  Cardiovascular status: hemodynamically stable  Respiratory status: acceptable  Hydration status: euvolemic  Comments: POST- ANESTHESIA EVALUATION       Pt Name: Price Hernandez  MRN: [id number]  YOB: 1965  Date of evaluation: 1/17/2025  Time:  2:13 PM      BP (!) 132/99   Pulse 80   Temp 97.8 °F (36.6 °C) (Infrared)   Resp 27   Ht 1.829 m (6')   Wt 88.5 kg (195 lb)   SpO2 97%   BMI 26.45 kg/m²      Consciousness Level  Awake  Cardiopulmonary Status  Stable  Pain Adequately Treated YES  Nausea / Vomiting  NO  Adequate Hydration  YES  Anesthesia Related Complications NONE      Electronically signed by Kevin Chin MD on 1/17/2025 at 2:13 PM         Pain management: satisfactory to patient    No notable events documented.

## 2025-01-17 NOTE — OP NOTE
Operative Note      Patient: Price Hernandez  YOB: 1965  MRN: [id number]    Date of Procedure: 1/17/2025    Pre-Op Diagnosis Codes:      * Osteomyelitis, unspecified site, unspecified type [M86.9]    Post-Op Diagnosis: Post-Op Diagnosis Codes:     * Osteomyelitis, unspecified site, unspecified type [M86.9]    Diabetic foot infection left  Gangrene left foot  Diabetes with vascular disease  Acquired posterior equinus left     Procedure(s):  TRANSMETATARSAL AMPUTATION FOOT WITH ACHILLES TENDON LENGTHENING LEFT  Excision bone biopsy left foot    Surgeon(s):  Michelle Weems DPM    Assistant:   Resident: Sophia Barrow DPM; Wicho Atkinson DPM    Anesthesia: General    Injectables: 20 ml of a 1:1 racemic mixture of 0.5% marcaine plain and 1% lidocaine plain.    Hemostasis: Tamponade    Estimated Blood Loss (mL): 250    Complications: None    Specimens:   ID Type Source Tests Collected by Time Destination   1 : MID TARSAL SHAFT CLEARANCE FRAGMENTS Bone Foot CULTURE, ANAEROBIC AND AEROBIC Michelle Weems DPM 1/17/2025 1222    A : LEFT FOOT TOES Tissue Foot SURGICAL PATHOLOGY Michelle Weems DPM 1/17/2025 1211    B : MID TARSAL SHAFT CLEARANCE FRAGMENTS Bone Foot SURGICAL PATHOLOGY Michelle Weems DPM 1/17/2025 1223        Implants:  Implant Name Type Inv. Item Serial No.  Lot No. LRB No. Used Action   DRESSING WND MICRONIZED PARTIC 500 MG [employer] - [id number] Bone/Graft/Tissue/Human/Synth DRESSING WND MICRONIZED PARTIC 500 MG MATRISTEM MICROMATRIX  [employer] [facility]- [id number] Left 1 Implanted         Drains: * No LDAs found *    Findings:  Infection Present At Time Of Surgery (PATOS) (choose all levels that have infection present):  - Organ Space infection (below fascia) present as evidenced by osteomyelitis  Other Findings: None    Detailed Description of Procedure:       INDICATIONS FOR PROCEDURE:Patient has had multiple ulcers and infections in

## 2025-01-17 NOTE — CARE COORDINATION
DISCHARGE PLANNING NOTE:    Writer is following for potential discharge placement. Pt may require SNF stay post-op.     Writer was informed by MICHEAL Ricks bedside RN Crystal that pt sister was present stating she is the POA. Writer attempted to meet with family in room after 0930, no family present, pt out of room at procedure.  Electronically signed by ELLEN Ramon on 1/17/2025 at 11:10 AM

## 2025-01-17 NOTE — BRIEF OP NOTE
Brief Postoperative Note      Patient: Price Hernandez  YOB: 1965  MRN: [id number]    Date of Procedure: 1/17/2025    Pre-Op Diagnosis Codes:      * Osteomyelitis, unspecified site, unspecified type [M86.9]  Diabetic foot infection left  Gangrene left foot  Diabetes with vascular disease  Acquired posterior equinus left    Post-Op Diagnosis: Same       Procedure(s):  TRANSMETATARSAL AMPUTATION FOOT WITH ACHILLES TENDON LENGTHENING LEFT    Surgeon(s):  Michelle Weems DPM    Assistant:  Resident: Sophia Barrow DPM; Wicho Atkinson DPM    Anesthesia: General    Estimated Blood Loss (mL): 250 mL    Injectables: 20 ml of a 1:1 racemic mixture of 0.5% marcaine plain and 1% lidocaine plain.     Hemostasis: Tamponade    Complications: None    Specimens:   ID Type Source Tests Collected by Time Destination   1 : MID TARSAL SHAFT CLEARANCE FRAGMENTS Bone Foot CULTURE, ANAEROBIC AND AEROBIC Michelle Weems DPM 1/17/2025 1222    A : LEFT FOOT TOES Tissue Foot SURGICAL PATHOLOGY Michelle Weems DPM 1/17/2025 1211    B : MID TARSAL SHAFT CLEARANCE FRAGMENTS Bone Foot SURGICAL PATHOLOGY Michelle Weems DPM 1/17/2025 1223        Implants:  Implant Name Type Inv. Item Serial No.  Lot No. LRB No. Used Action   DRESSING WND MICRONIZED PARTIC 500 MG MATRISTEM MICROMATRIX - [id number]  DRESSING WND MICRONIZED PARTIC 500 MG MATRISTEM MICROMATRIX  [employer] SUSH[employer]- [id number] Left 1 Implanted         Drains: * No LDAs found *    Findings:  Infection Present At Time Of Surgery (PATOS) (choose all levels that have infection present):  No infection present  Other Findings: Osteomyelitis was consistent with preoperative imaging.  TMA performed.  Proximal clearance fragments of meds 2 and 3 sent pathology and culture.  Tendoachilles lengthening performed.  Patient left open.  Patient is to be nonweightbearing on the operative limb.  Planning for DPC Monday

## 2025-01-17 NOTE — CARE COORDINATION
At this time patient meets LTAC criteria.        S/P TRANSMETATARSAL AMPUTATION FOOT WITH ACHILLES TENDON LENGTHENING LEFT     Electronically signed by Meron Holguin RN on 1/17/2025 at 5:03 PM     Writer confused on Patient 's sister being POA, We  have no Paperwork to prove this! Next of Kin is 2 sons Neal and Raymor.    Electronically signed by Meron Holguin RN on 1/17/2025 at 5:06 PM

## 2025-01-17 NOTE — CARE COORDINATION
Writer meet with Mac Zepeda at bedside.  Katelyn advised patient meets LTAC criteria and Gave facility choices.  Alexmor would prefer [facility] as its close to his home.  Referral faxed to [facility]. Today.    Electronically signed by Meron Holguin RN on 1/17/2025 at 5:01 PM

## 2025-01-18 LAB
GLUCOSE BLD-MCNC: 170 MG/DL (ref 75–110)
GLUCOSE BLD-MCNC: 187 MG/DL (ref 75–110)
GLUCOSE BLD-MCNC: 358 MG/DL (ref 75–110)

## 2025-01-18 PROCEDURE — 6370000000 HC RX 637 (ALT 250 FOR IP)

## 2025-01-18 PROCEDURE — 6360000002 HC RX W HCPCS

## 2025-01-18 PROCEDURE — 2580000003 HC RX 258

## 2025-01-18 PROCEDURE — 6360000002 HC RX W HCPCS: Performed by: INTERNAL MEDICINE

## 2025-01-18 PROCEDURE — 2500000003 HC RX 250 WO HCPCS

## 2025-01-18 PROCEDURE — 99232 SBSQ HOSP IP/OBS MODERATE 35: CPT | Performed by: INTERNAL MEDICINE

## 2025-01-18 PROCEDURE — 82947 ASSAY GLUCOSE BLOOD QUANT: CPT

## 2025-01-18 PROCEDURE — 1200000000 HC SEMI PRIVATE

## 2025-01-18 RX ORDER — LEVOFLOXACIN 5 MG/ML
750 INJECTION, SOLUTION INTRAVENOUS EVERY 24 HOURS
Status: DISCONTINUED | OUTPATIENT
Start: 2025-01-18 | End: 2025-01-20 | Stop reason: HOSPADM

## 2025-01-18 RX ADMIN — INSULIN LISPRO 2 UNITS: 100 INJECTION, SOLUTION INTRAVENOUS; SUBCUTANEOUS at 09:39

## 2025-01-18 RX ADMIN — LEVOFLOXACIN 750 MG: 5 INJECTION, SOLUTION INTRAVENOUS at 12:01

## 2025-01-18 RX ADMIN — LISINOPRIL 20 MG: 20 TABLET ORAL at 20:37

## 2025-01-18 RX ADMIN — SODIUM CHLORIDE, PRESERVATIVE FREE 10 ML: 5 INJECTION INTRAVENOUS at 20:41

## 2025-01-18 RX ADMIN — CLINDAMYCIN IN 5 PERCENT DEXTROSE 600 MG: 12 INJECTION, SOLUTION INTRAVENOUS at 00:17

## 2025-01-18 RX ADMIN — ENOXAPARIN SODIUM 40 MG: 100 INJECTION SUBCUTANEOUS at 09:38

## 2025-01-18 RX ADMIN — INSULIN LISPRO 6 UNITS: 100 INJECTION, SOLUTION INTRAVENOUS; SUBCUTANEOUS at 11:58

## 2025-01-18 RX ADMIN — METOPROLOL TARTRATE 25 MG: 25 TABLET, FILM COATED ORAL at 09:39

## 2025-01-18 RX ADMIN — RISPERIDONE 1 MG: 1 TABLET, FILM COATED ORAL at 20:37

## 2025-01-18 RX ADMIN — MEROPENEM 1000 MG: 1 INJECTION INTRAVENOUS at 02:35

## 2025-01-18 RX ADMIN — ACETAMINOPHEN 650 MG: 325 TABLET ORAL at 09:53

## 2025-01-18 RX ADMIN — RISPERIDONE 1 MG: 1 TABLET, FILM COATED ORAL at 09:39

## 2025-01-18 RX ADMIN — LISINOPRIL 20 MG: 20 TABLET ORAL at 09:39

## 2025-01-18 RX ADMIN — METOPROLOL TARTRATE 25 MG: 25 TABLET, FILM COATED ORAL at 20:37

## 2025-01-18 ASSESSMENT — PAIN SCALES - GENERAL
PAINLEVEL_OUTOF10: 0
PAINLEVEL_OUTOF10: 6

## 2025-01-18 ASSESSMENT — PAIN SCALES - WONG BAKER: WONGBAKER_NUMERICALRESPONSE: NO HURT

## 2025-01-18 ASSESSMENT — PAIN DESCRIPTION - LOCATION: LOCATION: FOOT

## 2025-01-18 ASSESSMENT — PAIN DESCRIPTION - ORIENTATION: ORIENTATION: LEFT

## 2025-01-18 NOTE — CARE COORDINATION
DISCHARGE PLANNING NOTE:    Writer is following for potential discharge placement. Per RN CM lead notation, pt meets LTACH criteria, pt britt Zepeda is agreeable to [facility].    Message placed to [place] providing weekend coverage for [facility] at [phone] to check referral status. Pt has appropriate coding for admission, can accept today if medically ready.    Perfect[employer] message placed to charge MICHEAL Mckenna to confirm this.  Electronically signed by ELLEN Ramon on 1/18/2025 at 12:37 PM    Writer was informed by charge MICHEAL Mckenna that pt is scheduled for surgery Monday. Writer updated Shelley with this information.  Electronically signed by ELLEN Ramon on 1/18/2025 at 3:13 PM

## 2025-01-19 LAB
BASOPHILS # BLD: 0.1 K/UL (ref 0–0.2)
BASOPHILS NFR BLD: 1 % (ref 0–2)
CRP SERPL HS-MCNC: 32.9 MG/L (ref 0–5)
EOSINOPHIL # BLD: 0.1 K/UL (ref 0–0.4)
EOSINOPHILS RELATIVE PERCENT: 1 % (ref 0–4)
ERYTHROCYTE [DISTWIDTH] IN BLOOD BY AUTOMATED COUNT: 14.1 % (ref 11.5–14.9)
ERYTHROCYTE [SEDIMENTATION RATE] IN BLOOD BY PHOTOMETRIC METHOD: 49 MM/HR (ref 0–20)
GLUCOSE BLD-MCNC: 139 MG/DL (ref 75–110)
GLUCOSE BLD-MCNC: 151 MG/DL (ref 75–110)
GLUCOSE BLD-MCNC: 175 MG/DL (ref 75–110)
GLUCOSE BLD-MCNC: 208 MG/DL (ref 75–110)
HCT VFR BLD AUTO: 28.8 % (ref 41–53)
HGB BLD-MCNC: 9.8 G/DL (ref 13.5–17.5)
LYMPHOCYTES NFR BLD: 1.4 K/UL (ref 1–4.8)
LYMPHOCYTES RELATIVE PERCENT: 11 % (ref 24–44)
MCH RBC QN AUTO: 29.8 PG (ref 26–34)
MCHC RBC AUTO-ENTMCNC: 34 G/DL (ref 31–37)
MCV RBC AUTO: 87.7 FL (ref 80–100)
MONOCYTES NFR BLD: 1.2 K/UL (ref 0.1–1.3)
MONOCYTES NFR BLD: 9 % (ref 1–7)
NEUTROPHILS NFR BLD: 78 % (ref 36–66)
NEUTS SEG NFR BLD: 10.4 K/UL (ref 1.3–9.1)
PLATELET # BLD AUTO: 313 K/UL (ref 150–450)
PMV BLD AUTO: 7.3 FL (ref 6–12)
RBC # BLD AUTO: 3.28 M/UL (ref 4.5–5.9)
WBC OTHER # BLD: 13.2 K/UL (ref 3.5–11)

## 2025-01-19 PROCEDURE — 2580000003 HC RX 258

## 2025-01-19 PROCEDURE — 82947 ASSAY GLUCOSE BLOOD QUANT: CPT

## 2025-01-19 PROCEDURE — 86140 C-REACTIVE PROTEIN: CPT

## 2025-01-19 PROCEDURE — 6370000000 HC RX 637 (ALT 250 FOR IP)

## 2025-01-19 PROCEDURE — 2500000003 HC RX 250 WO HCPCS

## 2025-01-19 PROCEDURE — 6360000002 HC RX W HCPCS: Performed by: INTERNAL MEDICINE

## 2025-01-19 PROCEDURE — 85652 RBC SED RATE AUTOMATED: CPT

## 2025-01-19 PROCEDURE — 6360000002 HC RX W HCPCS

## 2025-01-19 PROCEDURE — 1200000000 HC SEMI PRIVATE

## 2025-01-19 PROCEDURE — 85025 COMPLETE CBC W/AUTO DIFF WBC: CPT

## 2025-01-19 PROCEDURE — 99233 SBSQ HOSP IP/OBS HIGH 50: CPT | Performed by: INTERNAL MEDICINE

## 2025-01-19 PROCEDURE — 36415 COLL VENOUS BLD VENIPUNCTURE: CPT

## 2025-01-19 RX ORDER — OXYCODONE AND ACETAMINOPHEN 5; 325 MG/1; MG/1
1 TABLET ORAL EVERY 4 HOURS PRN
Status: DISCONTINUED | OUTPATIENT
Start: 2025-01-19 | End: 2025-01-20 | Stop reason: HOSPADM

## 2025-01-19 RX ORDER — OXYCODONE AND ACETAMINOPHEN 5; 325 MG/1; MG/1
2 TABLET ORAL EVERY 4 HOURS PRN
Status: DISCONTINUED | OUTPATIENT
Start: 2025-01-19 | End: 2025-01-20 | Stop reason: HOSPADM

## 2025-01-19 RX ADMIN — RISPERIDONE 1 MG: 1 TABLET, FILM COATED ORAL at 21:57

## 2025-01-19 RX ADMIN — INSULIN LISPRO 2 UNITS: 100 INJECTION, SOLUTION INTRAVENOUS; SUBCUTANEOUS at 12:06

## 2025-01-19 RX ADMIN — SODIUM CHLORIDE, PRESERVATIVE FREE 10 ML: 5 INJECTION INTRAVENOUS at 21:56

## 2025-01-19 RX ADMIN — RISPERIDONE 1 MG: 1 TABLET, FILM COATED ORAL at 08:18

## 2025-01-19 RX ADMIN — METOPROLOL TARTRATE 25 MG: 25 TABLET, FILM COATED ORAL at 08:16

## 2025-01-19 RX ADMIN — SODIUM CHLORIDE: 9 INJECTION, SOLUTION INTRAVENOUS at 13:26

## 2025-01-19 RX ADMIN — ENOXAPARIN SODIUM 40 MG: 100 INJECTION SUBCUTANEOUS at 08:15

## 2025-01-19 RX ADMIN — LISINOPRIL 20 MG: 20 TABLET ORAL at 08:15

## 2025-01-19 RX ADMIN — OXYCODONE HYDROCHLORIDE AND ACETAMINOPHEN 2 TABLET: 5; 325 TABLET ORAL at 12:08

## 2025-01-19 RX ADMIN — LEVOFLOXACIN 750 MG: 5 INJECTION, SOLUTION INTRAVENOUS at 12:05

## 2025-01-19 RX ADMIN — LISINOPRIL 20 MG: 20 TABLET ORAL at 21:55

## 2025-01-19 RX ADMIN — SODIUM CHLORIDE, PRESERVATIVE FREE 10 ML: 5 INJECTION INTRAVENOUS at 08:18

## 2025-01-19 RX ADMIN — METOPROLOL TARTRATE 25 MG: 25 TABLET, FILM COATED ORAL at 21:55

## 2025-01-19 ASSESSMENT — PAIN SCALES - GENERAL
PAINLEVEL_OUTOF10: 0
PAINLEVEL_OUTOF10: 10
PAINLEVEL_OUTOF10: 0

## 2025-01-19 ASSESSMENT — PAIN DESCRIPTION - LOCATION: LOCATION: FOOT

## 2025-01-19 ASSESSMENT — PAIN - FUNCTIONAL ASSESSMENT: PAIN_FUNCTIONAL_ASSESSMENT: PREVENTS OR INTERFERES SOME ACTIVE ACTIVITIES AND ADLS

## 2025-01-19 ASSESSMENT — PAIN SCALES - WONG BAKER
WONGBAKER_NUMERICALRESPONSE: NO HURT
WONGBAKER_NUMERICALRESPONSE: NO HURT

## 2025-01-19 ASSESSMENT — PAIN DESCRIPTION - ORIENTATION: ORIENTATION: LEFT

## 2025-01-19 NOTE — CONSULTS
Cardiology Consultation         Date:   1/19/2025  Patient name: Price Hernandez  Date of admission:  1/9/2025  7:24 PM  MRN:   [id number]  YOB: 1965    Reason for Admission: left diabetic foot infection, sepsis     Chief Complaint: bilateral foot pain    History of present illness:     59-year-old male with no previous cardiac history, denies history of peripheral arterial disease and type 2 diabetes mellitus, admitted with bilateral foot pain, being treated for diabetic foot infection with osteomyelitis of first and second distal phalanx was, status post TMA on 1/7/2025, on IV antibiotics.  No reported history of chest pain or dyspnea.  ECG on 1/9/2025 showed sinus tachycardia with nonspecific T wave abnormality.  Echocardiogram done this admission showed severely reduced LVEF of 25 to 30% with global hypokinesia and moderate mitral regurgitation.  Cardiology consulted for further evaluation.  This morning he appears quite comfortable, denies any active chest pain or dyspnea, no significant volume overload noted on examination, he has been on room air.        Past Medical History:   has a past medical history of Acute encephalopathy, Acute kidney injury (HCC), Homelessness, Hyperglycemia, Hyperlipidemia, Ketonemia, Lactic acidosis, Noncompliance, Schizoaffective disorder (HCC), Tobacco abuse, Type 2 diabetes mellitus with hyperosmolar nonketotic hyperglycemia (HCC), Ventral hernia, Wound of abdomen, and Wound of left ankle.    Past Surgical History:   has a past surgical history that includes Arm Surgery and Foot surgery (Left, 1/17/2025).     Home Medications:    Prior to Admission medications    Medication Sig Start Date End Date Taking? Authorizing Provider   ibuprofen (ADVIL;MOTRIN) 200 MG tablet Take 2 tablets by mouth every 6 hours as needed for Pain   Yes Provider, MD Hector   acetaminophen (TYLENOL) 500 MG tablet Take 2 tablets by mouth every 6 hours as needed for Pain   
      Division of Vascular Surgery        New Consult    History of Present Illness:      Price Hernandez is a 59 y.o. male who presents with left foot wounds. Has DM. He is homeless. WBC is 12. PVR study was performed. This is essentially normal except on the left side the DP has abnormal waveform.    Medical History:     Past Medical History:   Diagnosis Date    Acute encephalopathy     Acute kidney injury (HCC)     Homelessness     Hyperglycemia     Hyperlipidemia     Ketonemia     Lactic acidosis     Noncompliance     Schizoaffective disorder (HCC)     Tobacco abuse     Type 2 diabetes mellitus with hyperosmolar nonketotic hyperglycemia (HCC) 10/02/2016    Ventral hernia     Wound of abdomen     Wound of left ankle        Surgical History:     Past Surgical History:   Procedure Laterality Date    ARM SURGERY         Family History:     Family History   Problem Relation Age of Onset    Cancer Mother     Cancer Father        Allergies:       Patient has no known allergies.    Medications:      Current Facility-Administered Medications   Medication Dose Route Frequency Provider Last Rate Last Admin    nicotine (NICODERM CQ) 21 MG/24HR 1 patch  1 patch TransDERmal Daily Sophia Unger APRN - CNP   1 patch at 01/10/25 0039    povidone-iodine (BETADINE) 10 % external solution   Topical PRN Dulce Maria Perez, DPM        lidocaine PF 1 % injection 20 mL  20 mL IntraDERmal Once Dulce Maria Perez, DPM        benzonatate (TESSALON) capsule 100 mg  100 mg Oral TID PRN Ari Agosto MD   100 mg at 01/10/25 1506    vancomycin (VANCOCIN) intermittent dosing (placeholder)   Other RX Placeholder Brett West DO        vancomycin (VANCOCIN) 1250 mg in 250 mL IVPB  1,250 mg IntraVENous Q12H Brett West DO   Stopped at 01/10/25 1100    piperacillin-tazobactam (ZOSYN) 3,375 mg in sodium chloride 0.9 % 50 mL IVPB (mini-bag)  3,375 mg IntraVENous Q8H Sophia Unger APRN - CNP 12.5 mL/hr 
  Consult Note  Foot and Ankle Surgery    CC/Reason for consult: Left foot wounds    HPI:    The patient is a 59 y.o. male seen at [facility] for left foot wounds.  The patient states that he noticed pain beginning last week but did not initially notice a wound.  He has discoloration of his toes and a foul odor that he began to notice a few days ago.  Upon presentation to the ED, patient was tachycardic and febrile meeting sepsis criteria.  Patient has a history of uncontrolled diabetes most recent hemoglobin A1c was 6.7% (01/10/25) down from 18.9% (11/26/22).  The patient states he is currently homeless and receiving minimal medical care.  PMH significant for schizoaffective disorder, HLD, JONATHAN, ventral hernia, PAD.  Patient states that he smokes cigarettes, approximately 0.25 packs per day. He cannot recall how long he has been smoking for but states it has been a long time.  Patient denies illicit drug use.  Patient denies currently taking blood thinners.  To the patient's knowledge, he has received no vascular intervention in the past.  Chart review shows patient was seen by Dr. Arredondo on 01/31/23 for gangrene of the right foot and PAD. Dr. Arredondo recommended that he undergo an angiogram with runoff with possible intervention on the right to help improve the blood flow to the right foot in the out patient setting.  It is unclear if the patient followed up with Dr. Arredondo. Foot and Ankle Surgery was consulted for further surgical evaluation.    PCP is No, Pcp    ROS:   Review of Systems   Constitutional:  Positive for activity change, chills and fever.   Gastrointestinal:  Positive for nausea. Negative for diarrhea and vomiting.   Musculoskeletal:  Positive for arthralgias, gait problem, joint swelling and myalgias.   Skin:  Positive for color change and wound.       Past Medical History   has a past medical history of Acute encephalopathy, Acute kidney injury (HCC), Homelessness, Hyperglycemia, 
..  Palliative Care Inpatient Consult    NAME:  Price Hernandez  MEDICAL RECORD NUMBER:  [id number]  AGE: 59 y.o.   GENDER: male  : 1965  TODAY'S DATE:  1/15/2025    Reasons for Consultation:    Symptom and/or pain management  Provision of information regarding PC and/or hospice philosophies  Complex, time-intensive communication and interdisciplinary psychosocial support  Clarification of goals of care and/or assistance with difficult decision-making  Guidance in regards to resources and transition(s)    Members of PC team contributing to this consultation are : Divya Gomez, Palliative Care APRN-CNP    Plan      Palliative Interaction: I went to see patient and he was sitting in bed awake and no family is present. I explained my role to him. He is eating lunch presently. He is oriented to self, place and reports correct month/year as well. He was not able to tell me why he was in the hospital other than \"they are trying to find me some placement.\" He tells me that he is homeless and has been living in [facility] for 5-7 years. He reports that prior to this he lives with his sister but she moved into a one bedroom place d/t place being vandalized.     I discussed patients chronic condition and current hospitalized problems and he seems to lack insight on his current condition and risk if he does not proceed with treatment. He tells me \"I will just deal with it.\" He did state, \"I fear I will not be able to walk if I have it done.\" Otherwise, he is not able to identify risk of not being treated. He also reported that he did not want CPR or intubation but did not explain what the results of not having this was. Per psychiatry's note it seems to align and they report that patient lacks capacity to make medical decisions at this time.     Patient reports to be single and has 2 bio children. He reports sons coming up to visit him yesterday and bringing him pizza.     I discussed symptoms and he 
Comprehensive Nutrition Assessment    Type and Reason for Visit:  Initial, Consult, Wound (Diabetes with poor compliance)    Nutrition Recommendations/Plan:   Continue current diet.  Provide Glucerna x 1 per day.  NPO after midnight. Suggest resume diet and supplement when appropriate.     Malnutrition Assessment:  Malnutrition Status:  At risk for malnutrition (Homeless with wounds) (01/10/25 3810)    Context:  Acute Illness     Findings of the 6 clinical characteristics of malnutrition:  Energy Intake:  Unable to assess  Weight Loss:  Unable to assess     Body Fat Loss:  Unable to assess     Muscle Mass Loss:     Unable to assess  Fluid Accumulation:  Mild (Mild to moderate) Extremities   Strength:  Not Performed    Nutrition Assessment:    Pt admitted with sepsis. Found to have severe peripheral vascular disease in the left foot with toe gangrene; surgery is being considered for tomorrow. Pt seen earlier today by another RD. Chart reviewed. Pt apparently ate breakfast today but then was NPO for procedure. Pt willing to receive 1 Glucerna per day.  Pt is homeless and had not been taking medications prior to admission.    Nutrition Related Findings:    Edema: +2 RLE, LLE. Hx: DM, schizoaffective disorder, HLD, JONATHAN, ventral hernia, PAD. Wound Type: Diabetic Ulcer (Full-thickness ulceration with exposed subcutaneous tissue, left foot; gangrene.)       Current Nutrition Intake & Therapies:    Average Meal Intake: Unable to assess     ADULT DIET; Regular; 4 carb choices (60 gm/meal)  Diet NPO    Anthropometric Measures:  Height: 185.4 cm (6' 0.99\")  Ideal Body Weight (IBW): 184 lbs (84 kg)    Admission Body Weight: 95 kg (209 lb 7 oz)  Current Body Weight: 95 kg (209 lb 7 oz), 113.8 % IBW. Weight Source: Not specified  Current BMI (kg/m2): 27.6                             BMI Categories: Overweight (BMI 25.0-29.9)    Estimated Daily Nutrient Needs:  Energy Requirements Based On: Formula  Weight Used for Energy 
Department of Psychiatry  Behavioral Health Consult    REASON FOR CONSULT: Capacity Evaluation    CONSULTING PHYSICIAN: Dr. Agosto    History obtained from: Patient and chart    HISTORY OF PRESENT ILLNESS:    The patient is a 59 y.o. male with significant past psychiatric history of schizoaffective disorder who presents with foot pain and is admitted for the management of substance.  The patient has a medical history significant for encephalopathy, diabetes, hyper lipidemia, osteomyelitis of the right foot but peripheral artery disease and atherosclerosis of right leg with gangrene.  The patient is not currently on any antipsychotic medications.  The patient has elevated white cells and neutrophils.    The patient was seen face-to-face.  He was unable to give me a coherent account of the circumstances that led to his admission.  He has been homeless leading up to admission.  The patient is guarded and minimizes his psychiatric history and denies current symptoms.  The patient notes that he has a foot problem but is unable to describe it in more detail.  The patient does not appear to appreciate the gravity of his infection and its risks.    The patient acknowledges that he does have a guardian who is his sister.  The patient has a long history of mental health problems and acknowledges that he has previously been on antipsychotics and linked with [facility] though he does not recall when he last went for an appointment or took his medications.        The patient is not currently receiving care for the above psychiatric illness.      Psychiatric Review of Systems           Obsessions and Compulsions: Denies       Susan or Hypomania: Denies     Hallucinations: Denies     Panic Attacks:  Denies     Delusions:  Denies     Phobias:  Denies     Trauma: Denies      Substance Abuse History:  Denies alcohol or drug use. Previous drug screens have been negative      Past Psychiatric History:  Prior Diagnosis: Schizophrenia and 
edema involving the 2nd and 3rd middle phalanx, proximal  phalanx, and distal metatarsals, which may represent reactive osteitis versus  early osteomyelitis.  3. Soft tissue ulceration along the distal 1st and 2nd digits with soft  tissue gas.  4. Focal fluid collection along the dorsum of the 2/3 metatarsal heads  measuring up to 1.4 cm.    I have personally reviewed the past medical history, past surgical history, medications, social history, and family history, and I haveupdated the database accordingly.      Allergies:   Patient has no known allergies.     Review of Systems:     Review of Systems   All other systems reviewed and are negative.      Physical Examination :       Physical Exam  Vitals and nursing note reviewed.   Constitutional:       Appearance: Normal appearance. He is not ill-appearing.   HENT:      Head: Normocephalic and atraumatic.      Right Ear: External ear normal.      Left Ear: External ear normal.      Nose: Nose normal.      Mouth/Throat:      Mouth: Mucous membranes are moist.      Pharynx: Oropharynx is clear.   Eyes:      Extraocular Movements: Extraocular movements intact.      Pupils: Pupils are equal, round, and reactive to light.   Cardiovascular:      Rate and Rhythm: Normal rate and regular rhythm.      Heart sounds: Normal heart sounds. No murmur heard.  Pulmonary:      Effort: Pulmonary effort is normal.      Breath sounds: Normal breath sounds. No rhonchi.   Abdominal:      General: Bowel sounds are normal. There is no distension.      Palpations: Abdomen is soft.      Tenderness: There is no abdominal tenderness.   Genitourinary:     Comments: No chakraborty.  Musculoskeletal:      Cervical back: Neck supple.      Right lower leg: Edema present.      Left lower leg: Edema present.   Skin:     General: Skin is warm.      Capillary Refill: Capillary refill takes less than 2 seconds.      Comments: Photos as in chart.   Neurological:      Mental Status: He is alert and oriented to

## 2025-01-19 NOTE — CARE COORDINATION
DISCHARGE PLANNING NOTE:    Writer is following for potential discharge placement. Pt has been accepted at [facility], no authorization necessary for admission.    Writer met with pt for update, no family in room. Writer placed call to pt britt Zepead for update on acceptance at [facility]. All questions answered at this time.  Electronically signed by ELLEN Ramon on 1/19/2025 at 12:19 PM

## 2025-01-20 ENCOUNTER — ANESTHESIA EVENT (OUTPATIENT)
Dept: OPERATING ROOM | Age: 60
DRG: 853 | End: 2025-01-20
Payer: MEDICARE

## 2025-01-20 ENCOUNTER — ANESTHESIA (OUTPATIENT)
Dept: OPERATING ROOM | Age: 60
DRG: 853 | End: 2025-01-20
Payer: MEDICARE

## 2025-01-20 VITALS
OXYGEN SATURATION: 93 % | TEMPERATURE: 98.6 F | HEART RATE: 94 BPM | WEIGHT: 201.28 LBS | SYSTOLIC BLOOD PRESSURE: 127 MMHG | RESPIRATION RATE: 16 BRPM | HEIGHT: 72 IN | BODY MASS INDEX: 27.26 KG/M2 | DIASTOLIC BLOOD PRESSURE: 87 MMHG

## 2025-01-20 PROBLEM — I50.20 HFREF (HEART FAILURE WITH REDUCED EJECTION FRACTION) (HCC): Status: ACTIVE | Noted: 2025-01-20

## 2025-01-20 PROBLEM — I42.9 CARDIOMYOPATHY (HCC): Status: ACTIVE | Noted: 2025-01-20

## 2025-01-20 LAB
BASOPHILS # BLD: 0.2 K/UL (ref 0–0.2)
BASOPHILS NFR BLD: 1 % (ref 0–2)
CRP SERPL HS-MCNC: 33 MG/L (ref 0–5)
EOSINOPHIL # BLD: 0.2 K/UL (ref 0–0.4)
EOSINOPHILS RELATIVE PERCENT: 2 % (ref 0–4)
ERYTHROCYTE [DISTWIDTH] IN BLOOD BY AUTOMATED COUNT: 14.6 % (ref 11.5–14.9)
ERYTHROCYTE [SEDIMENTATION RATE] IN BLOOD BY PHOTOMETRIC METHOD: 52 MM/HR (ref 0–20)
GLUCOSE BLD-MCNC: 108 MG/DL (ref 75–110)
GLUCOSE BLD-MCNC: 124 MG/DL (ref 75–110)
GLUCOSE BLD-MCNC: 132 MG/DL (ref 75–110)
GLUCOSE BLD-MCNC: 143 MG/DL (ref 75–110)
GLUCOSE BLD-MCNC: 160 MG/DL (ref 75–110)
HCT VFR BLD AUTO: 28.2 % (ref 41–53)
HGB BLD-MCNC: 9.4 G/DL (ref 13.5–17.5)
LYMPHOCYTES NFR BLD: 1.3 K/UL (ref 1–4.8)
LYMPHOCYTES RELATIVE PERCENT: 10 % (ref 24–44)
MCH RBC QN AUTO: 29.5 PG (ref 26–34)
MCHC RBC AUTO-ENTMCNC: 33.3 G/DL (ref 31–37)
MCV RBC AUTO: 88.5 FL (ref 80–100)
MONOCYTES NFR BLD: 0.9 K/UL (ref 0.1–1.3)
MONOCYTES NFR BLD: 8 % (ref 1–7)
NEUTROPHILS NFR BLD: 79 % (ref 36–66)
NEUTS SEG NFR BLD: 9.8 K/UL (ref 1.3–9.1)
PLATELET # BLD AUTO: 281 K/UL (ref 150–450)
PMV BLD AUTO: 8.1 FL (ref 6–12)
RBC # BLD AUTO: 3.19 M/UL (ref 4.5–5.9)
WBC OTHER # BLD: 12.3 K/UL (ref 3.5–11)

## 2025-01-20 PROCEDURE — 6360000002 HC RX W HCPCS: Performed by: PODIATRIST

## 2025-01-20 PROCEDURE — 6360000002 HC RX W HCPCS: Performed by: INTERNAL MEDICINE

## 2025-01-20 PROCEDURE — 7100000000 HC PACU RECOVERY - FIRST 15 MIN: Performed by: PODIATRIST

## 2025-01-20 PROCEDURE — 2580000003 HC RX 258: Performed by: ANESTHESIOLOGY

## 2025-01-20 PROCEDURE — 13160 SEC CLSR SURG WND/DEHSN XTN: CPT | Performed by: PODIATRIST

## 2025-01-20 PROCEDURE — 3600000002 HC SURGERY LEVEL 2 BASE: Performed by: PODIATRIST

## 2025-01-20 PROCEDURE — 7100000001 HC PACU RECOVERY - ADDTL 15 MIN: Performed by: PODIATRIST

## 2025-01-20 PROCEDURE — 99222 1ST HOSP IP/OBS MODERATE 55: CPT | Performed by: INTERNAL MEDICINE

## 2025-01-20 PROCEDURE — 85652 RBC SED RATE AUTOMATED: CPT

## 2025-01-20 PROCEDURE — 3700000001 HC ADD 15 MINUTES (ANESTHESIA): Performed by: PODIATRIST

## 2025-01-20 PROCEDURE — 0JCP0ZZ EXTIRPATION OF MATTER FROM LEFT LOWER LEG SUBCUTANEOUS TISSUE AND FASCIA, OPEN APPROACH: ICD-10-PCS

## 2025-01-20 PROCEDURE — 86140 C-REACTIVE PROTEIN: CPT

## 2025-01-20 PROCEDURE — 82947 ASSAY GLUCOSE BLOOD QUANT: CPT

## 2025-01-20 PROCEDURE — 6370000000 HC RX 637 (ALT 250 FOR IP)

## 2025-01-20 PROCEDURE — 3600000012 HC SURGERY LEVEL 2 ADDTL 15MIN: Performed by: PODIATRIST

## 2025-01-20 PROCEDURE — 2709999900 HC NON-CHARGEABLE SUPPLY: Performed by: PODIATRIST

## 2025-01-20 PROCEDURE — 99232 SBSQ HOSP IP/OBS MODERATE 35: CPT

## 2025-01-20 PROCEDURE — 0YQN0ZZ REPAIR LEFT FOOT, OPEN APPROACH: ICD-10-PCS

## 2025-01-20 PROCEDURE — 3700000000 HC ANESTHESIA ATTENDED CARE: Performed by: PODIATRIST

## 2025-01-20 PROCEDURE — 36415 COLL VENOUS BLD VENIPUNCTURE: CPT

## 2025-01-20 PROCEDURE — 85025 COMPLETE CBC W/AUTO DIFF WBC: CPT

## 2025-01-20 PROCEDURE — 6360000002 HC RX W HCPCS: Performed by: NURSE ANESTHETIST, CERTIFIED REGISTERED

## 2025-01-20 RX ORDER — SODIUM CHLORIDE 9 MG/ML
INJECTION, SOLUTION INTRAVENOUS CONTINUOUS
Status: DISCONTINUED | OUTPATIENT
Start: 2025-01-20 | End: 2025-01-20 | Stop reason: HOSPADM

## 2025-01-20 RX ORDER — PROPOFOL 10 MG/ML
INJECTION, EMULSION INTRAVENOUS
Status: DISCONTINUED | OUTPATIENT
Start: 2025-01-20 | End: 2025-01-20 | Stop reason: SDUPTHER

## 2025-01-20 RX ORDER — NALOXONE HYDROCHLORIDE 0.4 MG/ML
INJECTION, SOLUTION INTRAMUSCULAR; INTRAVENOUS; SUBCUTANEOUS PRN
Status: DISCONTINUED | OUTPATIENT
Start: 2025-01-20 | End: 2025-01-20 | Stop reason: HOSPADM

## 2025-01-20 RX ORDER — METOCLOPRAMIDE HYDROCHLORIDE 5 MG/ML
10 INJECTION INTRAMUSCULAR; INTRAVENOUS
Status: DISCONTINUED | OUTPATIENT
Start: 2025-01-20 | End: 2025-01-20 | Stop reason: HOSPADM

## 2025-01-20 RX ORDER — BUPIVACAINE HYDROCHLORIDE 5 MG/ML
INJECTION, SOLUTION EPIDURAL; INTRACAUDAL PRN
Status: DISCONTINUED | OUTPATIENT
Start: 2025-01-20 | End: 2025-01-20 | Stop reason: ALTCHOICE

## 2025-01-20 RX ORDER — LEVOFLOXACIN 750 MG/1
750 TABLET, FILM COATED ORAL DAILY
Qty: 5 TABLET | Refills: 0 | Status: ON HOLD | OUTPATIENT
Start: 2025-01-20 | End: 2025-01-25

## 2025-01-20 RX ORDER — SODIUM CHLORIDE 0.9 % (FLUSH) 0.9 %
5-40 SYRINGE (ML) INJECTION PRN
Status: DISCONTINUED | OUTPATIENT
Start: 2025-01-20 | End: 2025-01-20 | Stop reason: HOSPADM

## 2025-01-20 RX ORDER — FENTANYL CITRATE 0.05 MG/ML
25 INJECTION, SOLUTION INTRAMUSCULAR; INTRAVENOUS EVERY 5 MIN PRN
Status: DISCONTINUED | OUTPATIENT
Start: 2025-01-20 | End: 2025-01-20 | Stop reason: HOSPADM

## 2025-01-20 RX ORDER — RISPERIDONE 1 MG/1
1 TABLET ORAL 2 TIMES DAILY
Qty: 60 TABLET | Refills: 0 | Status: ON HOLD | OUTPATIENT
Start: 2025-01-20

## 2025-01-20 RX ORDER — SODIUM CHLORIDE 0.9 % (FLUSH) 0.9 %
5-40 SYRINGE (ML) INJECTION EVERY 12 HOURS SCHEDULED
Status: DISCONTINUED | OUTPATIENT
Start: 2025-01-20 | End: 2025-01-20 | Stop reason: HOSPADM

## 2025-01-20 RX ORDER — ONDANSETRON 2 MG/ML
4 INJECTION INTRAMUSCULAR; INTRAVENOUS
Status: DISCONTINUED | OUTPATIENT
Start: 2025-01-20 | End: 2025-01-20 | Stop reason: HOSPADM

## 2025-01-20 RX ORDER — SODIUM CHLORIDE 9 MG/ML
INJECTION, SOLUTION INTRAVENOUS PRN
Status: DISCONTINUED | OUTPATIENT
Start: 2025-01-20 | End: 2025-01-20 | Stop reason: HOSPADM

## 2025-01-20 RX ORDER — MIDAZOLAM HYDROCHLORIDE 1 MG/ML
INJECTION, SOLUTION INTRAMUSCULAR; INTRAVENOUS
Status: DISCONTINUED | OUTPATIENT
Start: 2025-01-20 | End: 2025-01-20 | Stop reason: SDUPTHER

## 2025-01-20 RX ORDER — LIDOCAINE HYDROCHLORIDE 20 MG/ML
INJECTION, SOLUTION INFILTRATION; PERINEURAL
Status: DISCONTINUED | OUTPATIENT
Start: 2025-01-20 | End: 2025-01-20 | Stop reason: SDUPTHER

## 2025-01-20 RX ORDER — METOPROLOL SUCCINATE 25 MG/1
25 TABLET, EXTENDED RELEASE ORAL DAILY
Status: DISCONTINUED | OUTPATIENT
Start: 2025-01-21 | End: 2025-01-20 | Stop reason: HOSPADM

## 2025-01-20 RX ORDER — LEVOFLOXACIN 5 MG/ML
INJECTION, SOLUTION INTRAVENOUS
Status: DISCONTINUED | OUTPATIENT
Start: 2025-01-20 | End: 2025-01-20 | Stop reason: SDUPTHER

## 2025-01-20 RX ORDER — LIDOCAINE HYDROCHLORIDE 10 MG/ML
INJECTION, SOLUTION INFILTRATION; PERINEURAL PRN
Status: DISCONTINUED | OUTPATIENT
Start: 2025-01-20 | End: 2025-01-20 | Stop reason: ALTCHOICE

## 2025-01-20 RX ORDER — DIPHENHYDRAMINE HYDROCHLORIDE 50 MG/ML
12.5 INJECTION INTRAMUSCULAR; INTRAVENOUS
Status: DISCONTINUED | OUTPATIENT
Start: 2025-01-20 | End: 2025-01-20 | Stop reason: HOSPADM

## 2025-01-20 RX ORDER — METOPROLOL SUCCINATE 25 MG/1
25 TABLET, EXTENDED RELEASE ORAL DAILY
Qty: 30 TABLET | Refills: 0 | Status: ON HOLD | OUTPATIENT
Start: 2025-01-21

## 2025-01-20 RX ORDER — ATORVASTATIN CALCIUM 40 MG/1
40 TABLET, FILM COATED ORAL DAILY
Qty: 30 TABLET | Refills: 0 | Status: ON HOLD | OUTPATIENT
Start: 2025-01-20

## 2025-01-20 RX ADMIN — METOPROLOL TARTRATE 25 MG: 25 TABLET, FILM COATED ORAL at 08:45

## 2025-01-20 RX ADMIN — LIDOCAINE HYDROCHLORIDE 40 MG: 20 INJECTION, SOLUTION INFILTRATION; PERINEURAL at 11:15

## 2025-01-20 RX ADMIN — RISPERIDONE 1 MG: 1 TABLET, FILM COATED ORAL at 21:08

## 2025-01-20 RX ADMIN — PROPOFOL 75 MCG/KG/MIN: 10 INJECTION, EMULSION INTRAVENOUS at 11:15

## 2025-01-20 RX ADMIN — MIDAZOLAM 2 MG: 1 INJECTION INTRAMUSCULAR; INTRAVENOUS at 11:36

## 2025-01-20 RX ADMIN — SODIUM CHLORIDE: 9 INJECTION, SOLUTION INTRAVENOUS at 10:08

## 2025-01-20 RX ADMIN — PROPOFOL 20 MG: 10 INJECTION, EMULSION INTRAVENOUS at 11:13

## 2025-01-20 RX ADMIN — PROPOFOL 30 MG: 10 INJECTION, EMULSION INTRAVENOUS at 11:15

## 2025-01-20 RX ADMIN — LEVOFLOXACIN 750 MG: 5 INJECTION, SOLUTION INTRAVENOUS at 11:23

## 2025-01-20 ASSESSMENT — PAIN - FUNCTIONAL ASSESSMENT: PAIN_FUNCTIONAL_ASSESSMENT: 0-10

## 2025-01-20 NOTE — OP NOTE
Operative Note      Patient: Price Hernandez  YOB: 1965  MRN: [id number]    Date of Procedure: 1/20/2025    Pre-Op Diagnosis Codes:      * Osteomyelitis of left foot, unspecified type [M86.9]   Status post transmetatarsal amputation, tendo Achilles lengthening, bone biopsy (DOS 1/17/2025)  Diabetic foot infection, left  Gangrene, left foot  Type 2 diabetes mellitus    Post-Op Diagnosis: Same       Procedure(s):  DELAYED PRIMARY CLOSURE OF FOOT  Irrigation and debridement, left foot  Application of micro matrix skin graft substitute, left foot  Delayed primary closure, left foot    Surgeon(s):  Michelle Weems DPM    Assistant:   Resident: Cachorro Tipton DPM; Evan Blair DPM    Anesthesia: Monitor Anesthesia Care    Estimated Blood Loss (mL): Minimal    Complications: None    Specimens:   * No specimens in log *    Implants:  Implant Name Type Inv. Item Serial No.  Lot No. LRB No. Used Action   DRESSING WND MICRONIZED PARTIC 500 MG MATRISTEM MICROMATRIX - [id number]  DRESSING WND MICRONIZED PARTIC 500 MG MATRISTEM MICROMATRIX  [employer] SUSH[employer]WD [id number] Left 1 Implanted         Drains: * No LDAs found *    Findings:  Infection Present At Time Of Surgery (PATOS) (choose all levels that have infection present):  No infection present  Other Findings: Site of previous transmetatarsal amputation was left open.  Retention sutures were intact.  Wound was irrigated and debrided utilizing sterile curette and #15 blade.  Micro matrix was applied.  Wound was closed utilizing 2-0 PDS 3-0 Monocryl for deep closure and 2-0 and 3-0 Prolene for skin closure.    Detailed Description of Procedure:   Indications for procedure: Patient underwent transmetatarsal amputation of the left foot amputation on 1/17/2025 due to diabetic foot infection with gangrene.  Surgical site had been partially closed with retention sutures, otherwise left open for drainage. Patient requires OR for means

## 2025-01-20 NOTE — CARE COORDINATION
Bedside RN to transport patient to [facility] at 2000. Facility informed. Bedside nurse informed.     Number for Report: [phone]    Writer placed call to pt britt Zepeda for update. No answer, voicemail left for return call, writer provided [facility] nursing station number if call is returned after hours.  Electronically signed by ELLEN Ramon on 1/20/2025 at 5:05 PM

## 2025-01-20 NOTE — CARE COORDINATION
DISCHARGE PLANNING NOTE:    Writer is following for potential discharge placement. Pt has been accepted at [facility], no authorization necessary for admission. Pt is scheduled for surgery today.     Writer met with pt and britt Zepeda in room for update. All questions answered at this time. Writer provided contact information for any future questions.  Electronically signed by ELLEN Ramon on 1/20/2025 at 9:56 AM

## 2025-01-20 NOTE — ANESTHESIA POSTPROCEDURE EVALUATION
Department of Anesthesiology  Postprocedure Note    Patient: Price Hernandez  MRN: [id number]  YOB: 1965  Date of evaluation: 1/20/2025    Procedure Summary       Date: 01/20/25 Room / Location: [address]    Anesthesia Start: 1108 Anesthesia Stop: 1211    Procedure: DELAYED PRIMARY CLOSURE OF FOOT (Left: Foot) Diagnosis:       Osteomyelitis of left foot, unspecified type      (Osteomyelitis of left foot, unspecified type [M86.9])    Surgeons: Michelle Weems DPM Responsible Provider: Kevin Chin MD    Anesthesia Type: general, TIVA ASA Status: 3            Anesthesia Type: No value filed.    Roxie Phase I: Roxie Score: 10    Roxie Phase II:      Anesthesia Post Evaluation    Comments: POST- ANESTHESIA EVALUATION       Pt Name: Price Hernandez  MRN: [id number]  YOB: 1965  Date of evaluation: 1/20/2025  Time:  1:24 PM      BP (!) 131/93   Pulse 78   Temp 97.8 °F (36.6 °C)   Resp 12   Ht 1.829 m (6')   Wt 91.3 kg (201 lb 4.5 oz)   SpO2 99%   BMI 27.30 kg/m²      Consciousness Level  Awake  Cardiopulmonary Status  Stable  Pain Adequately Treated YES  Nausea / Vomiting  NO  Adequate Hydration  YES  Anesthesia Related Complications NONE      Electronically signed by Josiane Cummins MD on 1/20/2025 at 1:24 PM      No notable events documented.

## 2025-01-20 NOTE — CARE COORDINATION
Price Hernandez  [id number]  meets criteria for hypertension education. The following resources and interventions were provided: Diabetes Flyer education, Hypertension Flyer education, and Other (please specify) -  Writer able to locate 2 sons Katelyn De Jesus. Patient will be going to [facility] Discharge.  Son Katelyn will work on getting guardianship of patient.    Electronically signed by Meron Holguin RN on 1/20/2025 at 3:57 PM

## 2025-01-20 NOTE — PROGRESS NOTES
[facility] -   Infectious diseases evaluation  admission date 1/9/2025    reason for consultation:   Foot Gangrene    Impression :   Current:  Left Diabetic Foot Infection with osteomyelitis of the 1st and 2nd distal phalanxes /gas gangrene status post TMA with Achilles tendon lengthening 1/17/2020  Sepsis secondary to above  Multifocal pneumonia  Peripheral arterial disease  DM II  Schizoaffective disorder    HENCE:   Discontinue clindamycin   Discontinue IV meropenem  IV Levaquin, may change to oral on discharge/QTc 449  Follow CBC and renal function.  MRSA swab negative  Respiratory panel with COVID-19 negative on 1/15/2025  Follow final cultures.  Adjust Antibiotics.  Supportive care.      Infection Control Recommendations   [place] Precautions    Antimicrobial Stewardship Recommendations   Simplification of therapy  Targeted therapy    History of Present Illness:   Initial history:  Price Hernandez is a 59 y.o.-year-old male who came to the ED today because his feet have been hurting all week.  States he has had sores on his feet in the past and has been seen and treated at the [facility] on [place].  ED provider reports that patient is homeless and was found outside near the hospital.  He was brought in by police.  Patient noted to have multiple open areas on his left foot, with extremely foul odor and necrotic tissue.  Bilateral feet warm to touch; pulses are audible with Doppler.  Patient reports that he has been off all of his medications, including his insulin, for at least 7 months.  He has no desire to resume taking any the medications due to homelessness and states that he feels okay without them.  Currently denies hallucinations; states that he has mild hallucinations on occasion, but does not want to be seen by psychiatry and does not want started back on his psychiatric medications.  Patient is a poor historian and unable to provide further 
          [facility] -   Infectious diseases evaluation  admission date 1/9/2025    reason for consultation:   Foot Gangrene    Impression :   Current:  Left Diabetic Foot Infection with osteomyelitis of the 1st and 2nd distal phalanxes /gas gangrene  Sepsis secondary to above  Multifocal pneumonia  Peripheral arterial disease  DM II  Schizoaffective disorder    HENCE:   Clindamycin 600 mg IV every 8 hours  IV meropenem  Follow CBC and renal function.  MRSA swab negative  Respiratory panel with COVID-19 negative on 1/15/2025  TMA on Friday  Follow cultures.  Adjust Antibiotics.  Supportive care.      Infection Control Recommendations   [place] Precautions    Antimicrobial Stewardship Recommendations   Simplification of therapy  Targeted therapy    History of Present Illness:   Initial history:  Price Hernandez is a 59 y.o.-year-old male who came to the ED today because his feet have been hurting all week.  States he has had sores on his feet in the past and has been seen and treated at the [facility] on [place].  ED provider reports that patient is homeless and was found outside near the hospital.  He was brought in by police.  Patient noted to have multiple open areas on his left foot, with extremely foul odor and necrotic tissue.  Bilateral feet warm to touch; pulses are audible with Doppler.  Patient reports that he has been off all of his medications, including his insulin, for at least 7 months.  He has no desire to resume taking any the medications due to homelessness and states that he feels okay without them.  Currently denies hallucinations; states that he has mild hallucinations on occasion, but does not want to be seen by psychiatry and does not want started back on his psychiatric medications.  Patient is a poor historian and unable to provide further details about HPI.  Initial labs: Cre: 0.8, Procalcitonin: 0.25, CRP: 83.2,                     Interval 
          [facility] -   Infectious diseases evaluation  admission date 1/9/2025    reason for consultation:   Foot Gangrene    Impression :   Current:  Left Diabetic Foot Infection with osteomyelitis of the 1st and 2nd distal phalanxes with intraosseous gas in the 2nd distal phalanx.  Soft tissue gas along the first and second digits.  Focal fluid collection along the dorsum of the 2/3 metatarsal heads  measuring up to 1.4 cm.  Leukocytosis  Elevated Procalcitonin  Elevated CRP  Elevated ESR  DM II  Schizoaffective disorder    Other:    Discussion / summary of stay / plan of care/ Recommendations:     HENCE:   Clindamycin 600 mg IV every 8 hours, Zosyn 3.375 gm IV every 8 hours for now.  Follow CBC and renal function.  Podiatry planning for digit amputation vs TMA tentatively scheduled for Tuesday.  Follow Blood and Wound cx.  Adjust Antibiotics.  Supportive care.  Discussed with patient.    Infection Control Recommendations   [place] Precautions    Antimicrobial Stewardship Recommendations   Simplification of therapy  Targeted therapy    History of Present Illness:   Initial history:  Price Hernandez is a 59 y.o.-year-old male who came to the ED today because his feet have been hurting all week.  States he has had sores on his feet in the past and has been seen and treated at the [facility] on [place].  ED provider reports that patient is homeless and was found outside near the hospital.  He was brought in by police.  Patient noted to have multiple open areas on his left foot, with extremely foul odor and necrotic tissue.  Bilateral feet warm to touch; pulses are audible with Doppler.  Patient reports that he has been off all of his medications, including his insulin, for at least 7 months.  He has no desire to resume taking any the medications due to homelessness and states that he feels okay without them.  Currently denies hallucinations; states that he has mild 
          [facility] -   Infectious diseases evaluation  admission date 1/9/2025    reason for consultation:   Foot Gangrene    Impression :   Current:  Left Diabetic Foot Infection with osteomyelitis of the 1st and 2nd distal phalanxes /gas gangrene  Sepsis secondary to above  Peripheral arterial disease  DM II  Schizoaffective disorder    HENCE:   Clindamycin 600 mg IV every 8 hours  IV meropenem  Follow CBC and renal function.  Surgical intervention by podiatry, working on getting consent.  Follow cultures.  Adjust Antibiotics.  Supportive care.      Infection Control Recommendations   [place] Precautions    Antimicrobial Stewardship Recommendations   Simplification of therapy  Targeted therapy    History of Present Illness:   Initial history:  Price Hernandez is a 59 y.o.-year-old male who came to the ED today because his feet have been hurting all week.  States he has had sores on his feet in the past and has been seen and treated at the [facility] on [place].  ED provider reports that patient is homeless and was found outside near the hospital.  He was brought in by police.  Patient noted to have multiple open areas on his left foot, with extremely foul odor and necrotic tissue.  Bilateral feet warm to touch; pulses are audible with Doppler.  Patient reports that he has been off all of his medications, including his insulin, for at least 7 months.  He has no desire to resume taking any the medications due to homelessness and states that he feels okay without them.  Currently denies hallucinations; states that he has mild hallucinations on occasion, but does not want to be seen by psychiatry and does not want started back on his psychiatric medications.  Patient is a poor historian and unable to provide further details about HPI.  Initial labs: Cre: 0.8, Procalcitonin: 0.25, CRP: 83.2,                     Interval changes  1/14/2025   He is afebrile, temperature max 
     [facility] Internal Medicine  Alex Toro MD; Dandre Morris MD; Ari Agosto MD; MD Cassia Major MD; Priya Lynne MD    [facility] Internal Medicine   IN-PATIENT SERVICE   [facility]    HISTORY AND PHYSICAL EXAMINATION            Date:   1/12/2025  Patient name:  Price Hernandez  Date of admission:  1/9/2025  7:24 PM  MRN:   [id number]  Account:  [id number]  YOB: 1965  PCP:    Gary Cardona  Room:   2118/2118-01  Code Status:    Full Code    Chief Complaint:     Chief Complaint   Patient presents with    Foot Pain       History Obtained From:     Patient medical record nursing staff    History of Present Illness:     Price Hernandez is a 59 y.o. Non- / non  male who presents with Foot Pain   and is admitted to the hospital for the management of Sepsis ([facility]).    Price Hernandez is a 59 y.o. Non- / non  male who presents with Foot Pain   and is admitted to the hospital for the management of Sepsis ([facility]).     Patient's medical history significant for atherosclerosis of right leg with gangrene, noncompliance with medical treatment, osteomyelitis of right foot, PAD, schizoaffective disorder, tobacco abuse, and DM type II.     According to patient, he came to the ED today because his feet have been hurting all week.  States he has had sores on his feet in the past and has been seen and treated at the [facility] on [place].  ED provider reports that patient is homeless and was found outside near the hospital.  He was brought in by police.  Patient noted to have multiple open areas on his left foot, with extremely foul odor and necrotic tissue.  Bilateral feet warm to touch; pulses are audible with Doppler.  Patient reports that he has been off all of his medications, including his insulin, for at least 7 months.  He has no desire to resume taking any the medications due to homelessness and states that 
     [facility] Internal Medicine  Alex Toro MD; Dandre Morris MD; Ari Agosto MD; MD Cassia Major MD; Priya Lynne MD    [facility] Internal Medicine   IN-PATIENT SERVICE   [facility]    Progress note            Date:   1/17/2025  Patient name:  Price Hernandez  Date of admission:  1/9/2025  7:24 PM  MRN:   [id number]  Account:  [id number]  YOB: 1965  PCP:    Gary Cardona  Room:   2048/2048-01  Code Status:    Full Code    Chief Complaint:     Chief Complaint   Patient presents with    Foot Pain       History Obtained From:     Patient medical record nursing staff    History of Present Illness:     Price Hernandez is a 59 y.o. Non- / non  male who presents with Foot Pain   and is admitted to the hospital for the management of Sepsis ([facility]    Price Hernandez is a 59 y.o. Non- / non  male who presents with Foot Pain   and is admitted to the hospital for the management of Sepsis ([facility]).     Patient's medical history significant for atherosclerosis of right leg with gangrene, noncompliance with medical treatment, osteomyelitis of right foot, PAD, schizoaffective disorder, tobacco abuse, and DM type II.     According to patient, he came to the ED today because his feet have been hurting all week.  States he has had sores on his feet in the past and has been seen and treated at the [facility] on [place].  ED provider reports that patient is homeless and was found outside near the hospital.  He was brought in by police.  Patient noted to have multiple open areas on his left foot, with extremely foul odor and necrotic tissue.  Bilateral feet warm to touch; pulses are audible with Doppler.  Patient reports that he has been off all of his medications, including his insulin, for at least 7 months.  He has no desire to resume taking any the medications due to homelessness and states that he feels okay 
     [facility] Internal Medicine  Alex Toro MD; Dandre Morris MD; Ari Agosto MD; MD Cassia Major MD; Priya Lynne MD    [facility] Internal Medicine   IN-PATIENT SERVICE   [facility]    Progress note            Date:   1/18/2025  Patient name:  Price Hernandez  Date of admission:  1/9/2025  7:24 PM  MRN:   [id number]  Account:  [id number]  YOB: 1965  PCP:    Gary Cardona  Room:   2048/2048-01  Code Status:    Full Code    Chief Complaint:     Chief Complaint   Patient presents with    Foot Pain       History Obtained From:     Patient medical record nursing staff    History of Present Illness:     Price Hernandez is a 59 y.o. Non- / non  male who presents with Foot Pain   and is admitted to the hospital for the management of Sepsis ([facility]).    Price Hernandez is a 59 y.o. Non- / non  male who presents with Foot Pain   and is admitted to the hospital for the management of Sepsis ([facility]).     Patient's medical history significant for atherosclerosis of right leg with gangrene, noncompliance with medical treatment, osteomyelitis of right foot, PAD, schizoaffective disorder, tobacco abuse, and DM type II.     According to patient, he came to the ED today because his feet have been hurting all week.  States he has had sores on his feet in the past and has been seen and treated at the [facility] on [place].  ED provider reports that patient is homeless and was found outside near the hospital.  He was brought in by police.  Patient noted to have multiple open areas on his left foot, with extremely foul odor and necrotic tissue.  Bilateral feet warm to touch; pulses are audible with Doppler.  Patient reports that he has been off all of his medications, including his insulin, for at least 7 months.  He has no desire to resume taking any the medications due to homelessness and states that he feels okay 
     [facility] Internal Medicine  Alex Toro MD; Dandre Morris MD; Ari Agosto MD; MD Cassia Major MD; Priya Lynne MD    [facility] Internal Medicine   IN-PATIENT SERVICE   [facility]    Progress note            Date:   1/19/2025  Patient name:  Price Hernandez  Date of admission:  1/9/2025  7:24 PM  MRN:   [id number]  Account:  [id number]  YOB: 1965  PCP:    Gary Cardona  Room:   2048/2048-01  Code Status:    Full Code    Chief Complaint:     Chief Complaint   Patient presents with    Foot Pain       History Obtained From:     Patient medical record nursing staff    History of Present Illness:     Price Hernandez is a 59 y.o. Non- / non  male who presents with Foot Pain   and is admitted to the hospital for the management of Sepsis ([facility]).    Price Hernandez is a 59 y.o. Non- / non  male who presents with Foot Pain   and is admitted to the hospital for the management of Sepsis ([facility]).     Patient's medical history significant for atherosclerosis of right leg with gangrene, noncompliance with medical treatment, osteomyelitis of right foot, PAD, schizoaffective disorder, tobacco abuse, and DM type II.     According to patient, he came to the ED today because his feet have been hurting all week.  States he has had sores on his feet in the past and has been seen and treated at the [facility] on [place].  ED provider reports that patient is homeless and was found outside near the hospital.  He was brought in by police.  Patient noted to have multiple open areas on his left foot, with extremely foul odor and necrotic tissue.  Bilateral feet warm to touch; pulses are audible with Doppler.  Patient reports that he has been off all of his medications, including his insulin, for at least 7 months.  He has no desire to resume taking any the medications due to homelessness and states that he feels okay 
     [facility] Internal Medicine  Alex Toro MD; Dandre Mroris MD; Ari Agosto MD; MD Cassia Major MD; Priya Lynne MD    [facility] Internal Medicine   IN-PATIENT SERVICE   [facility]    Progress note            Date:   1/14/2025  Patient name:  Price Hernandez  Date of admission:  1/9/2025  7:24 PM  MRN:   [id number]  Account:  [id number]  YOB: 1965  PCP:    Gary Cardona  Room:   2048/2048-01  Code Status:    Full Code    Chief Complaint:     Chief Complaint   Patient presents with    Foot Pain       History Obtained From:     Patient medical record nursing staff    History of Present Illness:     Price Hernandez is a 59 y.o. Non- / non  male who presents with Foot Pain   and is admitted to the hospital for the management of Sepsis ([facility]).    Price Hernandez is a 59 y.o. Non- / non  male who presents with Foot Pain   and is admitted to the hospital for the management of Sepsis ([facility]).     Patient's medical history significant for atherosclerosis of right leg with gangrene, noncompliance with medical treatment, osteomyelitis of right foot, PAD, schizoaffective disorder, tobacco abuse, and DM type II.     According to patient, he came to the ED today because his feet have been hurting all week.  States he has had sores on his feet in the past and has been seen and treated at the [facility] on [place].  ED provider reports that patient is homeless and was found outside near the hospital.  He was brought in by police.  Patient noted to have multiple open areas on his left foot, with extremely foul odor and necrotic tissue.  Bilateral feet warm to touch; pulses are audible with Doppler.  Patient reports that he has been off all of his medications, including his insulin, for at least 7 months.  He has no desire to resume taking any the medications due to homelessness and states that he feels okay 
     [facility] Internal Medicine  Alex Toro MD; Dandre Morris MD; Ari Agosto MD; MD Cassia Major MD; Priya Lynne MD    [facility] Internal Medicine   IN-PATIENT SERVICE   [facility]    HISTORY AND PHYSICAL EXAMINATION            Date:   1/13/2025  Patient name:  Price Hernandez  Date of admission:  1/9/2025  7:24 PM  MRN:   [id number]  Account:  [id number]  YOB: 1965  PCP:    Gary Cardona  Room:   2118/2118-01  Code Status:    Full Code    Chief Complaint:     Chief Complaint   Patient presents with    Foot Pain       History Obtained From:     Patient medical record nursing staff    History of Present Illness:     Price Hernandez is a 59 y.o. Non- / non  male who presents with Foot Pain   and is admitted to the hospital for the management of Sepsis ([facility]).    Price Hernandez is a 59 y.o. Non- / non  male who presents with Foot Pain   and is admitted to the hospital for the management of Sepsis ([facility]).     Patient's medical history significant for atherosclerosis of right leg with gangrene, noncompliance with medical treatment, osteomyelitis of right foot, PAD, schizoaffective disorder, tobacco abuse, and DM type II.     According to patient, he came to the ED today because his feet have been hurting all week.  States he has had sores on his feet in the past and has been seen and treated at the [facility] on [place].  ED provider reports that patient is homeless and was found outside near the hospital.  He was brought in by police.  Patient noted to have multiple open areas on his left foot, with extremely foul odor and necrotic tissue.  Bilateral feet warm to touch; pulses are audible with Doppler.  Patient reports that he has been off all of his medications, including his insulin, for at least 7 months.  He has no desire to resume taking any the medications due to homelessness and states that 
   01/16/25 1115   Encounter Summary   Encounter Overview/Reason Spiritual/Emotional Needs   Service Provided For Patient   Referral/Consult From Palliative Care   Support System Children   Last Encounter  01/16/25   Complexity of Encounter Moderate   Spiritual/Emotional needs   Type Spiritual Support   Assessment/Intervention/Outcome   Assessment Calm;Coping   Intervention Active listening;Discussed illness injury and it’s impact;Explored/Affirmed feelings, thoughts, concerns;Prayer (assurance of)/[place];Sustaining Presence/Ministry of presence   Outcome Acceptance;Engaged in conversation;Expressed Gratitude       
  Progress Note  Foot and Ankle Surgery    CC/Reason for consult: Left foot wounds      Interval history:  Patient seen and examined at bedside  Patient states pain is controlled  Vitals reviewed, afebrile, tachycardic, hypertensive  Patient has been NPO for possible surgical intervention with Dr. Gandara today      Updated Labs:     WBC: 12.5 -> 12.3  Lab Results   Component Value Date    WBC 12.3 (H) 01/11/2025     ESR: 24 -> 16  Lab Results   Component Value Date    SEDRATE 16 01/10/2025     CRP: 106 -> 81  Lab Results   Component Value Date    CRP 81.0 (H) 01/11/2025         HPI:   See consultation note for HPI.     ROS: Denies N/V/F/C/SOB/CP.  Otherwise negative except at stated in the HPI in consultation note.       Vitals:    Vitals:    01/11/25 0700   BP: (!) 159/103   Pulse: (!) 101   Resp: 18   Temp: 99 °F (37.2 °C)   SpO2: 94%       I/O last 3 completed shifts:  In: -   Out: 900 [Urine:900]    Labs:  Recent Labs     01/10/25  0533 01/10/25  1703 01/11/25  0550   WBC 12.5*  --  12.3*   HGB 9.8*  --  10.1*   HCT 28.7*  --  30.2*     --  295   INR  --  1.2  --      --  139   K 3.9  --  3.9   BUN 15  --  11   CREATININE 0.7  --  0.7   GLUCOSE 141*  --  143*   SEDRATE 16  --   --    .0*  --  81.0*        CBC:  Recent Labs     01/09/25  2015 01/10/25  0533 01/11/25  0550   WBC 21.0* 12.5* 12.3*   HGB 10.4* 9.8* 10.1*   HCT 31.8* 28.7* 30.2*    268 295   CRP 83.2* 106.0* 81.0*        BMP:  Recent Labs     01/09/25  2015 01/10/25  0533 01/11/25  0550    140 139   K 3.9 3.9 3.9    109* 108*   CO2 26 24 23   BUN 13 15 11   CREATININE 0.8 0.7 0.7   GLUCOSE 191* 141* 143*   CALCIUM 8.3* 7.6* 8.0*        Coags:  Recent Labs     01/10/25  1703   INR 1.2       Lab Results   Component Value Date    SEDRATE 16 01/10/2025     Recent Labs     01/09/25  2015 01/10/25  0533 01/11/25  0550   CRP 83.2* 106.0* 81.0*       PE:   Gen: Alert and oriented, NAD, cooperative and pleasant.  Head: 
  Progress Note  Foot and Ankle Surgery    CC/Reason for consult: Left foot wounds      Interval history:  Patient seen and examined at bedside  Patient states pain is controlled  Vitals reviewed, afebrile, tachycardic, hypertensive  Patient refusing dressing changes   Patient has been refusing surgical intervention  Patient was deemed unable to consent for surgery by the psychiatry service.  Case management is working on guardianship process so that consent can be obtained for future surgical intervention.      Updated Labs:     WBC: 12.5 -> 12.3  Lab Results   Component Value Date    WBC 13.6 (H) 01/13/2025     ESR: 24 -> 16  Lab Results   Component Value Date    SEDRATE 31 (H) 01/13/2025     CRP: 106 -> 81  Lab Results   Component Value Date    CRP 56.7 (H) 01/13/2025         HPI:   See consultation note for HPI.     ROS: Denies N/V/F/C/SOB/CP.  Otherwise negative except at stated in the HPI in consultation note.       Vitals:    Vitals:    01/13/25 1230   BP: (!) 153/111   Pulse:    Resp:    Temp:    SpO2:        I/O last 3 completed shifts:  In: 550 [P.O.:350; I.V.:200]  Out: 2860 [Urine:2860]    Labs:  Recent Labs     01/10/25  1703 01/11/25  0550 01/12/25  0601 01/13/25  0539   WBC  --    < > 13.7* 13.6*   HGB  --    < > 10.8* 10.6*   HCT  --    < > 32.2* 31.9*   PLT  --    < > 308 329   INR 1.2  --   --   --    NA  --    < > 139  --    K  --    < > 3.8  --    BUN  --    < > 9  --    CREATININE  --    < > 0.7  --    GLUCOSE  --    < > 125*  --    SEDRATE  --   --   --  31*   CRP  --    < > 59.6* 56.7*    < > = values in this interval not displayed.        CBC:  Recent Labs     01/11/25  0550 01/12/25  0601 01/13/25  0539   WBC 12.3* 13.7* 13.6*   HGB 10.1* 10.8* 10.6*   HCT 30.2* 32.2* 31.9*    308 329   CRP 81.0* 59.6* 56.7*        BMP:  Recent Labs     01/11/25  0550 01/12/25  0601    139   K 3.9 3.8   * 105   CO2 23 23   BUN 11 9   CREATININE 0.7 0.7   GLUCOSE 143* 125*   CALCIUM 8.0* 
  Progress Note  Foot and Ankle Surgery    CC/Reason for consult: Left foot wounds      Interval history:  Patient seen and examined in preop, son Alex Ignacio at patient's side.  We discussed the plan to move forward with a transmetatarsal amputation and tendo Achilles lengthening of the left foot today.  All parties agree.  No new changes. Patient stable       Updated Labs:     WBC: 12.5 -> 12.3  Lab Results   Component Value Date    WBC 11.9 (H) 01/17/2025     ESR: 24 -> 16  Lab Results   Component Value Date    SEDRATE 37 (H) 01/17/2025     CRP: 106 -> 81  Lab Results   Component Value Date    CRP 17.7 (H) 01/17/2025         HPI:   See consultation note for HPI.     ROS: Denies N/V/F/C/SOB/CP.  Otherwise negative except at stated in the HPI in consultation note.       Vitals:    Vitals:    01/17/25 0929   BP: (!) 136/100   Pulse: 91   Resp: 18   Temp: 97.8 °F (36.6 °C)   SpO2: 92%       I/O last 3 completed shifts:  In: -   Out: 750 [Urine:750]    Labs:  Recent Labs     01/17/25  0553   WBC 11.9*   HGB 10.2*   HCT 30.2*         K 4.4   BUN 13   CREATININE 0.7   GLUCOSE 144*   SEDRATE 37*   CRP 17.7*        CBC:  Recent Labs     01/15/25  0652 01/16/25  0650 01/17/25  0553   WBC 11.3* 13.4* 11.9*   HGB 10.3* 10.5* 10.2*   HCT 30.5* 32.0* 30.2*    316 303   CRP 43.7* 25.8* 17.7*        BMP:  Recent Labs     01/17/25  0553      K 4.4      CO2 25   BUN 13   CREATININE 0.7   GLUCOSE 144*   CALCIUM 8.5*          Coags:  No results for input(s): \"APTT\", \"INR\" in the last 72 hours.    Invalid input(s): \"PROT\"      Lab Results   Component Value Date    SEDRATE 37 (H) 01/17/2025     Recent Labs     01/15/25  0652 01/16/25  0650 01/17/25  0553   CRP 43.7* 25.8* 17.7*       PE:   Gen: Alert and oriented, NAD, cooperative and pleasant.  Head: Normocephalic, atraumatic.  Cardiovascular: Tachycardic, normal rhythm.  Respiratory: Chest symmetric, no accessory muscle use.         PE: Left Lower 
  Progress Note  Foot and Ankle Surgery    CC/Reason for consult: Left foot wounds      Interval history:  Vitals reviewed, afebrile, tachycardic, hypertensive  Patient continues to refuse any type of dressing changes or local wound care.    Likewise patient has been refusing surgical intervention  Guardianship process in place, family member of the patient is supposed to meet with hospital administration this morning.  Will follow  Swab cultures growing gram-negative Acaligenes -patient switched to meropenem    Updated Labs:     WBC: 12.5 -> 12.3  Lab Results   Component Value Date    WBC 13.6 (H) 01/14/2025     ESR: 24 -> 16  Lab Results   Component Value Date    SEDRATE 38 (H) 01/14/2025     CRP: 106 -> 81  Lab Results   Component Value Date    CRP 58.5 (H) 01/14/2025         HPI:   See consultation note for HPI.     ROS: Denies N/V/F/C/SOB/CP.  Otherwise negative except at stated in the HPI in consultation note.       Vitals:    Vitals:    01/14/25 0633   BP: (!) 148/92   Pulse: (!) 102   Resp: 18   Temp: 98.4 °F (36.9 °C)   SpO2: 96%       I/O last 3 completed shifts:  In: 620 [P.O.:470; IV Piggyback:150]  Out: 1480 [Urine:1480]    Labs:  Recent Labs     01/12/25  0601 01/13/25  0539 01/14/25  0558   WBC 13.7*   < > 13.6*   HGB 10.8*   < > 10.5*   HCT 32.2*   < > 31.3*      < > 319     --   --    K 3.8  --   --    BUN 9  --   --    CREATININE 0.7  --   --    GLUCOSE 125*  --   --    SEDRATE  --    < > 38*   CRP 59.6*   < > 58.5*    < > = values in this interval not displayed.        CBC:  Recent Labs     01/12/25  0601 01/13/25  0539 01/14/25  0558   WBC 13.7* 13.6* 13.6*   HGB 10.8* 10.6* 10.5*   HCT 32.2* 31.9* 31.3*    329 319   CRP 59.6* 56.7* 58.5*        BMP:  Recent Labs     01/12/25  0601      K 3.8      CO2 23   BUN 9   CREATININE 0.7   GLUCOSE 125*   CALCIUM 8.6        Coags:  No results for input(s): \"APTT\", \"INR\" in the last 72 hours.    Invalid input(s): 
  Progress Note  Foot and Ankle Surgery    CC/Reason for consult: Left foot wounds  S/p TMA with tendoachilles lengthening, left foot (DOS: 01/17/25)      Interval history:  Patient seen and examined at bedside  Afebrile, vital signs stable  Patient states he is having pain to the foot. Percocet panel added prn.   Patient scheduled for DPC on Monday (01/20/25) at 10:45 AM      Updated Labs:     WBC:   Lab Results   Component Value Date    WBC 13.2 (H) 01/19/2025     ESR:   Lab Results   Component Value Date    SEDRATE 49 (H) 01/19/2025     CRP:   Lab Results   Component Value Date    CRP 32.9 (H) 01/19/2025         HPI:   The patient is a 59 y.o. male seen at [facility] for left foot wounds.  The patient states that he noticed pain beginning last week but did not initially notice a wound.  He has discoloration of his toes and a foul odor that he began to notice a few days ago.  Upon presentation to the ED, patient was tachycardic and febrile meeting sepsis criteria.  Patient has a history of uncontrolled diabetes most recent hemoglobin A1c was 6.7% (01/10/25) down from 18.9% (11/26/22).  The patient states he is currently homeless and receiving minimal medical care.  PMH significant for schizoaffective disorder, HLD, JONATHAN, ventral hernia, PAD.  Patient states that he smokes cigarettes, approximately 0.25 packs per day. He cannot recall how long he has been smoking for but states it has been a long time.  Patient denies illicit drug use.  Patient denies currently taking blood thinners.  To the patient's knowledge, he has received no vascular intervention in the past.  Chart review shows patient was seen by Dr. Arredondo on 01/31/23 for gangrene of the right foot and PAD. Dr. Arredondo recommended that he undergo an angiogram with runoff with possible intervention on the right to help improve the blood flow to the right foot in the out patient setting.  It is unclear if the patient followed up with Dr. Arredondo. 
  Progress Note  Foot and Ankle Surgery    CC/Reason for consult: Left foot wounds  S/p TMA with tendoachilles lengthening, left foot (DOS: 01/17/25)      Interval history:  Patient seen and examined at bedside with his son present  Afebrile, vital signs stable  Patient states pain is well-controlled at this time  Postoperative dressings intact to left foot, no strikethrough appreciated  Patient scheduled for DPC on Monday (01/20/25) at 10:45 AM      Updated Labs:     WBC:   Lab Results   Component Value Date    WBC 11.9 (H) 01/17/2025     ESR:   Lab Results   Component Value Date    SEDRATE 37 (H) 01/17/2025     CRP:   Lab Results   Component Value Date    CRP 17.7 (H) 01/17/2025         HPI:   The patient is a 59 y.o. male seen at [facility] for left foot wounds.  The patient states that he noticed pain beginning last week but did not initially notice a wound.  He has discoloration of his toes and a foul odor that he began to notice a few days ago.  Upon presentation to the ED, patient was tachycardic and febrile meeting sepsis criteria.  Patient has a history of uncontrolled diabetes most recent hemoglobin A1c was 6.7% (01/10/25) down from 18.9% (11/26/22).  The patient states he is currently homeless and receiving minimal medical care.  PMH significant for schizoaffective disorder, HLD, JONATHAN, ventral hernia, PAD.  Patient states that he smokes cigarettes, approximately 0.25 packs per day. He cannot recall how long he has been smoking for but states it has been a long time.  Patient denies illicit drug use.  Patient denies currently taking blood thinners.  To the patient's knowledge, he has received no vascular intervention in the past.  Chart review shows patient was seen by Dr. Arredondo on 01/31/23 for gangrene of the right foot and PAD. Dr. Arredondo recommended that he undergo an angiogram with runoff with possible intervention on the right to help improve the blood flow to the right foot in the out 
30 for today BEHAVIORAL HEALTH FOLLOW-UP NOTE     1/15/2025     Patient was seen and examined in person, Chart reviewed   Patient's case discussed with staff/team    Chief Complaint: Septicemia, Paranoid schizophrenia     Interim History:     Patient evaluated at bedside. He was started on Risperidone 1 mg BID yesterday. He reports feeling improved. Patient still does not know why he is hospitalized. Currently denies any pain any where. Denies any SI, HI, or hallucinations.  Oriented to self and place. SW currently working on [employer] papers with the patient's sons.     BP (!) 157/107   Pulse 100   Temp 98.6 °F (37 °C) (Oral)   Resp 16   Ht 1.854 m (6' 0.99\")   Wt 88.6 kg (195 lb 5.2 oz)   SpO2 95%   BMI 25.78 kg/m²   Appetite:   [x] Normal/Unchanged  [] Increased  [] Decreased      Sleep:       [x] Normal/Unchanged  [] Fair       [] Poor              Energy:    [x] Normal/Unchanged  [] Increased  [] Decreased        Aggression:  [] yes  [x] no    Patient is [] able  [x] unable to CONTRACT FOR SAFETY ON THE UNIT    PAST MEDICAL/PSYCHIATRIC HISTORY:   Past Medical History:   Diagnosis Date    Acute encephalopathy     Acute kidney injury (HCC)     Homelessness     Hyperglycemia     Hyperlipidemia     Ketonemia     Lactic acidosis     Noncompliance     Schizoaffective disorder (HCC)     Tobacco abuse     Type 2 diabetes mellitus with hyperosmolar nonketotic hyperglycemia (HCC) 10/02/2016    Ventral hernia     Wound of abdomen     Wound of left ankle        FAMILY/SOCIAL HISTORY:  Family History   Problem Relation Age of Onset    Cancer Mother     Cancer Father      Social History     Socioeconomic History    Marital status:      Spouse name: Not on file    Number of children: Not on file    Years of education: Not on file    Highest education level: Not on file   Occupational History    Not on file   Tobacco Use    Smoking status: Every Day     Current packs/day: 0.25     Types: Cigarettes    
30 for today BEHAVIORAL HEALTH FOLLOW-UP NOTE     1/16/2025     Patient was seen and examined in person, Chart reviewed   Patient's case discussed with staff/team    Chief Complaint: Septicemia, Paranoid schizophrenia     Interim History:     Patient evaluated at bedside. He is currently siting up in his chair. Appears more coherent and alert than yesterday but still not fully capable to be making medical decisions due to lack of judgement and understanding of consequences. Patient is now aware why he is in the hospital in regards to his foot infection. He was started on Risperidone 1 mg BID 2 days ago, He reports feeling improved. Denies any SI, HI, or hallucinations.  Oriented to self, place, and situation. Plan for the patient to go to OR.     /80   Pulse 94   Temp 97.9 °F (36.6 °C) (Oral)   Resp 16   Ht 1.854 m (6' 0.99\")   Wt 88.6 kg (195 lb 5.2 oz)   SpO2 95%   BMI 25.78 kg/m²   Appetite:   [x] Normal/Unchanged  [] Increased  [] Decreased      Sleep:       [x] Normal/Unchanged  [] Fair       [] Poor              Energy:    [x] Normal/Unchanged  [] Increased  [] Decreased        Aggression:  [] yes  [x] no    Patient is [] able  [x] unable to CONTRACT FOR SAFETY ON THE UNIT    PAST MEDICAL/PSYCHIATRIC HISTORY:   Past Medical History:   Diagnosis Date    Acute encephalopathy     Acute kidney injury (HCC)     Homelessness     Hyperglycemia     Hyperlipidemia     Ketonemia     Lactic acidosis     Noncompliance     Schizoaffective disorder (HCC)     Tobacco abuse     Type 2 diabetes mellitus with hyperosmolar nonketotic hyperglycemia (HCC) 10/02/2016    Ventral hernia     Wound of abdomen     Wound of left ankle        FAMILY/SOCIAL HISTORY:  Family History   Problem Relation Age of Onset    Cancer Mother     Cancer Father      Social History     Socioeconomic History    Marital status:      Spouse name: Not on file    Number of children: Not on file    Years of education: Not on file    
Accu check 126  
Amy Marie (palliative) notified of new consult.   
Attempted to call report to med surg. No answer after being placed on hold to find nurse.     350 pm - report called to Crystal on med surg.    Pt to be transferred to med surg 2048   
Attempted to contact sister jluis. Phone went straight to . HIPAA compliant  left with call back number.   
Came to evaluate patient, patient gone to OR.  Will evaluate later.  
[facility]   Pharmacy Pharmacokinetic Monitoring Service - Vancomycin    Consulting Provider: Brett West DO   Indication: SSTI/ Sepsis  Target Concentration: Goal AUC/LIMA 400-600 mg*hr/L  Day of Therapy: 3  Additional Antimicrobials: Zosyn.    Pertinent Laboratory Values:   Wt Readings from Last 1 Encounters:   01/10/25 95 kg (209 lb 7 oz)     Temp Readings from Last 1 Encounters:   01/11/25 99 °F (37.2 °C) (Oral)     Estimated Creatinine Clearance: 132 mL/min (based on SCr of 0.7 mg/dL).  Recent Labs     01/10/25  0533 01/11/25  0550   CREATININE 0.7 0.7   BUN 15 11   WBC 12.5* 12.3*     Procalcitonin: 0.25    Pertinent Cultures:  Culture Date Source Results   01/10 Foot FEW GRAM POSITIVE COCCI IN PAIRS    MRSA Nasal Swab: N/A. Non-respiratory infection.    Recent vancomycin administrations                     vancomycin (VANCOCIN) 1250 mg in 250 mL IVPB (mg) 1,250 mg New Bag 01/10/25 2152     1,250 mg New Bag  0930    vancomycin (VANCOCIN) 2,500 mg in sodium chloride 0.9 % 500 mL IVPB (mg) 2,500 mg New Bag 01/09/25 2134                    Assessment:  Date/Time Current Dose Concentration Timing of Concentration (h) AUC   01/11 0550 1250 mg q12h 11.9 mg/L 7 h 58 m 400 mg/L.hr   Note: Serum concentrations collected for AUC dosing may appear elevated if collected in close proximity to the dose administered, this is not necessarily an indication of toxicity    Plan:  Current dosing regimen is sub-therapeutic  Increase dose to 1500 mg q12h.  Repeat vancomycin concentration ordered for 01/12 @ 0600   Pharmacy will continue to monitor patient and adjust therapy as indicated    Thank you for the consult,  Edgard Cordova RPH  1/11/2025 6:49 AM    
[facility]   Pharmacy Pharmacokinetic Monitoring Service - Vancomycin    Consulting Provider: Dr. West   Indication: Sepsis  Target Concentration: Goal AUC/LIMA 400-600 mg*hr/L  Day of Therapy: 2  Additional Antimicrobials: Zosyn    Pertinent Laboratory Values:   Wt Readings from Last 1 Encounters:   01/10/25 95 kg (209 lb 7 oz)     Temp Readings from Last 1 Encounters:   01/10/25 98.7 °F (37.1 °C) (Oral)     Estimated Creatinine Clearance: 132 mL/min (based on SCr of 0.7 mg/dL).  Recent Labs     01/09/25  2015 01/10/25  0533   CREATININE 0.8 0.7   BUN 13 15   WBC 21.0* 12.5*     Procalcitonin: 0.25    Pertinent Cultures:  See Micro  MRSA Nasal Swab: N/A. Non-respiratory infection.    Recent vancomycin administrations                     vancomycin (VANCOCIN) 2,500 mg in sodium chloride 0.9 % 500 mL IVPB (mg) 2,500 mg New Bag 01/09/25 2134                    Assessment:    Regimen: 1250 mg IV every 12 hours.  Start time: 09:00 on 01/10/2025  Exposure target: AUC24 (range)400-600 mg/L.hr   OMD53-05: 390 mg/L.hr  AUC24,ss: 407 mg/L.hr  Probability of AUC24 > 400: 52 %  Ctrough,ss: 9.3 mg/L  Probability of Ctrough,ss > 20: 2 %  Note: Serum concentrations collected for AUC dosing may appear elevated if collected in close proximity to the dose administered, this is not necessarily an indication of toxicity    Plan:  Current dosing regimen is therapeutic  Continue current dose  Repeat vancomycin concentration ordered for 01/11 @ 0600   Pharmacy will continue to monitor patient and adjust therapy as indicated    Thank you for the consult,  Artur Patel RPH  1/10/2025 8:42 AM    
[facility]   Pharmacy Pharmacokinetic Monitoring Service - Vancomycin     Price Hernandez is a 59 y.o. male starting on vancomycin therapy for sepsis, SSTI. Pharmacy consulted by Dr. West for monitoring and adjustment.    Target Concentration: Goal AUC/LIMA 400-600 mg*hr/L    Additional Antimicrobials: piperacillin-tazobactam     Pertinent Laboratory Values:   Wt Readings from Last 1 Encounters:   01/09/25 95.3 kg (210 lb)     Temp Readings from Last 1 Encounters:   01/09/25 100.2 °F (37.9 °C) (Oral)     Estimated Creatinine Clearance: 115 mL/min (based on SCr of 0.8 mg/dL).  Recent Labs     01/09/25 2015   CREATININE 0.8   BUN 13   WBC 21.0*     Procalcitonin: 0.25    Pertinent Cultures:  Culture Date Source Results   1/9/25 Blood Pending   MRSA Nasal Swab: N/A. Non-respiratory infection.    Plan:  Dosing recommendations based on Bayesian software  Start vancomycin 2500 mg x1 dose then 1250 mg every 12 hours  Anticipated AUC of 461 and trough concentration of 12.4 at steady state  Renal labs as indicated   Vancomycin concentration ordered for 1/11/25 @ 0600   Pharmacy will continue to monitor patient and adjust therapy as indicated    Thank you for the consult,  Anju Duggan RPH  1/9/2025 9:21 PM   
Communications  Pharmacy reached out infectious disease about culture sensitivities.     See Below for message:  1/13/25 11:58 AM   [phone] [place]ARMACY [place] Artur Patel [facility] [place] TORRIE ADDISON RM: [id number] Hello, Patient culture sensitivies resulted showing resistance to Zosyn. Patient currently on Zosyn and Cleocin. Sensitive to imipenem, could we change patient therapy to merrem? Thank you!  Read 12:27 PM    Still awaiting for response.      
Comprehensive Nutrition Assessment    Type and Reason for Visit:  Initial, Consult, Wound (Diabetes with poor compliance)    Nutrition Recommendations/Plan:   Continue current diet.  Provide Glucerna once daily.     Malnutrition Assessment:  Malnutrition Status:  At risk for malnutrition (Homeless with wounds) (01/10/25 0629)    Context:  Acute Illness     Findings of the 6 clinical characteristics of malnutrition:  Energy Intake:  Unable to assess  Weight Loss:  Unable to assess     Body Fat Loss:  Unable to assess     Muscle Mass Loss:  Unable to assess    Fluid Accumulation:  Mild (Mild to moderate) Extremities   Strength:  Not Performed    Nutrition Assessment:    Plan for left foot toe amputation, awaiting for family consent since pt was deemed incapable to make medical decision at this time. Pt reporting eating most of the meals provided. Observed Glucerna piling up at pt's table- will modify order from with meals to once daily.    Nutrition Related Findings:    Labs: POC Gluc 142, WBC 13.6. Edema (1/13): trace RLE/LLE. BM 1/12. Merrem. Wound Type: Diabetic Ulcer (Full-thickness ulceration with exposed subcutaneous tissue, left foot; gangrene.)       Current Nutrition Intake & Therapies:    Average Meal Intake: %, 51-75%  Average Supplements Intake: 26-50%  ADULT DIET; Regular; 4 carb choices (60 gm/meal)  ADULT ORAL NUTRITION SUPPLEMENT; Breakfast; Diabetic Oral Supplement    Anthropometric Measures:  Height: 185.4 cm (6' 0.99\")  Ideal Body Weight (IBW): 184 lbs (84 kg)    Admission Body Weight: 95 kg (209 lb 7 oz)  Current Body Weight: 88.6 kg (195 lb 5.2 oz), 113.8 % IBW. Weight Source: Bed scale  Current BMI (kg/m2): 25.8           Weight Adjustment For: No Adjustment                 BMI Categories: Overweight (BMI 25.0-29.9)    Estimated Daily Nutrient Needs:  Energy Requirements Based On: Formula  Weight Used for Energy Requirements: Admission  Energy (kcal/day): 3804-9133 based on [place] 
DATE: 2025    NAME: Price Hernandez  MRN: [id number]   : 1965    Patient not seen this date for Physical Therapy due to:      [] Cancel by RN or physician due to:    [] Hemodialysis    [] Critical Lab Value Level     [] Blood transfusion in progress    [] Acute or unstable cardiovascular status   _MAP < 55 or more than >115  _HR < 40 or > 130    [] Acute or unstable pulmonary status   -FiO2 > 60%   _RR < 5 or >40    _O2 sats < 85%    [] Strict Bedrest    [x] Off Unit for surgery or procedure, left foot delayed primary closure of surgical site.    [] Off Unit for testing       [] Pending imaging to R/O fracture    [] Refusal by Patient      [] Other      [] PT being discontinued at this time. Patient independent. No further needs.     [] PT being discontinued at this time as the patient has been transferred to hospice care. No further needs.      Marily Enriquez, PTA   
Dr Khalil confirms [facility].   
Dr Mic barrow with discharge to LTACH on five days 750 mg oral Levaquin.   
Dr. Agosto notified regarding pt request for medication for a cough.  
Dr. Gandara (Vascular Surgery) notified of consult.  
Dr. Haile (Infectious Diseases) notified of consult.  
[facility]   OCCUPATIONAL THERAPY MISSED TREATMENT NOTE   INPATIENT   Date: 25  Patient Name: Price Hernandez       Room:   MRN: [id number]   Account #: [id number]    : 1965  (59 y.o.)  Gender: male                 REASON FOR MISSED TREATMENT:  OT evaluation deferred at this time. Procedure scheduled at 10:15 for Left TRANSMETATARSAL AMPUTATION FOOT WITH ACHILLES TENDON LENGTHENING LEFT. OT will evaluate post-operatively once appropriate.    -    0820        Electronically signed by ZECHARIAH Mancilla on 25 at 8:35 AM EST   
Occupational Therapy  [facility]   INPATIENT OCCUPATIONAL THERAPY  PROGRESS NOTE  Date: 2025  Patient Name: Price Hernandez       Room:   MRN: [id number]    : 1965  (59 y.o.)  Gender: male             Discharge Recommendations:  Further Occupational Therapy is recommended upon facility discharge.    OT Equipment Recommendations  Mobility Devices: Walker  Walker: Rolling  Other: CTA pending progress    Restrictions/Precautions  Restrictions/Precautions  Restrictions/Precautions: General Precautions;Weight Bearing  Activity Level: Up with Assist;Up as Tolerated  Required Braces or Orthoses?: No  Lower Extremity Weight Bearing Restrictions  Left Lower Extremity Weight Bearing: Partial Weight Bearing  Partial Weight Bearing Percentage Or Pounds: PWB through L heel; per Dr. Wicho Atkinson DPM on 25: \"He doesn’t need surgical shoe as of now. If he is more comfortable in it, he can use it. No restrictions on right side\"  Position Activity Restriction  Other Position/Activity Restrictions: PWB through L heel; per Dr. Wicho Atkinson DPM on 25: \"He doesn’t need surgical shoe as of now. If he is more comfortable in it, he can use it. No restrictions on right side\"         Subjective  Subjective  Subjective: pt sitting up in bed watchingTV. pt  agreeable to participate c therapy  Pain  Pre-Pain: 0  Post-Pain: 0       Objective  Orientation  Overall Orientation Status: Within Normal Limits  Orientation Level: Oriented X4  Cognition  Overall Cognitive Status: Exceptions  Arousal/Alertness: Delayed responses to stimuli  Following Commands: Follows multistep commands with repitition;Follows multistep commands with increased time  Attention Span: Appears intact  Memory: Appears intact  Safety Judgement: Decreased awareness of need for assistance;Decreased awareness of need for safety  Problem Solving: Assistance required to identify errors made;Assistance required to 
Occupational Therapy Initial Evaluation  Facility/Department: [facility] MED SURG   Patient Name: Price Hernandez        MRN: [id number]    : 1965    Date of Service: 2025    Chief Complaint   Patient presents with    Foot Pain     Past Medical History:  has a past medical history of Acute encephalopathy, Acute kidney injury (HCC), Homelessness, Hyperglycemia, Hyperlipidemia, Ketonemia, Lactic acidosis, Noncompliance, Schizoaffective disorder (HCC), Tobacco abuse, Type 2 diabetes mellitus with hyperosmolar nonketotic hyperglycemia (HCC), Ventral hernia, Wound of abdomen, and Wound of left ankle.  Past Surgical History:  has a past surgical history that includes Arm Surgery.    Discharge Recommendations  Discharge Recommendations: Patient would benefit from continued therapy after discharge  OT Equipment Recommendations  Mobility Devices: Walker  Walker: Rolling  Other: CTA pending progress    Assessment  Performance deficits / Impairments: Decreased functional mobility ;Decreased ADL status;Decreased strength;Decreased safe awareness;Decreased endurance;Decreased sensation;Decreased balance;Decreased high-level IADLs;Decreased cognition  Assessment: Pt presents with decreased ADL IND secondary to deficits noted above. At baseline, pt is IND with ADLs and functional mobility. Currently, pt is requiring CGA for transfers and functional mobility with use of RW, CGA A for LB ADLs and toileting tasks. Pt is currently PWB to LLE, limiting overall standing balance/safety during functional mobility during ADLs. Continued OT services are warranted while pt is hospitalized and upon d/c to maximize IND and safety with functional tasks.  Prognosis: Good  Decision Making: Medium Complexity  REQUIRES OT FOLLOW-UP: Yes  Activity Tolerance  Activity Tolerance: Patient Tolerated treatment well  Safety Devices  Type of Devices: Call light within reach;Left in bed;Gait belt;Nurse notified  Restraints  Restraints Initially in 
Patient has poor understanding of the disease in my clinical opinion patient does not have the capacity to make a decision on this complex matter  Nursing staff tried to reach power of  Sister who could not be reached message left  Pt should not leave   Psych consulted for capacity jessica Agosto MD  1/10/2025    
Per Dr. Agosto's request and with patient consent, writer called sister Divya at [phone] to provide update on pt condition and refusal of treatment. Call went straight to voicemail, message left. Patient Sisters phone number provided to Dr. Gandara.  
Per Will, pharmacist, patient cultures came back. Wound is resistant to zosyn. Requesting Merrem. Pharmacist attempted to notify Dr. Haile earlier with no response. RN Attempted to call Dr. Haile for change. No answer. Left message via text/perfect serve.     1745: Per Dr. Haile, keep cleocin on. Discontinue zosyn due to Alcaligenes organism noted. Start Merrem 1G q8h for infection.   
Pharmacy Medication History Note      List of current medications patient is taking is complete.    Source of information: patient, Sure Scripts, OARRS, Care Everywhere     Changes made to medication list:  Medications removed (include reason, ex. therapy complete or physician discontinued, noncompliance):  Norco - course complete, no fills on OARRS in the past 2 years    Medications flagged for provider review:  Aspirin - patient reports not taking   Atorvastatin - patient reports not taking   Lantus - patient reports not taking   Humalog - patient reports not taking   Nicotine patches - patient reports not taking   Olanzapine - patient reports not taking   Rosuvastatin - patient reports not taking     Medications added/doses adjusted:  Ibuprofen 400 mg every 6 hours as needed   Acetaminophen 1000 mg every 6 hours as needed     Other notes (ex. Recent course of antibiotics, Coumadin dosing):  OARRS negative   Patient reports taking no prescription medications.       Current Home Medication List at Time of Admission:  Prior to Admission medications    Medication Sig   ibuprofen (ADVIL;MOTRIN) 200 MG tablet Take 2 tablets by mouth every 6 hours as needed for Pain   acetaminophen (TYLENOL) 500 MG tablet Take 2 tablets by mouth every 6 hours as needed for Pain   atorvastatin (LIPITOR) 80 MG tablet Take 80 mg by mouth daily  Patient not taking: Reported on 1/9/2025   aspirin EC 81 MG EC tablet Take 1 tablet by mouth daily  Patient not taking: Reported on 1/9/2025   insulin glargine (LANTUS SOLOSTAR) 100 UNIT/ML injection pen Inject 25 Units into the skin 2 times daily  Patient not taking: Reported on 1/9/2025   insulin lispro, 1 Unit Dial, (HUMALOG KWIKPEN) 100 UNIT/ML SOPN Inject 10 Units into the skin 3 times daily (before meals)  Patient not taking: Reported on 1/9/2025   nicotine (NICODERM CQ) 21 MG/24HR Place 1 patch onto the skin daily  Patient not taking: Reported on 1/9/2025   OLANZapine (ZYPREXA) 5 MG tablet 
Physical Therapy        Physical Therapy Cancel Note      DATE: 2025    NAME: Price Hernandez  MRN: [id number]   : 1965      Patient not seen this date for Physical Therapy due to:    Surgery/Procedure: Scheduled at 10:15 for Left TRANSMETATARSAL AMPUTATION FOOT WITH ACHILLES TENDON LENGTHENING LEFT. PT to follow and re-assess for needs post-operatively . 802 AM      Electronically signed by Karen Bermeo PT on 2025 at 8:01 AM      
Physical Therapy  [facility]   Physical Therapy Evaluation  Date: 25  Patient Name: Price Hernandez       Room: 8/2118-01  MRN: [id number]  Account: [id number]   : 1965  (59 y.o.) Gender: male     Discharge Recommendations:  Further Physical Therapy is recommended upon facility discharge    PT D/C Equipment  Equipment Needed: Yes  Walker: Rolling     Past Medical History:  has a past medical history of Acute encephalopathy, Acute kidney injury (HCC), Homelessness, Hyperglycemia, Hyperlipidemia, Ketonemia, Lactic acidosis, Noncompliance, Schizoaffective disorder (HCC), Tobacco abuse, Type 2 diabetes mellitus with hyperosmolar nonketotic hyperglycemia (HCC), Ventral hernia, Wound of abdomen, and Wound of left ankle.  Past Surgical History:   has a past surgical history that includes Arm Surgery.    Subjective  Subjective  Subjective: pt is in bed and He and RN are agreeable to therapy this date.   General  Patient assessed for rehabilitation services?: Yes  Follows Commands: Within Functional Limits     Pain  Pre-Pain: 0  Post-Pain: 0       Social/Functional History  Social/Functional History  Lives With:  (Homeless)  Type of Home:  (Homeless)  Additional Comments: pt reports that his mode of transportation is a bike. He reports that he has family living in the area. Pt is interested in group homes    Restrictions  Restrictions/Precautions  Restrictions/Precautions: General Precautions, Weight Bearing  Activity Level: Up with Assist  Lower Extremity Weight Bearing Restrictions  Partial Weight Bearing Percentage Or Pounds: WB through heel  Partial Weight Bearing Percentage Or Pounds:  (WB through heel)     Objective    Transfers  Transfers  Sit to Stand: Supervision  Stand to Sit: Supervision  Bed to Chair: Supervision  Stand Pivot Transfers: Supervision     Ambulation  Ambulation  WB Status: WB through heels   Ambulation  Surface: Level tile  Device: Rolling 
Physical Therapy  [facility]   Physical Therapy Re-Evaluation  Date: 25  Patient Name: Price Hernandez       Room: -  MRN: [id number]  Account: [id number]   : 1965  (59 y.o.) Gender: male     Discharge Recommendations:  Discharge Recommendations: Therapy recommended at discharge     PT D/C Equipment  Equipment Needed: Yes  Walker: Rolling     Past Medical History:  has a past medical history of Acute encephalopathy, Acute kidney injury (HCC), Homelessness, Hyperglycemia, Hyperlipidemia, Ketonemia, Lactic acidosis, Noncompliance, Schizoaffective disorder (HCC), Tobacco abuse, Type 2 diabetes mellitus with hyperosmolar nonketotic hyperglycemia (HCC), Ventral hernia, Wound of abdomen, and Wound of left ankle.  Past Surgical History:   has a past surgical history that includes Arm Surgery and Foot surgery (Left, 2025).    Subjective  Subjective  Subjective: Upon arrival, pt in restroom without assistive device, not accompanied by staff. Appears that patient has walked self to restroom, non-compliant with NWB restriction. PT/OT educate pt on NWB status to LLE, pt verbalizes understanding.     General  Chart Reviewed: Yes  Patient assessed for rehabilitation services?: Yes  Response To Previous Treatment: Patient with no complaints from previous session.  Referring Practitioner: Sophia Barrow DPM  Referral Date : 25  Diagnosis: Sepsis; PVD; Left foot infection s/p Left Transmetatarsal amputation     Pain  Pre-Pain: 4  Pain Location: Left, Foot  Pain Interventions: Rest, Repositioning (and elevation)     Social/Functional History  Social/Functional History  Lives With: Other (Comment) (homeless)  Type of Home: Homeless  Has the patient had two or more falls in the past year or any fall with injury in the past year?: No  Receives Help From: Family  Prior Level of Assist for ADLs: Independent  Prior Level of Assist for Homemaking: Independent  Prior Level of 
Physical Therapy  [facility]   Physical Therapy Treatment  Date: 25  Patient Name: Price Hernandez       Room: /8-01  MRN: [id number]  Account: [id number]   : 1965  (59 y.o.) Gender: male     Discharge Recommendations:  Further Physical Therapy is recommended upon facility discharge    PT D/C Equipment  Equipment Needed: Yes  Walker: Rolling  PT Equipment Recommendations  Walker: Rolling    General  Chart Reviewed: Yes  Patient assessed for rehabilitation services?: Yes  Response To Previous Treatment: Patient with no complaints from previous session.  Family/Caregiver Present: No  Follows Commands: Within Functional Limits    Past Medical History:  has a past medical history of Acute encephalopathy, Acute kidney injury (HCC), Homelessness, Hyperglycemia, Hyperlipidemia, Ketonemia, Lactic acidosis, Noncompliance, Schizoaffective disorder (HCC), Tobacco abuse, Type 2 diabetes mellitus with hyperosmolar nonketotic hyperglycemia (HCC), Ventral hernia, Wound of abdomen, and Wound of left ankle.  Past Surgical History:   has a past surgical history that includes Arm Surgery.    Restrictions  Restrictions/Precautions  Restrictions/Precautions: General Precautions, Weight Bearing  Activity Level: Up with Assist, Up as Tolerated  Required Braces or Orthoses?: No  Lower Extremity Weight Bearing Restrictions  Partial Weight Bearing Percentage Or Pounds: WB through heel  Left Lower Extremity Weight Bearing: Partial Weight Bearing  Partial Weight Bearing Percentage Or Pounds: PWB through L heel; per Dr. Wicho Atkinson, MARIJA on 25: \"He doesn’t need surgical shoe as of now. If he is more comfortable in it, he can use it. No restrictions on right side\"  Position Activity Restriction  Other Position/Activity Restrictions: PWB through L heel; per Dr. Wicho Atkinson DPM on 25: \"He doesn’t need surgical shoe as of now. If he is more comfortable in it, he can use it. No 
Podiatry NP Chris notified to find out what type of surgical shoe pt needs.     She stated just a standard flat post op shoe.    House supervisor Shakira notified of need for left foot size 12 for pt in room 2048  
Pt arrived to PCU. VSS and telemetry applied. Belongings charted and placed in closet. Bed in lowest position, side rails up x2, and bed alarm active. Call light within reach. Pt oriented to room. Will continue to monitor.  
Pt sister Divya Jacobson came by and states she is POA. [phone]. Paperwork not in media. To update case management for further review.   
Pt to have surgery with Dr. Weems on Monday 1/20/25 at 1045a.  at 945a.   
Spiritual Health History and Assessment/Progress Note  [facility]    (P) Spiritual/Emotional Needs,  ,  ,      Name: Price Hernandez MRN: [id number]    Age: 59 y.o.     Sex: male   Language: English   Mosque: Other   Sepsis (HCC)     Date: 1/14/2025            Total Time Calculated: (P) 30 min              Spiritual Assessment began in [facility]        Referral/Consult From: (P) Nurse   Encounter Overview/Reason: (P) Spiritual/Emotional Needs  Service Provided For: (P) Patient and family together (Son Katelyn)    Received PS 9:51 a.m that family member was located and that patient and family would like to proceed with AD. After checking with nursing, family and patient, it was concluded that patient lack capacity to make decisions for his self. Patient's son is willing to be patient's decision maker, he also asked if we could complete finance portion. Writer informed son that we do not have power for POA for fiance for health only is unable to complete AD. Called was placed to Ramya .     Jacquelyn, Belief, Meaning:   Patient unable to assess at this time  Family/Friends Other: N?A      Importance and Influence:  Patient unable to assess at this time  Family/Friends have spiritual/personal beliefs that influence decisions regarding the patient's health    Community:  Patient feels well-supported. Support system includes: Children  Family/Friends feel well-supported. Support system includes: Extended family    Assessment and Plan of Care:     Patient Interventions include:   Family/Friends Interventions include: Other: N/A    Patient Plan of Care: Spiritual Care available upon further referral  Family/Friends Plan of Care: Spiritual Care available upon further referral    Electronically signed by Chaplain Mary on 1/14/2025 at 12:44 PM   
Spiritual Health History and Assessment/Progress Note  [facility]    (P) Spiritual/Emotional Needs,  ,  ,      This writer, Chaplain Orestes Cortes, is using Chaplain Bassett's Epic as this writer does not have an access to the Epic yet.       Name: Price Hernandez MRN: [id number]    Age: 59 y.o.     Sex: male   Language: English   Nondenominational: Other   Sepsis (HCC)     Date: 1/15/2025            Total Time Calculated: 30 min              Spiritual Assessment continued in [facility] MED SURG        Referral/Consult From: (P) Palliative Care   Encounter Overview/Reason: (P) Spiritual/Emotional Needs  Service Provided For: (P) Patient    Pt was calm and wants to be released soon.    Jacquelyn, Belief, Meaning:   Patient identifies as spiritual  Family/Friends No family/friends present      Importance and Influence:  Patient has spiritual/personal beliefs that influence decisions regarding their health  Family/Friends No family/friends present    Community:  Patient feels well-supported. Support system includes: Children  Family/Friends No family/friends present    Assessment and Plan of Care:     Patient Interventions include: Facilitated expression of thoughts and feelings, Explored spiritual coping/struggle/distress, and Affirmed coping skills/support systems  Family/Friends Interventions include: No family/friends present    Patient Plan of Care: Spiritual Care available upon further referral  Family/Friends Plan of Care: No family/friends present    Electronically signed by Chaplain Esa on 1/15/2025 at 11:42 AM    
Telemetry order . Dr. Agosto informed and order not renewed at this time.  
Uncontrolled htn  Add lisinopril 20 mg daiily  First dose now  Ari GOKUL Agosto MD  1/12/2025    
Vascular Surgery    Patient is refusing surgery. I recommend removal of his infected left foot toes, likely in form of left TMA. Until he changes his mind or is deemed incompetent to make his own decisions, continue non-operative management. Podiatry will take over Monday if no decision is made before then.    Electronically signed by SHANTEL RUSSELL MD on 1/11/2025 at 5:51 PM  
Vascular Surgery    Patient still refusing surgery. Podiatry will work on getting consent and plan for OR 1/14/25. Will sign off. If there are issues healing TMA, then reconsult prn.    Electronically signed by SHANTEL RUSSELL MD on 1/13/2025 at 7:56 AM  
Writer informed Dulce Maria Quintanilla DPM that patient does not currently have a dressing on his wound and patient is refusing the placement of a dressing.  
Writer notified Dr. Agosto that pt is refusing telemetry at this time. Pt educated, continues to refuse. Per Dr. Agosto, discontinue order.  
Writer notified Dr. Khalil of new consult for competency evaluation and patients desire to leave AMA.  
Writer spoke with Dr. Gandara regarding potential procedure and patient diet orders. As the legal guardian paperwork is not on file, and we are not able to get into contact with the legal guardian at this time, and psych evaluation is not yet complete to deem whether patient is competent to make decisions at this time, pt 4 carb diet may be resumed at this time. Dr. Gandara will still be available if case becomes emergent, however tentative plan now is for Dr. Weems to perform CRIS on Tuesday if patient remains stable.  
Monica, Decreased step length  Distance: 50ft  Comments: questionabale compliance of heel weight bearing with ambulation despite cueing     Stairs  Stairs/Curb  Stairs?: No    Bed Mobility  Bed mobility  Supine to Sit: Modified independent  Sit to Supine: Modified independent  Scooting: Modified independent     Exercises completed:    Exercise 1: bed mobility x2  Exercise 2: STS x1 from bed   Exercise 3: pt stating \"maybe later on\" when asked to perform seated exercises     Assessment  Assessment  Discharge Recommendations: Therapy recommended at discharge  Activity Tolerance  Activity Tolerance: Patient tolerated treatment well       Functional Outcome Measures  AM-PAC Basic Mobility - Inpatient   How much help is needed turning from your back to your side while in a flat bed without using bedrails?: None  How much help is needed moving from lying on your back to sitting on the side of a flat bed without using bedrails?: None  How much help is needed moving to and from a bed to a chair?: A Little  How much help is needed standing up from a chair using your arms?: A Little  How much help is needed walking in hospital room?: A Little  How much help is needed climbing 3-5 steps with a railing?: Total  AM-PAC Inpatient Mobility Raw Score : 18  AM-PAC Inpatient T-Scale Score : 43.63  Mobility Inpatient CMS 0-100% Score: 46.58  Mobility Inpatient CMS G-Code Modifier : CK     Goals  Patient Goals   Patient Goals : \"to get some food\"  Short Term Goals  Time Frame for Short Term Goals: 7 visits  Short Term Goal 1: pt to perform bed mobility indep  Short Term Goal 2: pt to transfer c WW mod indep with proper WB technique  Short Term Goal 3: pt to ambulate 25ft with WW with proper technique  Short Term Goal 4: pt to be indep with LE HEP      Plan  Physical Therapy Plan  General Plan: 3-5 times per week  Current Treatment Recommendations: Strengthening, ROM, Balance training, Functional mobility training, Transfer training, 
and/or Nutrient Delivery: Start Oral Diet, Start Oral Nutrition Supplement  Nutrition Education/Counseling: No recommendation at this time  Coordination of Nutrition Care: Continue to monitor while inpatient       Goals:  Goals: Meet at least 75% of estimated needs  Type of Goal: Continue current goal  Previous Goal Met: Progressing toward Goal(s)    Nutrition Monitoring and Evaluation:   Behavioral-Environmental Outcomes: Knowledge or Skill  Food/Nutrient Intake Outcomes: Food and Nutrient Intake, Supplement Intake  Physical Signs/Symptoms Outcomes: Biochemical Data, GI Status, Fluid Status or Edema, Skin, Weight    Discharge Planning:    Too soon to determine     Fariba Beebe RD, LD  Contact: [phone]    
pulses are not palpable. DP and PT are audible on Doppler. CFT unable to be assessed.  Hair growth is absent to the level of the digits.  2+ pitting edema diffusely across the dorsum of the foot extending to the level of the ankle.       Neuro: Sural/saphenous/SPN/DPN/plantar nerve distribution diminished.     Musculoskeletal: EHL/FHL/TA/GS complex motor intact.  Tenderness to palpation at digits 1 through 4 and diffusely across the forefoot. Compartments soft and easily compressible.  No pain with compression of posterior calf.     Dermatologic: Skin of the left foot appears diffusely xerotic, taut and shiny consistent with PAD.  Digits 2 and 3 appear to be necrotic.  Stable eschar noted to the tip of the left hallux.  No drainage appreciated coming from this wound.  Full  thickness ulcer #1 noted to the dorsal aspect of the second digit and measures ~ 1.5cm x 1.0cm x 0.3cm. Base is necrotic.  Periwound skin is necrotic.  Moderate amount of purulent drainage noted with associated mal odor. Local periwound erythema. Does does not probe to bone. Does track approximately 2cm proximally. No fluctuance, crepitus, or induration appreciated.         Clinical Images:  01/10/25            Imaging:   XR CHEST PORTABLE   Final Result   1. Slightly improved more right-sided multifocal pneumonia, as above.   2. Small reactive right effusion possible.   3. Stable cardiomegaly.         Vascular lower extremity arterial segmental pressures w PPG   Final Result      MRI FOOT LEFT WO CONTRAST   Final Result   1. Acute osteomyelitis of the 1st and 2nd distal phalanxes with intraosseous   gas in the 2nd distal phalanx.   2. Bone marrow edema involving the 2nd and 3rd middle phalanx, proximal   phalanx, and distal metatarsals, which may represent reactive osteitis versus   early osteomyelitis.   3. Soft tissue ulceration along the distal 1st and 2nd digits with soft   tissue gas.   4. Focal fluid collection along the dorsum of the 2/3 
without them.  Currently denies hallucinations; states that he has mild hallucinations on occasion, but does not want to be seen by psychiatry and does not want started back on his psychiatric medications.  Patient is a poor historian and unable to provide further details about HPI.     WBC 21.0 on arrival to ED with lactic acid 1.3, procalcitonin 0.25, sed rate 24, and CRP 83.2.  Left foot x-ray shows soft tissue emphysema involving the proximal 2nd and possibly involving the proximal 3rd toe, which is infectious or posttraumatic in etiology. Irregularity of the soft tissues of the distal great toe.  No definite evidence of osteomyelitis.  Suboptimal evaluation of the distal     Past Medical History:     Past Medical History:   Diagnosis Date    Acute encephalopathy     Acute kidney injury (HCC)     Homelessness     Hyperglycemia     Hyperlipidemia     Ketonemia     Lactic acidosis     Noncompliance     Schizoaffective disorder (HCC)     Tobacco abuse     Type 2 diabetes mellitus with hyperosmolar nonketotic hyperglycemia (HCC) 10/02/2016    Ventral hernia     Wound of abdomen     Wound of left ankle         Past Surgical History:     Past Surgical History:   Procedure Laterality Date    ARM SURGERY      FOOT SURGERY Left 1/17/2025    TRANSMETATARSAL AMPUTATION FOOT WITH ACHILLES TENDON LENGTHENING LEFT performed by Michelle Weems DPM at [facility] OR        Medications Prior to Admission:     Prior to Admission medications    Medication Sig Start Date End Date Taking? Authorizing Provider   ibuprofen (ADVIL;MOTRIN) 200 MG tablet Take 2 tablets by mouth every 6 hours as needed for Pain   Yes Hector Nunez MD   acetaminophen (TYLENOL) 500 MG tablet Take 2 tablets by mouth every 6 hours as needed for Pain   Yes Hector Nunez MD   atorvastatin (LIPITOR) 80 MG tablet Take 80 mg by mouth daily  Patient not taking: Reported on 1/9/2025    Hector Nunez MD   aspirin EC 81 MG EC tablet Take 1 tablet 
awareness of need for assistance, Decreased awareness of need for safety  Problem Solving: Assistance required to identify errors made, Assistance required to correct errors made, Assistance required to implement solutions, Assistance required to generate solutions  Insights: Decreased awareness of deficits  Initiation: Requires cues for some  Sequencing: Requires cues for some  Cognition Comment: Hx of schizoaffective disorder. Significant decreased safety awareness; delayed processing and comprehension noted   Sensation  Overall Sensation Status: Impaired (B feet numbness)  Vision  Vision: Within Functional Limits  Hearing  Hearing: Within functional limits       UE Function  LUE AROM (degrees)  LUE AROM : WFL  Left Hand AROM (degrees)  Left Hand AROM: WFL  Tone LUE  LUE Tone: Normotonic  LUE Strength  Gross LUE Strength: WFL  L Hand General: 4+/5  LUE Strength Comment: Grossly 4+/5    RUE AROM (degrees)  RUE AROM : WFL  Right Hand AROM (degrees)  Right Hand AROM: WFL  Tone RUE  RUE Tone: Normotonic  RUE Strength  Gross RUE Strength: WFL  R Hand General: 4+/5  RUE Strength Comment: Grossly 4+/5    Fine Motor Skills/Coordination  Coordination  Movements Are Fluid And Coordinated: Yes              Bed Mobility  Bed mobility  Supine to Sit: Unable to assess  Sit to Supine: Stand by assistance  Scooting: Stand by assistance  Bed Mobility Comments: HOB slightly elevated, no use of rails. SBA required due to decreased safety awareness with NWB status.    Balance  Balance  Sitting Balance: Stand by assistance  Standing Balance: Minimal assistance (Cornelio for dynamic standing, progressing to CGA)  Standing Balance  Time: 2-3 mins  Activity: functional mobility, transfers, ADLs  Comment: Fair standing balance initially. Pt appears to be bearing weight through LLE despite continual verbal and tactile cues with extensive education on technique.Pt able to progress to maintenance of NWB status during functional 
heads   measuring up to 1.4 cm.         CT FOOT LEFT WO CONTRAST   Final Result   1.  Cortical erosions at the distal phalanx of the great toe with minimal   overlying soft tissue and small amount of soft tissue emphysema.  Features do   indicate cellulitis and osteomyelitis.      2.  Soft tissue emphysema in the 2nd toe but also intraosseous gas which   indicates osteomyelitis/osteonecrosis.  The distal phalanx of the 2nd toe is   not well visualized and could have destruction or previous amputation.      3.  No definite erosions in the 3rd toe.  Only a minimal portion of the soft   tissue emphysema from the 2nd toe tracking into the base the 3rd toe.      4.  Diffuse edema the included foot and ankle.      5.  No destruction at the metatarsals, midfoot, or hindfoot.  There is   flattening of the plantar arch.         XR FOOT LEFT (MIN 3 VIEWS)   Final Result   Soft tissue emphysema involving the proximal 2nd and possibly involving the   proximal 3rd toe.  Which is infectious or posttraumatic in etiology.   Irregularity of the soft tissues of the distal great toe.  No definite   evidence of osteomyelitis.  Suboptimal evaluation of the distal phalanx of   the 2nd toe.  Further evaluation with MRI may be helpful.         XR CHEST PORTABLE   Final Result   Extensive airspace disease throughout the right lung, consistent with   multifocal pneumonia given the clinical history.      Cardiomegaly, with prominent vascular markings, exaggerated by the poor   inspiratory effort.  Mild vascular congestion is difficult to exclude             Cultures:   01/10/2025: Swab, foot  Alcaligenes -gram-negative, Leclercia adecarboxylata  01/09/2025: Blood  NGTD    Assessment    59 y.o. male being seen for:     Wet gangrene, left foot  Osteomyelitis of multiple bones, left foot  Full-thickness ulceration with exposed subcutaneous tissue, left foot  Peripheral arterial disease  Sepsis  Diabetes mellitus, type II    Principal Problem:    
Testing:  Recent Results (from the past 24 hour(s))   POC Glucose Fingerstick    Collection Time: 01/14/25  4:24 PM   Result Value Ref Range    POC Glucose 113 (H) 75 - 110 mg/dL   POC Glucose Fingerstick    Collection Time: 01/14/25  8:15 PM   Result Value Ref Range    POC Glucose 174 (H) 75 - 110 mg/dL   POC Glucose Fingerstick    Collection Time: 01/15/25  5:19 AM   Result Value Ref Range    POC Glucose 148 (H) 75 - 110 mg/dL   C-Reactive Protein    Collection Time: 01/15/25  6:52 AM   Result Value Ref Range    CRP 43.7 (H) 0.0 - 5.0 mg/L   Sedimentation Rate    Collection Time: 01/15/25  6:52 AM   Result Value Ref Range    Sed Rate, Automated 34 (H) 0 - 20 mm/Hr   CBC with Auto Differential    Collection Time: 01/15/25  6:52 AM   Result Value Ref Range    WBC 11.3 (H) 3.5 - 11.0 k/uL    RBC 3.49 (L) 4.5 - 5.9 m/uL    Hemoglobin 10.3 (L) 13.5 - 17.5 g/dL    Hematocrit 30.5 (L) 41 - 53 %    MCV 87.6 80 - 100 fL    MCH 29.6 26 - 34 pg    MCHC 33.8 31 - 37 g/dL    RDW 14.3 11.5 - 14.9 %    Platelets 300 150 - 450 k/uL    MPV 7.3 6.0 - 12.0 fL    Neutrophils % 79 (H) 36 - 66 %    Lymphocytes % 10 (L) 24 - 44 %    Monocytes % 8 (H) 1 - 7 %    Eosinophils % 2 0 - 4 %    Basophils % 1 0 - 2 %    Neutrophils Absolute 9.00 1.3 - 9.1 k/uL    Lymphocytes Absolute 1.20 1.0 - 4.8 k/uL    Monocytes Absolute 0.90 0.1 - 1.3 k/uL    Eosinophils Absolute 0.20 0.0 - 0.4 k/uL    Basophils Absolute 0.10 0.0 - 0.2 k/uL   POC Glucose Fingerstick    Collection Time: 01/15/25 11:22 AM   Result Value Ref Range    POC Glucose 105 75 - 110 mg/dL       Imaging/Diagnostics:  MRI FOOT LEFT WO CONTRAST    Result Date: 1/10/2025  1. Acute osteomyelitis of the 1st and 2nd distal phalanxes with intraosseous gas in the 2nd distal phalanx. 2. Bone marrow edema involving the 2nd and 3rd middle phalanx, proximal phalanx, and distal metatarsals, which may represent reactive osteitis versus early osteomyelitis. 3. Soft tissue ulceration along the distal 
normal.         Behavior: Behavior normal.         Past Medical History:     Past Medical History:   Diagnosis Date    Acute encephalopathy     Acute kidney injury (HCC)     Homelessness     Hyperglycemia     Hyperlipidemia     Ketonemia     Lactic acidosis     Noncompliance     Schizoaffective disorder (HCC)     Tobacco abuse     Type 2 diabetes mellitus with hyperosmolar nonketotic hyperglycemia (HCC) 10/02/2016    Ventral hernia     Wound of abdomen     Wound of left ankle        Past Surgical  History:     Past Surgical History:   Procedure Laterality Date    ARM SURGERY      FOOT SURGERY Left 1/17/2025    TRANSMETATARSAL AMPUTATION FOOT WITH ACHILLES TENDON LENGTHENING LEFT performed by Michelle Weems DPM at [facility] OR       Medications:      influenza virus vaccine  1 Dose IntraMUSCular Prior to discharge    levofloxacin  750 mg IntraVENous Q24H    metoprolol tartrate  25 mg Oral BID    risperiDONE  1 mg Oral BID    lisinopril  20 mg Oral BID    nicotine  1 patch TransDERmal Daily    insulin lispro  0-8 Units SubCUTAneous 4x Daily AC & HS    sodium chloride flush  5-40 mL IntraVENous 2 times per day    enoxaparin  40 mg SubCUTAneous Daily       Social History:     Social History     Socioeconomic History    Marital status:      Spouse name: Not on file    Number of children: Not on file    Years of education: Not on file    Highest education level: Not on file   Occupational History    Not on file   Tobacco Use    Smoking status: Every Day     Current packs/day: 0.25     Types: Cigarettes    Smokeless tobacco: Never   Vaping Use    Vaping status: Never Used   Substance and Sexual Activity    Alcohol use: No    Drug use: No    Sexual activity: Not on file   Other Topics Concern    Not on file   Social History Narrative    Not on file     Social Determinants of Health     Financial Resource Strain: Not on file   Food Insecurity: Food Insecurity Present (1/10/2025)    Hunger Vital Sign     Worried About 
    Michelle Weems DPM on 1/20/2025 at 12:14 PM  Board Certified, American Board of Podiatric Surgery  Fellow, [employer] Foot [employer]le [employer]    
Connections: Not on file   Intimate Partner Violence: Not on file   Housing Stability: High Risk (1/10/2025)    Housing Stability Vital Sign     Unable to Pay for Housing in the Last Year: No     Number of Times Moved in the Last Year: 0     Homeless in the Last Year: Yes       Family History:     Family History   Problem Relation Age of Onset    Cancer Mother     Cancer Father       Medical Decision Making:   I have independently reviewed/ordered the following labs:    CBC with Differential:   Recent Labs     01/14/25  0558 01/15/25  0652   WBC 13.6* 11.3*   HGB 10.5* 10.3*   HCT 31.3* 30.5*    300   LYMPHOPCT 10* 10*   MONOPCT 9* 8*   EOSPCT 1 2     BMP:  No results for input(s): \"NA\", \"K\", \"CL\", \"CO2\", \"BUN\", \"CREATININE\", \"MG\" in the last 72 hours.    Invalid input(s): \"CA\"    Hepatic Function Panel:   No results for input(s): \"LABALBU\", \"BILIDIR\", \"IBILI\", \"BILITOT\", \"ALKPHOS\", \"ALT\", \"AST\" in the last 72 hours.    Invalid input(s): \"PROT\"    No results for input(s): \"RPR\" in the last 72 hours.  No results for input(s): \"HIV\" in the last 72 hours.  No results for input(s): \"BC\" in the last 72 hours.  Lab Results   Component Value Date/Time    CREATININE 0.7 01/12/2025 06:01 AM    GLUCOSE 125 01/12/2025 06:01 AM    GLUCOSE 595 03/11/2020 10:15 PM       Detailed results:        Thank you for allowing us to participate in the care of this patient.Please call with questions.    This note is created with the assistance of a speech recognition program.  While intending to generate adocument that actually reflects the content of the visit, the document can still have some errors including those of syntax and sound a like substitutions which may escape proof reading.  It such instances, actual meaningcan be extrapolated by contextual diversion.    Zahida Haile MD  Office: [phone]  Perfect serve / office [phone]          
David is a 59 y.o. male being evaluated face to face.     --Cora Allen on 1/14/2025 at 1:13 PM    An electronic signature was used to authenticate this note.     **This report has been created using voice recognition software. It may contain minor errors which are inherent in voice recognition technology.**  I independently saw and evaluated the patient.  I reviewed the  documentation above.  Any additional comments or changes to the    documentation are stated below otherwise agree with assessment.      -The patient was seen face-to-face.  The patient is cognitively impaired with problems with orientation.  He is oriented to self and situation only.  The patient denies mental health problems but unspecified questioning, he acknowledges that he has been on risperidone in the past and has found it beneficial.  He has been linked with [employer].  He says risperidone helped him with his thinking.  Risperidone mg twice daily has been ordered    PLAN  Medications as noted above  Attempt to develop insight  Psycho-education conducted.  Supportive Therapy conducted.  Follow-up daily while on inpatient unit    Electronically signed by EDITH ROMO MD on 1/14/25 at 4:43 PM EST    
Transportation (Medical): No     Lack of Transportation (Non-Medical): No   Physical Activity: Not on file   Stress: Not on file   Social Connections: Not on file   Intimate Partner Violence: Not on file   Housing Stability: High Risk (1/10/2025)    Housing Stability Vital Sign     Unable to Pay for Housing in the Last Year: No     Number of Times Moved in the Last Year: 0     Homeless in the Last Year: Yes       Family History:     Family History   Problem Relation Age of Onset    Cancer Mother     Cancer Father       Medical Decision Making:   I have independently reviewed/ordered the following labs:    CBC with Differential:   Recent Labs     01/19/25  0645 01/20/25  0620   WBC 13.2* 12.3*   HGB 9.8* 9.4*   HCT 28.8* 28.2*    281   LYMPHOPCT 11* 10*   MONOPCT 9* 8*   EOSPCT 1 2     BMP:  No results for input(s): \"NA\", \"K\", \"CL\", \"CO2\", \"BUN\", \"CREATININE\", \"MG\" in the last 72 hours.    Invalid input(s): \"CA\"      Hepatic Function Panel:   No results for input(s): \"LABALBU\", \"BILIDIR\", \"IBILI\", \"BILITOT\", \"ALKPHOS\", \"ALT\", \"AST\" in the last 72 hours.    Invalid input(s): \"PROT\"    No results for input(s): \"RPR\" in the last 72 hours.  No results for input(s): \"HIV\" in the last 72 hours.  No results for input(s): \"BC\" in the last 72 hours.  Lab Results   Component Value Date/Time    CREATININE 0.7 01/17/2025 05:53 AM    GLUCOSE 144 01/17/2025 05:53 AM    GLUCOSE 595 03/11/2020 10:15 PM       Detailed results:        Thank you for allowing us to participate in the care of this patient.Please call with questions.    This note is created with the assistance of a speech recognition program.  While intending to generate adocument that actually reflects the content of the visit, the document can still have some errors including those of syntax and sound a like substitutions which may escape proof reading.  It such instances, actual meaningcan be extrapolated by contextual diversion.    Zahida Haile MD  Office: [phone] 
[phone]  Perfect serve / office [phone]          
[id number].   Phone [phone]            
questions.    This note is created with the assistance of a speech recognition program.  While intending to generate adocument that actually reflects the content of the visit, the document can still have some errors including those of syntax and sound a like substitutions which may escape proof reading.  It such instances, actual meaningcan be extrapolated by contextual diversion.    Zahida Haile MD  Office: [phone]  Perfect serve / office [phone]          
1/10/2025 Yes    Diabetic foot infection (HCC) 1/10/2025 Yes       Plan:     Patient status inpatient in the Progressive Unit/Step down    59-year-old -American gentleman who is homeless for last few years smoker 1 pack a day complains of foot pain foul smell from last few months  Significant mental health disorder schizoaffective history of hyperlipidemia chronic ventral abdominal wall hernia peripheral artery disease  Severe peripheral vascular disease in the left foot with toe gangrene  Ankle-brachial pressure index noted poor circulation and dorsalis pedis  ROMARIO  .34   Popliteal seems ok,formal report pending  Post tibial seems ok  Vascular consult for angiogram  Pt may need amputation ,plan from podiatry noted  Iv vanco,id consult  Anemia of chronic dis  Anemia workup  Uncontrolled DM 2 ,insulin  ,diabetic diet    Addendum  PT inr NPO midnight type screen  Case discussed with vascular surg dr Smith,bed side  Plan for or too to remove infection part  Post tibial circulation good enough  Delayed angio plan   Dr smith spoke to podiatry attending ,all on same page  Appreciate vascular input    Jan 11  Pt npo  Plan for vascular surg today        Consultations:   PHARMACY TO DOSE VANCOMYCIN  IP CONSULT TO PODIATRY  IP CONSULT TO DIETITIAN  IP CONSULT TO INFECTIOUS DISEASES  IP CONSULT TO VASCULAR SURGERY  IP CONSULT TO PSYCHIATRY     Patient is admitted as inpatient status because of co-morbidities listed above, severity of signs and symptoms as outlined, requirement for current medical therapies and most importantly because of direct risk to patient if care not provided in a hospital setting.  Expected length of stay > 48 hours.    Ari Agosto MD  1/11/2025  10:25 AM    Copy sent to Dr. Cardona, Pcp    Please note that this chart was generated using voice recognition Dragon dictation software.  Although every effort was made to ensure the accuracy of this automated transcription, some errors in transcription 
AMA        1/15    Patient seen and examined, poor historian but otherwise alert and oriented, was drowsy on my encounter but easily arousable could tell me his name and that he is at [facility]  Deemed incompetent to make decision by psychiatry, awaiting guardianship  On clinda and meropenem as per ID  Patient was getting dyspneic today during conversation will get chest x-ray proBNP, also getting tachycardic and hypertensive add Lopressor  Leukocytosis has been getting better  Blood sugar stable      1/15  Patient seen and examined  His blood sugar has been stable  proBNP was the patient transition to room air  X-ray showed slightly improved right-sided multifocal pneumonia and small reactive right pleural effusion with cardiomegaly next  Currently no sign of volume overload present, patient not on any IV fluids, will check echocardiogram  Vital stable  Labs ordered pending for this more  Patient going for TMA tomorrow  Antibiotics as per ID    Consultations:   IP CONSULT TO PODIATRY  IP CONSULT TO DIETITIAN  IP CONSULT TO INFECTIOUS DISEASES  IP CONSULT TO VASCULAR SURGERY  IP CONSULT TO PSYCHIATRY  IP CONSULT TO ETHICS  IP CONSULT TO PALLIATIVE CARE     Patient is admitted as inpatient status because of co-morbidities listed above, severity of signs and symptoms as outlined, requirement for current medical therapies and most importantly because of direct risk to patient if care not provided in a hospital setting.  Expected length of stay > 48 hours.    Noris Castano MD  1/16/2025  9:06 AM    Copy sent to Dr. Cardona, Pcp    Please note that this chart was generated using voice recognition Dragon dictation software.  Although every effort was made to ensure the accuracy of this automated transcription, some errors in transcription may have occurred.  
Soft/non-tender      []  Normal appearance      [] Distended      [] Ascites      [x] Other: Large anterior wall abdominal hernia    Neurological: [x] Normal Speech      [] Normal Sensation      []  Deficits present:      Extremity:  [x] normal skin color/temp      [] clubbing/cyanosis      []  No edema      [] Other:     Palliative Performance Scale:  _x__60%  Ambulation reduced; Significant disease; Can't do hobbies/housework; intake normal or reduced; occasional assist; LOC full/confusion  ___50%  Mainly sit/lie; Extensive disease; Can't do any work; Considerable assist; intake normal or reduced; LOC full/confusion  ___40%  Mainly in bed; Extensive disease; Mainly assist; intake normal or reduced; LOC full/confusion   ___30%  Bed Bound; Extensive disease; Total care; intake reduced; LOCfull/confusion  ___20%  Bed Bound; Extensive disease; Total care; intake minimal; Drowsy/coma  ___10%  Bed Bound; Extensive disease; Total care; Mouth care only; Drowsy/coma  ___0       Death        Please call with any palliative questions or concerns.  Palliative Care Team is available via perfect serve or via phone.       Palliative Care will continue to follow Mr. Hernandez's care as needed.    The note has been dictated by dragon, typing errors may be a possibility     Thank you for allowing Palliative Care to participate in the care of Mr. Hernandez .    The total time I spent in seeing the patient, discussing goals of care, advanced directives, code status and other major issues was more than 36 minutes     Electronically signed by   CATALINO SUN CNP  Palliative Care Team  on 1/17/2025 at 8:47 AM    Palliative care office: [phone]

## 2025-01-20 NOTE — CARE COORDINATION
I attempted to examine the patient but he was in the OR. Will evaluate tomorrow.     Cora Allen, M3  Medical Student  Psychiatry  formerly [facility]

## 2025-01-20 NOTE — ANESTHESIA PRE PROCEDURE
ROS comment: Schizoaffective disorder GI/Hepatic/Renal: Neg GI/Hepatic/Renal ROS            Endo/Other:    (+) DiabetesType II DM.                 Abdominal:             Vascular:          Other Findings:         Anesthesia Plan      general and TIVA     ASA 3       Induction: intravenous.    MIPS: Postoperative opioids intended and Prophylactic antiemetics administered.  Anesthetic plan and risks discussed with patient.      Plan discussed with CRNA.                Kevin Chin MD   1/20/2025

## 2025-01-20 NOTE — PLAN OF CARE
Plan of Care Note  Foot and Ankle Surgery     Plan of Care      Patient was getting in the shower and did not want to be disturbed during rounds today. Podiatry resident on-call will evaluate and perform dressing changes while rounding tomorrow morning. Will place an order for nursing to perform dressing change to the left foot consisting of Betadine, Adaptic, 4 x 4 gauze, Kerlix, ACE after the patient gets out of the shower. Supplies are in the room. Please [employer] on-call resident with any questions.      Dulce Maria Perez DPM  Foot and Ankle Surgery   01/12/25 at 10:07 AM     
  Problem: ABCDS Injury Assessment  Goal: Absence of physical injury  Outcome: Progressing     
  Problem: Chronic Conditions and Co-morbidities  Goal: Patient's chronic conditions and co-morbidity symptoms are monitored and maintained or improved  1/10/2025 1518 by Pretty Acuña RN  Outcome: Progressing     Problem: Discharge Planning  Goal: Discharge to home or other facility with appropriate resources  1/10/2025 1518 by Pretty Acuña RN  Outcome: Progressing     Problem: Pain  Goal: Verbalizes/displays adequate comfort level or baseline comfort level  1/10/2025 1518 by Pretty Acuña RN  Outcome: Progressing     Problem: ABCDS Injury Assessment  Goal: Absence of physical injury  1/10/2025 1518 by Pretty Acuña RN  Outcome: Progressing     Problem: Safety - Adult  Goal: Free from fall injury  1/10/2025 1518 by Pretty Acuña RN  Outcome: Progressing     
  Problem: Chronic Conditions and Co-morbidities  Goal: Patient's chronic conditions and co-morbidity symptoms are monitored and maintained or improved  1/11/2025 0311 by Samantha Hatfield RN  Outcome: Progressing     Problem: Discharge Planning  Goal: Discharge to home or other facility with appropriate resources  1/11/2025 0311 by Samantha Hatfield RN  Outcome: Progressing     Problem: Pain  Goal: Verbalizes/displays adequate comfort level or baseline comfort level  1/11/2025 0311 by Samantha Hatfield RN  Outcome: Progressing     Problem: ABCDS Injury Assessment  Goal: Absence of physical injury  1/11/2025 0311 by Samantha Hatfield RN  Outcome: Progressing     Problem: Safety - Adult  Goal: Free from fall injury  1/11/2025 0311 by Samantha Hatfield RN  Outcome: Progressing     Problem: Nutrition Deficit:  Goal: Optimize nutritional status  Outcome: Progressing     
  Problem: Chronic Conditions and Co-morbidities  Goal: Patient's chronic conditions and co-morbidity symptoms are monitored and maintained or improved  1/11/2025 1357 by Pretty Acuña RN  Outcome: Progressing     Problem: Discharge Planning  Goal: Discharge to home or other facility with appropriate resources  1/11/2025 1357 by Pretty Acuña RN  Outcome: Progressing     Problem: Pain  Goal: Verbalizes/displays adequate comfort level or baseline comfort level  1/11/2025 1357 by Pretty Acuña RN  Outcome: Progressing     Problem: ABCDS Injury Assessment  Goal: Absence of physical injury  1/11/2025 1357 by Pretty Acuña RN  Outcome: Progressing     Problem: Safety - Adult  Goal: Free from fall injury  1/11/2025 1357 by Pretty Acuña RN  Outcome: Progressing     Problem: Nutrition Deficit:  Goal: Optimize nutritional status  1/11/2025 1357 by Pretty Acuña RN  Outcome: Progressing     
  Problem: Chronic Conditions and Co-morbidities  Goal: Patient's chronic conditions and co-morbidity symptoms are monitored and maintained or improved  1/14/2025 0303 by Karli Roper, RN  Outcome: Progressing     Problem: Discharge Planning  Goal: Discharge to home or other facility with appropriate resources  1/14/2025 0303 by Karli Roper, RN  Outcome: Progressing     Problem: Safety - Adult  Goal: Free from fall injury  Outcome: Progressing  Flowsheets (Taken 1/13/2025 1643 by Crystal Holcomb, RN)  Free From Fall Injury: Instruct family/caregiver on patient safety     
  Problem: Chronic Conditions and Co-morbidities  Goal: Patient's chronic conditions and co-morbidity symptoms are monitored and maintained or improved  1/15/2025 1357 by Christen Lindsay RN  Outcome: Progressing  1/15/2025 0405 by Karli Roper RN  Outcome: Progressing     Problem: Discharge Planning  Goal: Discharge to home or other facility with appropriate resources  1/15/2025 1357 by Christen Lindsay RN  Outcome: Progressing  1/15/2025 0405 by Karli Roper RN  Outcome: Progressing     Problem: Pain  Goal: Verbalizes/displays adequate comfort level or baseline comfort level  Outcome: Progressing     Problem: ABCDS Injury Assessment  Goal: Absence of physical injury  Outcome: Progressing     Problem: Safety - Adult  Goal: Free from fall injury  1/15/2025 1357 by Christen Lindsay RN  Outcome: Progressing  1/15/2025 0405 by Karli Roper RN  Outcome: Progressing     Problem: Nutrition Deficit:  Goal: Optimize nutritional status  Outcome: Progressing     
  Problem: Chronic Conditions and Co-morbidities  Goal: Patient's chronic conditions and co-morbidity symptoms are monitored and maintained or improved  1/16/2025 1408 by Christen Lindsay RN  Outcome: Progressing  1/16/2025 0436 by Willie Hubbard RN  Outcome: Progressing     Problem: Discharge Planning  Goal: Discharge to home or other facility with appropriate resources  Outcome: Progressing     Problem: Pain  Goal: Verbalizes/displays adequate comfort level or baseline comfort level  1/16/2025 1408 by Christen Lindsay RN  Outcome: Progressing  1/16/2025 0436 by Willie Hubbard RN  Outcome: Progressing     Problem: ABCDS Injury Assessment  Goal: Absence of physical injury  Outcome: Progressing     Problem: Safety - Adult  Goal: Free from fall injury  Outcome: Progressing     Problem: Nutrition Deficit:  Goal: Optimize nutritional status  Outcome: Progressing     
  Problem: Chronic Conditions and Co-morbidities  Goal: Patient's chronic conditions and co-morbidity symptoms are monitored and maintained or improved  1/17/2025 0323 by Shelley Streeter RN  Outcome: Progressing  Flowsheets (Taken 1/17/2025 0323)  Care Plan - Patient's Chronic Conditions and Co-Morbidity Symptoms are Monitored and Maintained or Improved:   Monitor and assess patient's chronic conditions and comorbid symptoms for stability, deterioration, or improvement   Collaborate with multidisciplinary team to address chronic and comorbid conditions and prevent exacerbation or deterioration   Update acute care plan with appropriate goals if chronic or comorbid symptoms are exacerbated and prevent overall improvement and discharge  1/16/2025 1408 by Christen Lindsay RN  Outcome: Progressing     Problem: Discharge Planning  Goal: Discharge to home or other facility with appropriate resources  1/17/2025 0323 by Shelley Streeter RN  Outcome: Progressing  Flowsheets (Taken 1/17/2025 0323)  Discharge to home or other facility with appropriate resources:   Identify barriers to discharge with patient and caregiver   Arrange for needed discharge resources and transportation as appropriate   Identify discharge learning needs (meds, wound care, etc)   Arrange for interpreters to assist at discharge as needed   Refer to discharge planning if patient needs post-hospital services based on physician order or complex needs related to functional status, cognitive ability or social support system  1/16/2025 1408 by Christen Lindsay RN  Outcome: Progressing     Problem: Pain  Goal: Verbalizes/displays adequate comfort level or baseline comfort level  1/17/2025 0323 by Shelley Streeter RN  Outcome: Progressing  Flowsheets (Taken 1/17/2025 0323)  Verbalizes/displays adequate comfort level or baseline comfort level: Encourage patient to monitor pain and request assistance  1/16/2025 1408 by Angélica 
  Problem: Chronic Conditions and Co-morbidities  Goal: Patient's chronic conditions and co-morbidity symptoms are monitored and maintained or improved  1/17/2025 1355 by Crystal Holcomb RN  Outcome: Progressing  Flowsheets (Taken 1/17/2025 0900)  Care Plan - Patient's Chronic Conditions and Co-Morbidity Symptoms are Monitored and Maintained or Improved: Monitor and assess patient's chronic conditions and comorbid symptoms for stability, deterioration, or improvement   Monitoring.   Problem: Discharge Planning  Goal: Discharge to home or other facility with appropriate resources  1/17/2025 1355 by Crystal Holcomb RN  Outcome: Progressing  Flowsheets (Taken 1/17/2025 0900)  Discharge to home or other facility with appropriate resources: Identify barriers to discharge with patient and caregiver   Pt to discharge to SNF once medically cleared.   Problem: Pain  Goal: Verbalizes/displays adequate comfort level or baseline comfort level  1/17/2025 1355 by Crystal Holcomb RN  Outcome: Progressing  Flowsheets (Taken 1/17/2025 0900)  Verbalizes/displays adequate comfort level or baseline comfort level: Encourage patient to monitor pain and request assistance   Monitoring. Pt currently down for surgery still.   Problem: ABCDS Injury Assessment  Goal: Absence of physical injury  1/17/2025 1355 by Crystal Holcomb RN  Outcome: Progressing  Flowsheets (Taken 1/17/2025 0900)  Absence of Physical Injury: Implement safety measures based on patient assessment   No injury noted this shift.   Problem: Safety - Adult  Goal: Free from fall injury  1/17/2025 1355 by Crystal Holcomb RN  Outcome: Progressing  Flowsheets (Taken 1/17/2025 0900)  Free From Fall Injury: Instruct family/caregiver on patient safety   NO falls noted this shift.   Problem: Nutrition Deficit:  Goal: Optimize nutritional status  1/17/2025 1355 by Crystal Holcomb RN  Outcome: Progressing   Pt tolerating po intake this shift.   
  Problem: Chronic Conditions and Co-morbidities  Goal: Patient's chronic conditions and co-morbidity symptoms are monitored and maintained or improved  1/19/2025 0836 by Bernice Mukherjee, RN  Outcome: Progressing  Flowsheets (Taken 1/19/2025 0822)  Care Plan - Patient's Chronic Conditions and Co-Morbidity Symptoms are Monitored and Maintained or Improved:   Collaborate with multidisciplinary team to address chronic and comorbid conditions and prevent exacerbation or deterioration   Monitor and assess patient's chronic conditions and comorbid symptoms for stability, deterioration, or improvement   Update acute care plan with appropriate goals if chronic or comorbid symptoms are exacerbated and prevent overall improvement and discharge  1/19/2025 0422 by Shelley Streeter RN  Outcome: Progressing  Flowsheets (Taken 1/19/2025 0422)  Care Plan - Patient's Chronic Conditions and Co-Morbidity Symptoms are Monitored and Maintained or Improved:   Monitor and assess patient's chronic conditions and comorbid symptoms for stability, deterioration, or improvement   Collaborate with multidisciplinary team to address chronic and comorbid conditions and prevent exacerbation or deterioration   Update acute care plan with appropriate goals if chronic or comorbid symptoms are exacerbated and prevent overall improvement and discharge     Problem: Discharge Planning  Goal: Discharge to home or other facility with appropriate resources  1/19/2025 0836 by Bernice Mukherjee, RN  Outcome: Progressing  Flowsheets (Taken 1/19/2025 0822)  Discharge to home or other facility with appropriate resources:   Identify barriers to discharge with patient and caregiver   Arrange for needed discharge resources and transportation as appropriate   Identify discharge learning needs (meds, wound care, etc)   Arrange for interpreters to assist at discharge as needed   Refer to discharge planning if patient needs post-hospital services based on 
  Problem: Chronic Conditions and Co-morbidities  Goal: Patient's chronic conditions and co-morbidity symptoms are monitored and maintained or improved  1/20/2025 1802 by Millicent Hull RN  Outcome: Adequate for Discharge  Flowsheets (Taken 1/20/2025 0721)  Care Plan - Patient's Chronic Conditions and Co-Morbidity Symptoms are Monitored and Maintained or Improved:   Monitor and assess patient's chronic conditions and comorbid symptoms for stability, deterioration, or improvement   Collaborate with multidisciplinary team to address chronic and comorbid conditions and prevent exacerbation or deterioration   Update acute care plan with appropriate goals if chronic or comorbid symptoms are exacerbated and prevent overall improvement and discharge  1/20/2025 0441 by Willie Hubbard RN  Outcome: Progressing     Problem: Discharge Planning  Goal: Discharge to home or other facility with appropriate resources  Outcome: Adequate for Discharge  Flowsheets (Taken 1/20/2025 0721)  Discharge to home or other facility with appropriate resources:   Identify barriers to discharge with patient and caregiver   Arrange for needed discharge resources and transportation as appropriate   Identify discharge learning needs (meds, wound care, etc)     Problem: Pain  Goal: Verbalizes/displays adequate comfort level or baseline comfort level  1/20/2025 1802 by Millicent Hull, RN  Outcome: Adequate for Discharge  1/20/2025 0441 by Willie Hubbard RN  Outcome: Progressing     Problem: ABCDS Injury Assessment  Goal: Absence of physical injury  Outcome: Adequate for Discharge     Problem: Safety - Adult  Goal: Free from fall injury  Outcome: Adequate for Discharge     Problem: Nutrition Deficit:  Goal: Optimize nutritional status  Outcome: Adequate for Discharge  Flowsheets (Taken 1/20/2025 1313 by Fariba Beebe, RD, LD)  Nutrient intake appropriate for improving, restoring, or maintaining nutritional needs: Monitor oral intake, labs, and 
  Problem: Chronic Conditions and Co-morbidities  Goal: Patient's chronic conditions and co-morbidity symptoms are monitored and maintained or improved  Outcome: Progressing     Problem: Discharge Planning  Goal: Discharge to home or other facility with appropriate resources  Outcome: Progressing     Problem: Pain  Goal: Verbalizes/displays adequate comfort level or baseline comfort level  Outcome: Progressing     Problem: ABCDS Injury Assessment  Goal: Absence of physical injury  Outcome: Progressing     Problem: Safety - Adult  Goal: Free from fall injury  Outcome: Progressing     
  Problem: Chronic Conditions and Co-morbidities  Goal: Patient's chronic conditions and co-morbidity symptoms are monitored and maintained or improved  Outcome: Progressing     Problem: Discharge Planning  Goal: Discharge to home or other facility with appropriate resources  Outcome: Progressing     Problem: Pain  Goal: Verbalizes/displays adequate comfort level or baseline comfort level  Outcome: Progressing     Problem: ABCDS Injury Assessment  Goal: Absence of physical injury  Outcome: Progressing     Problem: Safety - Adult  Goal: Free from fall injury  Outcome: Progressing     Problem: Nutrition Deficit:  Goal: Optimize nutritional status  Outcome: Progressing     
  Problem: Chronic Conditions and Co-morbidities  Goal: Patient's chronic conditions and co-morbidity symptoms are monitored and maintained or improved  Outcome: Progressing     Problem: Discharge Planning  Goal: Discharge to home or other facility with appropriate resources  Outcome: Progressing     Problem: Safety - Adult  Goal: Free from fall injury  Outcome: Progressing     
  Problem: Chronic Conditions and Co-morbidities  Goal: Patient's chronic conditions and co-morbidity symptoms are monitored and maintained or improved  Outcome: Progressing     Problem: Pain  Goal: Verbalizes/displays adequate comfort level or baseline comfort level  Outcome: Progressing     
  Problem: Chronic Conditions and Co-morbidities  Goal: Patient's chronic conditions and co-morbidity symptoms are monitored and maintained or improved  Outcome: Progressing     Problem: Pain  Goal: Verbalizes/displays adequate comfort level or baseline comfort level  Outcome: Progressing     
  Problem: Discharge Planning  Goal: Discharge to home or other facility with appropriate resources  Outcome: Progressing     Problem: Chronic Conditions and Co-morbidities  Goal: Patient's chronic conditions and co-morbidity symptoms are monitored and maintained or improved  Outcome: Progressing     Problem: Pain  Goal: Verbalizes/displays adequate comfort level or baseline comfort level  Outcome: Progressing     
Independent Practitioner if interventions unsuccessful or patient reports new pain     Problem: ABCDS Injury Assessment  Goal: Absence of physical injury  Outcome: Progressing  Flowsheets (Taken 1/19/2025 0422)  Absence of Physical Injury: Implement safety measures based on patient assessment     Problem: Safety - Adult  Goal: Free from fall injury  Outcome: Progressing  Flowsheets (Taken 1/19/2025 0422)  Free From Fall Injury: Instruct family/caregiver on patient safety     Problem: Nutrition Deficit:  Goal: Optimize nutritional status  Outcome: Progressing  Flowsheets (Taken 1/19/2025 0422)  Nutrient intake appropriate for improving, restoring, or maintaining nutritional needs: Monitor oral intake, labs, and treatment plans

## 2025-01-21 NOTE — DISCHARGE SUMMARY
[place] [id number]  Schedule an appointment as soon as possible for a visit  post-hospital follow-up, establish care    Naa Hart MD  [address] [id number] [phone]    Schedule an appointment as soon as possible for a visit in 1 month(s)  post-hospital follow-up, establish care  ischemia workup as outpatient       Activity: Resume as directed    Discharge Medications:      Medication List        START taking these medications      aspirin EC 81 MG EC tablet  Take 1 tablet by mouth daily     empagliflozin 10 MG tablet  Commonly known as: JARDIANCE  Take 1 tablet by mouth daily     levoFLOXacin 750 MG tablet  Commonly known as: Levaquin  Take 1 tablet by mouth daily for 5 days     metoprolol succinate 25 MG extended release tablet  Commonly known as: TOPROL XL  Take 1 tablet by mouth daily     risperiDONE 1 MG tablet  Commonly known as: RISPERDAL  Take 1 tablet by mouth 2 times daily     sacubitril-valsartan 24-26 MG per tablet  Commonly known as: ENTRESTO  Take 1 tablet by mouth 2 times daily  Start taking on: January 22, 2025            CHANGE how you take these medications      atorvastatin 40 MG tablet  Commonly known as: LIPITOR  Take 1 tablet by mouth daily  What changed:   medication strength  how much to take            CONTINUE taking these medications      acetaminophen 500 MG tablet  Commonly known as: TYLENOL     ibuprofen 200 MG tablet  Commonly known as: ADVIL;MOTRIN     insulin lispro (1 Unit Dial) 100 UNIT/ML Sopn  Commonly known as: HumaLOG KwikPen  Inject 10 Units into the skin 3 times daily (before meals)     [employer] Pen Needles 31G 31G X 8 MM Misc  Generic drug: Insulin Pen Needle  1 each by Does not apply route daily     Lantus SoloStar 100 UNIT/ML injection pen  Generic drug: insulin glargine  Inject 25 Units into the skin 2 times daily            STOP taking these medications      OLANZapine 5 MG tablet  Commonly known as: ZYPREXA     rosuvastatin 40

## 2025-01-22 LAB
MICROORGANISM SPEC CULT: ABNORMAL
MICROORGANISM SPEC CULT: ABNORMAL
MICROORGANISM/AGENT SPEC: ABNORMAL
MICROORGANISM/AGENT SPEC: ABNORMAL
SERVICE CMNT-IMP: ABNORMAL
SPECIMEN DESCRIPTION: ABNORMAL
SURGICAL PATHOLOGY REPORT: NORMAL

## 2025-01-28 NOTE — PROGRESS NOTES
Patient instructed to remove shoes and socks and instructed to sit in exam chair.  Current PCP is No, Pcp and date of last visit was unknown.   Do you have a follow up visit scheduled?  No  If yes, the date is unknown

## 2025-02-03 ENCOUNTER — OFFICE VISIT (OUTPATIENT)
Dept: PODIATRY | Age: 60
End: 2025-02-03
Payer: MEDICARE

## 2025-02-03 VITALS
BODY MASS INDEX: 27.22 KG/M2 | HEART RATE: 104 BPM | HEIGHT: 72 IN | DIASTOLIC BLOOD PRESSURE: 72 MMHG | SYSTOLIC BLOOD PRESSURE: 104 MMHG | WEIGHT: 201 LBS

## 2025-02-03 DIAGNOSIS — T81.30XA WOUND DEHISCENCE: ICD-10-CM

## 2025-02-03 DIAGNOSIS — Z89.432 STATUS POST TRANSMETATARSAL AMPUTATION OF FOOT, LEFT (HCC): Primary | ICD-10-CM

## 2025-02-03 PROCEDURE — 4004F PT TOBACCO SCREEN RCVD TLK: CPT | Performed by: STUDENT IN AN ORGANIZED HEALTH CARE EDUCATION/TRAINING PROGRAM

## 2025-02-03 PROCEDURE — G8419 CALC BMI OUT NRM PARAM NOF/U: HCPCS | Performed by: STUDENT IN AN ORGANIZED HEALTH CARE EDUCATION/TRAINING PROGRAM

## 2025-02-03 PROCEDURE — G8427 DOCREV CUR MEDS BY ELIG CLIN: HCPCS | Performed by: STUDENT IN AN ORGANIZED HEALTH CARE EDUCATION/TRAINING PROGRAM

## 2025-02-03 PROCEDURE — 99214 OFFICE O/P EST MOD 30 MIN: CPT | Performed by: STUDENT IN AN ORGANIZED HEALTH CARE EDUCATION/TRAINING PROGRAM

## 2025-02-03 PROCEDURE — 3017F COLORECTAL CA SCREEN DOC REV: CPT | Performed by: STUDENT IN AN ORGANIZED HEALTH CARE EDUCATION/TRAINING PROGRAM

## 2025-02-03 PROCEDURE — 1111F DSCHRG MED/CURRENT MED MERGE: CPT | Performed by: STUDENT IN AN ORGANIZED HEALTH CARE EDUCATION/TRAINING PROGRAM

## 2025-02-03 NOTE — PROGRESS NOTES
[facility]  [address]   [address] [place] [address]  Tel: [phone]   Fax: [phone]    Subjective     CC: wound follow up from [facility], TMA 1/20/25    HPI:  Price Hernandez is a 59 y.o. year old male who presents to clinic today for follow up from [facility].  Patient had a TMA performed and was discharged to [facility] for acute rehab. Patient's original dressings have remained in place since her surgery. He denies consitutional symptoms. No purulence. Patient has remained non weight bearing in a wheel chair. No other complaints at this time.    Primary care physician is No, Pcp    ROS:    Constitutional: Denies nausea, vomiting, fever, chills.  Neurologic: Denies numbness, tingling, and burning in the feet.    Vascular: Denies symptoms of lower extremity claudication.    Skin: Positive pen wounds, dehisced dorsal incision to left TMA site.  Otherwise negative except as noted in the HPI.     PMH:  Past Medical History:   Diagnosis Date    Acute encephalopathy     Acute kidney injury (HCC)     Homelessness     Hyperglycemia     Hyperlipidemia     Ketonemia     Lactic acidosis     Noncompliance     Schizoaffective disorder (HCC)     Tobacco abuse     Type 2 diabetes mellitus with hyperosmolar nonketotic hyperglycemia (HCC) 10/02/2016    Ventral hernia     Wound of abdomen     Wound of left ankle        Surgical History:   Past Surgical History:   Procedure Laterality Date    ARM SURGERY      FOOT SURGERY Left 1/17/2025    TRANSMETATARSAL AMPUTATION FOOT WITH ACHILLES TENDON LENGTHENING LEFT performed by Michelle Weems DPM at [facility]    WOUND EXPLORATION Left 1/20/2025    DELAYED PRIMARY CLOSURE OF FOOT performed by Michelle Weems DPM at [facility]       Social History:  Social History     Tobacco Use    Smoking status: Every Day     Current packs/day: 0.25     Types: Cigarettes    Smokeless tobacco: Never   Vaping Use    Vaping status: Never Used

## 2025-03-01 ENCOUNTER — HOSPITAL ENCOUNTER (EMERGENCY)
Age: 60
Discharge: HOME | End: 2025-03-01
Attending: EMERGENCY MEDICINE
Payer: MEDICARE

## 2025-03-01 VITALS
DIASTOLIC BLOOD PRESSURE: 83 MMHG | RESPIRATION RATE: 16 BRPM | HEIGHT: 74 IN | OXYGEN SATURATION: 98 % | TEMPERATURE: 97.8 F | DIASTOLIC BLOOD PRESSURE: 83 MMHG | SYSTOLIC BLOOD PRESSURE: 141 MMHG | HEART RATE: 100 BPM | WEIGHT: 195 LBS | BODY MASS INDEX: 25.03 KG/M2 | WEIGHT: 195 LBS | OXYGEN SATURATION: 98 % | BODY MASS INDEX: 25.03 KG/M2 | HEIGHT: 74 IN | SYSTOLIC BLOOD PRESSURE: 141 MMHG | TEMPERATURE: 97.8 F | RESPIRATION RATE: 16 BRPM | HEART RATE: 100 BPM

## 2025-03-01 VITALS
DIASTOLIC BLOOD PRESSURE: 86 MMHG | BODY MASS INDEX: 25.03 KG/M2 | WEIGHT: 195 LBS | HEART RATE: 109 BPM | WEIGHT: 195 LBS | TEMPERATURE: 97.2 F | HEART RATE: 109 BPM | SYSTOLIC BLOOD PRESSURE: 123 MMHG | SYSTOLIC BLOOD PRESSURE: 123 MMHG | BODY MASS INDEX: 25.03 KG/M2 | RESPIRATION RATE: 18 BRPM | TEMPERATURE: 97.2 F | HEIGHT: 74 IN | RESPIRATION RATE: 18 BRPM | DIASTOLIC BLOOD PRESSURE: 86 MMHG | OXYGEN SATURATION: 98 % | HEIGHT: 74 IN | OXYGEN SATURATION: 98 %

## 2025-03-01 DIAGNOSIS — M79.672 LEFT FOOT PAIN: Primary | ICD-10-CM

## 2025-03-01 DIAGNOSIS — Z48.89 ENCOUNTER FOR POST SURGICAL WOUND CHECK: Primary | ICD-10-CM

## 2025-03-01 PROCEDURE — 6370000000 HC RX 637 (ALT 250 FOR IP)

## 2025-03-01 PROCEDURE — 99282 EMERGENCY DEPT VISIT SF MDM: CPT

## 2025-03-01 RX ORDER — ACETAMINOPHEN 500 MG
1000 TABLET ORAL ONCE
Status: COMPLETED | OUTPATIENT
Start: 2025-03-01 | End: 2025-03-01

## 2025-03-01 RX ADMIN — ACETAMINOPHEN 1000 MG: 500 TABLET ORAL at 22:52

## 2025-03-01 ASSESSMENT — PAIN SCALES - GENERAL
PAINLEVEL_OUTOF10: 5
PAINLEVEL_OUTOF10: 4
PAINLEVEL_OUTOF10: 4

## 2025-03-01 ASSESSMENT — PAIN DESCRIPTION - ORIENTATION: ORIENTATION: LEFT

## 2025-03-01 ASSESSMENT — PAIN DESCRIPTION - DESCRIPTORS: DESCRIPTORS: ACHING

## 2025-03-01 ASSESSMENT — PAIN - FUNCTIONAL ASSESSMENT
PAIN_FUNCTIONAL_ASSESSMENT: NONE - DENIES PAIN
PAIN_FUNCTIONAL_ASSESSMENT: 0-10
PAIN_FUNCTIONAL_ASSESSMENT: 0-10

## 2025-03-01 ASSESSMENT — PAIN DESCRIPTION - LOCATION: LOCATION: FOOT

## 2025-03-01 ASSESSMENT — LIFESTYLE VARIABLES
HOW OFTEN DO YOU HAVE A DRINK CONTAINING ALCOHOL: NEVER
HOW MANY STANDARD DRINKS CONTAINING ALCOHOL DO YOU HAVE ON A TYPICAL DAY: PATIENT DOES NOT DRINK
HOW OFTEN DO YOU HAVE A DRINK CONTAINING ALCOHOL: NEVER
HOW MANY STANDARD DRINKS CONTAINING ALCOHOL DO YOU HAVE ON A TYPICAL DAY: PATIENT DOES NOT DRINK

## 2025-03-01 NOTE — ED PROVIDER NOTES
[facility] EMERGENCY DEPARTMENT  EMERGENCY DEPARTMENT ENCOUNTER      Pt Name: Price Hernandez  MRN: [id number]  Birthdate 1965  Date of evaluation: 3/1/25    CHIEF COMPLAINT       Chief Complaint   Patient presents with    Foot Pain     HISTORY OF PRESENT ILLNESS   HPI 59 y.o. male presents with c/o foot wound.  The patient is coming into the emergency department for evaluation of his surgical foot wound.  The patient was admitted to the hospital between the ninth and 21 January where he was treated for sepsis gangrene of the toes he had a TMA on 18 January and closure of the TMA on the 20th.  He was treated with IV antibiotics.  He had followed up in podiatry clinic on 5 February he was noted to have some mild dehiscence of the wound without secondary signs of infection.  He has not followed up in clinic since that time, he says that he had some bleeding coming from the area where his wound is opened up on the lateral aspect of his foot over the last 3 days.  He has been walking on it even though they told him not to.  No fevers no purulent drainage from the foot he denies any pain in the area.    REVIEW OF SYSTEMS       Review of Systems   Constitutional:  Negative for fever.   Musculoskeletal:  Negative for arthralgias.   Skin:  Positive for wound. Negative for rash.       PAST MEDICAL HISTORY     Past Medical History:   Diagnosis Date    Diabetes mellitus (HCC)        SURGICAL HISTORY       Past Surgical History:   Procedure Laterality Date    AMPUTATION         CURRENT MEDICATIONS       There are no discharge medications for this patient.      ALLERGIES     has No Known Allergies.    FAMILY HISTORY     has no family status information on file.        SOCIAL HISTORY      reports that he has been smoking cigarettes. He has never used smokeless tobacco.    PHYSICAL EXAM     INITIAL VITALS: /86   Pulse (!) 109   Temp 97.2 °F (36.2 °C) (Oral)   Resp 18   Ht 1.88 m (6' 2\")   Wt 88.5 kg (195 lb)    SpO2 98%   BMI 25.04 kg/m²   Gen: nad  Head: Normocephalic, atraumatic  Eye: Pupils equal round reactive to light, no conjunctivitis  ENT: MMM  Heart: Regular rate and rhythm no murmurs  Lungs: Clear to auscultation bilaterally, no respiratory distress  Neurologic: Patient is alert and oriented x3, Extremities: TMA on the left foot there is some dehiscence of the wound still, I do not see any purulent drainage, there is a lot of dried flaking skin, there is no erythema or warmth to touch.  There is no necrosis.  I cannot express any purulent drainage out of the dehisced area.  Images were placed in the patient's chart.    MEDICAL DECISION MAKIN-year-old presenting for a wound evaluation.  Comorbid conditions peripheral vascular disease recent TMA, homelessness, diabetes, schizophrenia.  On physical examination I do not see signs of infection.  I consulted with podiatry, they reviewed the images of the wounds.  They recommended cleaning the area applying a dressing and the patient's stay for follow-up in clinic.  Discussed the treatment plan with the patient and he is in agreement.    Procedures      DATA FOR LAB AND RADIOLOGY TESTS ORDERED BELOW ARE REVIEWED BY THE ED CLINICIAN:  Vitals Reviewed:    Vitals:    25 1119   BP: 123/86   Pulse: (!) 109   Resp: 18   Temp: 97.2 °F (36.2 °C)   TempSrc: Oral   SpO2: 98%   Weight: 88.5 kg (195 lb)   Height: 1.88 m (6' 2\")     MEDICATIONS GIVEN TO PATIENT THIS ENCOUNTER:  No orders of the defined types were placed in this encounter.    DISCHARGE PRESCRIPTIONS:  There are no discharge medications for this patient.    PHYSICIAN CONSULTS ORDERED THIS ENCOUNTER:  IP CONSULT TO PODIATRY     FINAL IMPRESSION      1. Encounter for post surgical wound check          DISPOSITION/PLAN   DISPOSITION Decision To Discharge 2025 02:12:35 PM   DISPOSITION CONDITION Stable           PATIENT REFERRED TO:  Marcelle Napoles, NARESH  [address]  [place]

## 2025-03-01 NOTE — ED TRIAGE NOTES
Mode of arrival (squad #, walk in, police, etc) : walk in         Chief complaint(s): foot injury        Arrival Note (brief scenario, treatment PTA, etc).: hit LT foot 7 weeks ago was hospitalized, left AMA, now foot bleeding xs 3 days        C= \"Have you ever felt that you should Cut down on your drinking?\"  No  A= \"Have people Annoyed you by criticizing your drinking?\"  No  G= \"Have you ever felt bad or Guilty about your drinking?\"  No  E= \"Have you ever had a drink as an Eye-opener first thing in the morning to steady your nerves or to help a hangover?\"  No      Deferred []      Reason for deferring: N/A    *If yes to two or more: probable alcohol abuse.*

## 2025-03-02 NOTE — ED PROVIDER NOTES
MEDICATIONS:  New Prescriptions    No medications on file       Rafiq Headley MD  Emergency Medicine Resident    (Please note that portions of thisnote were completed with a voice recognition program.  Efforts were made to edit the dictations but occasionally words are mis-transcribed.)

## 2025-07-07 ENCOUNTER — APPOINTMENT (OUTPATIENT)
Dept: GENERAL RADIOLOGY | Age: 60
End: 2025-07-07
Payer: MEDICARE

## 2025-07-07 ENCOUNTER — HOSPITAL ENCOUNTER (OUTPATIENT)
Age: 60
Setting detail: OBSERVATION
Discharge: OTHER INSTITUTION | End: 2025-07-08
Admitting: INTERNAL MEDICINE
Payer: MEDICARE

## 2025-07-07 DIAGNOSIS — M86.9 OSTEOMYELITIS, UNSPECIFIED SITE, UNSPECIFIED TYPE (HCC): Primary | ICD-10-CM

## 2025-07-07 DIAGNOSIS — E13.42 DIABETIC POLYNEUROPATHY ASSOCIATED WITH OTHER SPECIFIED DIABETES MELLITUS (HCC): ICD-10-CM

## 2025-07-07 DIAGNOSIS — R23.9 SKIN CHANGE: ICD-10-CM

## 2025-07-07 PROBLEM — L97.509 DIABETIC FOOT ULCER WITH OSTEOMYELITIS (HCC): Status: ACTIVE | Noted: 2025-07-07

## 2025-07-07 PROBLEM — E11.69 DIABETIC FOOT ULCER WITH OSTEOMYELITIS (HCC): Status: ACTIVE | Noted: 2025-07-07

## 2025-07-07 PROBLEM — E11.621 DIABETIC FOOT ULCER WITH OSTEOMYELITIS (HCC): Status: ACTIVE | Noted: 2025-07-07

## 2025-07-07 LAB
ANION GAP SERPL CALCULATED.3IONS-SCNC: 9 MMOL/L (ref 9–16)
BASOPHILS # BLD: 0.04 K/UL (ref 0–0.2)
BASOPHILS NFR BLD: 1 % (ref 0–2)
BUN SERPL-MCNC: 12 MG/DL (ref 6–20)
CALCIUM SERPL-MCNC: 8.3 MG/DL (ref 8.6–10.4)
CHLORIDE SERPL-SCNC: 108 MMOL/L (ref 98–107)
CO2 SERPL-SCNC: 25 MMOL/L (ref 20–31)
CREAT SERPL-MCNC: 0.9 MG/DL (ref 0.7–1.2)
CRP SERPL HS-MCNC: <3 MG/L (ref 0–5)
EOSINOPHIL # BLD: 0.22 K/UL (ref 0–0.44)
EOSINOPHILS RELATIVE PERCENT: 4 % (ref 1–4)
ERYTHROCYTE [DISTWIDTH] IN BLOOD BY AUTOMATED COUNT: 14.4 % (ref 11.8–14.4)
ERYTHROCYTE [SEDIMENTATION RATE] IN BLOOD BY PHOTOMETRIC METHOD: 2 MM/HR (ref 0–20)
GFR, ESTIMATED: >90 ML/MIN/1.73M2
GLUCOSE SERPL-MCNC: 137 MG/DL (ref 74–99)
HCT VFR BLD AUTO: 36.1 % (ref 40.7–50.3)
HGB BLD-MCNC: 12.3 G/DL (ref 13–17)
IMM GRANULOCYTES # BLD AUTO: 0.01 K/UL (ref 0–0.3)
IMM GRANULOCYTES NFR BLD: 0 %
LYMPHOCYTES NFR BLD: 1.51 K/UL (ref 1.1–3.7)
LYMPHOCYTES RELATIVE PERCENT: 24 % (ref 24–43)
MCH RBC QN AUTO: 29.1 PG (ref 25.2–33.5)
MCHC RBC AUTO-ENTMCNC: 34.1 G/DL (ref 28.4–34.8)
MCV RBC AUTO: 85.3 FL (ref 82.6–102.9)
MONOCYTES NFR BLD: 0.57 K/UL (ref 0.1–1.2)
MONOCYTES NFR BLD: 9 % (ref 3–12)
NEUTROPHILS NFR BLD: 62 % (ref 36–65)
NEUTS SEG NFR BLD: 3.94 K/UL (ref 1.5–8.1)
NRBC BLD-RTO: 0 PER 100 WBC
PLATELET # BLD AUTO: 164 K/UL (ref 138–453)
PMV BLD AUTO: 10.9 FL (ref 8.1–13.5)
POTASSIUM SERPL-SCNC: 3.8 MMOL/L (ref 3.7–5.3)
RBC # BLD AUTO: 4.23 M/UL (ref 4.21–5.77)
SODIUM SERPL-SCNC: 142 MMOL/L (ref 136–145)
WBC OTHER # BLD: 6.3 K/UL (ref 3.5–11.3)

## 2025-07-07 PROCEDURE — 85652 RBC SED RATE AUTOMATED: CPT

## 2025-07-07 PROCEDURE — 80048 BASIC METABOLIC PNL TOTAL CA: CPT

## 2025-07-07 PROCEDURE — 73630 X-RAY EXAM OF FOOT: CPT

## 2025-07-07 PROCEDURE — 99222 1ST HOSP IP/OBS MODERATE 55: CPT

## 2025-07-07 PROCEDURE — 99285 EMERGENCY DEPT VISIT HI MDM: CPT

## 2025-07-07 PROCEDURE — 1200000000 HC SEMI PRIVATE

## 2025-07-07 PROCEDURE — 85025 COMPLETE CBC W/AUTO DIFF WBC: CPT

## 2025-07-07 PROCEDURE — 86140 C-REACTIVE PROTEIN: CPT

## 2025-07-07 ASSESSMENT — PAIN SCALES - GENERAL: PAINLEVEL_OUTOF10: 0

## 2025-07-07 ASSESSMENT — ENCOUNTER SYMPTOMS
SHORTNESS OF BREATH: 0
RHINORRHEA: 0
COLOR CHANGE: 0
NAUSEA: 0
VOMITING: 0
CHEST TIGHTNESS: 0

## 2025-07-07 ASSESSMENT — PAIN - FUNCTIONAL ASSESSMENT: PAIN_FUNCTIONAL_ASSESSMENT: 0-10

## 2025-07-07 NOTE — ED PROVIDER NOTES
EMERGENCY DEPARTMENT ENCOUNTER    Pt Name: Price Hernandez  MRN: [id number]  Birthdate 1965  Date of evaluation: 7/7/25  CHIEF COMPLAINT       Chief Complaint   Patient presents with    Wound Check     L foot. Had surgery a couple months ago. Wants dressing changed. Hx of gangrene      HISTORY OF PRESENT ILLNESS   HPI   Patient 59-year-old male medical history as noted below who presents today secondary to wound check.  Would like left foot to be evaluated has had a surgery couple of months ago and has not had any follow-up.  Has been changing the dressing every couple of days.  Not having any fever or chills.  Not having any discharge or pain to the foot.  However also notes is concerned that may want to have his right foot to be evaluated as well.  This foot is not having any discharge or discomfort either.  Notes he does have history of diabetes however is not compliant with any of his medication.  Still ambulates without any difficulty.  Denies any trauma to either area.      REVIEW OF SYSTEMS     Review of Systems   Constitutional:  Negative for chills and fever.   HENT:  Negative for congestion and rhinorrhea.    Respiratory:  Negative for chest tightness and shortness of breath.    Cardiovascular:  Negative for chest pain, palpitations and leg swelling.   Gastrointestinal:  Negative for nausea and vomiting.   Skin:  Negative for color change and wound.   Neurological:  Negative for weakness and light-headedness.     PASTMEDICAL HISTORY     Past Medical History:   Diagnosis Date    Acute encephalopathy     Acute kidney injury     Diabetes mellitus (HCC)     Homelessness     Hyperglycemia     Hyperlipidemia     Ketonemia     Lactic acidosis     Noncompliance     Schizoaffective disorder (HCC)     Tobacco abuse     Type 2 diabetes mellitus with hyperosmolar nonketotic hyperglycemia (HCC) 10/02/2016    Ventral hernia     Wound of abdomen     Wound of left ankle      Past Problem List  Patient Active

## 2025-07-08 ENCOUNTER — APPOINTMENT (OUTPATIENT)
Dept: GENERAL RADIOLOGY | Age: 60
End: 2025-07-08
Payer: MEDICARE

## 2025-07-08 VITALS
RESPIRATION RATE: 18 BRPM | DIASTOLIC BLOOD PRESSURE: 97 MMHG | OXYGEN SATURATION: 100 % | SYSTOLIC BLOOD PRESSURE: 149 MMHG | HEART RATE: 76 BPM | BODY MASS INDEX: 26.32 KG/M2 | WEIGHT: 205 LBS | TEMPERATURE: 98.2 F

## 2025-07-08 LAB
ANION GAP SERPL CALCULATED.3IONS-SCNC: 9 MMOL/L (ref 9–16)
BASOPHILS # BLD: 0.04 K/UL (ref 0–0.2)
BASOPHILS NFR BLD: 1 % (ref 0–2)
BUN SERPL-MCNC: 13 MG/DL (ref 6–20)
CALCIUM SERPL-MCNC: 8.6 MG/DL (ref 8.6–10.4)
CHLORIDE SERPL-SCNC: 108 MMOL/L (ref 98–107)
CO2 SERPL-SCNC: 25 MMOL/L (ref 20–31)
CREAT SERPL-MCNC: 1 MG/DL (ref 0.7–1.2)
EOSINOPHIL # BLD: 0.18 K/UL (ref 0–0.44)
EOSINOPHILS RELATIVE PERCENT: 3 % (ref 1–4)
ERYTHROCYTE [DISTWIDTH] IN BLOOD BY AUTOMATED COUNT: 14.2 % (ref 11.8–14.4)
EST. AVERAGE GLUCOSE BLD GHB EST-MCNC: 154 MG/DL
GFR, ESTIMATED: >90 ML/MIN/1.73M2
GLUCOSE BLD-MCNC: 128 MG/DL (ref 75–110)
GLUCOSE BLD-MCNC: 81 MG/DL (ref 75–110)
GLUCOSE BLD-MCNC: 96 MG/DL (ref 75–110)
GLUCOSE SERPL-MCNC: 152 MG/DL (ref 74–99)
HBA1C MFR BLD: 7 % (ref 4–6)
HCT VFR BLD AUTO: 38.4 % (ref 40.7–50.3)
HGB BLD-MCNC: 12.9 G/DL (ref 13–17)
IMM GRANULOCYTES # BLD AUTO: 0.01 K/UL (ref 0–0.3)
IMM GRANULOCYTES NFR BLD: 0 %
LYMPHOCYTES NFR BLD: 1.6 K/UL (ref 1.1–3.7)
LYMPHOCYTES RELATIVE PERCENT: 25 % (ref 24–43)
MCH RBC QN AUTO: 28.8 PG (ref 25.2–33.5)
MCHC RBC AUTO-ENTMCNC: 33.6 G/DL (ref 28.4–34.8)
MCV RBC AUTO: 85.7 FL (ref 82.6–102.9)
MONOCYTES NFR BLD: 0.53 K/UL (ref 0.1–1.2)
MONOCYTES NFR BLD: 8 % (ref 3–12)
MRSA, DNA, NASAL: NEGATIVE
NEUTROPHILS NFR BLD: 63 % (ref 36–65)
NEUTS SEG NFR BLD: 4.09 K/UL (ref 1.5–8.1)
NRBC BLD-RTO: 0 PER 100 WBC
PLATELET # BLD AUTO: 141 K/UL (ref 138–453)
PMV BLD AUTO: 11.9 FL (ref 8.1–13.5)
POTASSIUM SERPL-SCNC: 4.1 MMOL/L (ref 3.7–5.3)
PROCALCITONIN SERPL-MCNC: <0.02 NG/ML (ref 0–0.09)
RBC # BLD AUTO: 4.48 M/UL (ref 4.21–5.77)
SODIUM SERPL-SCNC: 142 MMOL/L (ref 136–145)
SPECIMEN DESCRIPTION: NORMAL
WBC OTHER # BLD: 6.5 K/UL (ref 3.5–11.3)

## 2025-07-08 PROCEDURE — 97535 SELF CARE MNGMENT TRAINING: CPT

## 2025-07-08 PROCEDURE — 82947 ASSAY GLUCOSE BLOOD QUANT: CPT

## 2025-07-08 PROCEDURE — 97162 PT EVAL MOD COMPLEX 30 MIN: CPT

## 2025-07-08 PROCEDURE — 97166 OT EVAL MOD COMPLEX 45 MIN: CPT

## 2025-07-08 PROCEDURE — 84145 PROCALCITONIN (PCT): CPT

## 2025-07-08 PROCEDURE — 73630 X-RAY EXAM OF FOOT: CPT

## 2025-07-08 PROCEDURE — 6360000002 HC RX W HCPCS

## 2025-07-08 PROCEDURE — 6370000000 HC RX 637 (ALT 250 FOR IP)

## 2025-07-08 PROCEDURE — 99239 HOSP IP/OBS DSCHRG MGMT >30: CPT | Performed by: INTERNAL MEDICINE

## 2025-07-08 PROCEDURE — 97530 THERAPEUTIC ACTIVITIES: CPT

## 2025-07-08 PROCEDURE — 87641 MR-STAPH DNA AMP PROBE: CPT

## 2025-07-08 PROCEDURE — 96372 THER/PROPH/DIAG INJ SC/IM: CPT

## 2025-07-08 PROCEDURE — 36415 COLL VENOUS BLD VENIPUNCTURE: CPT

## 2025-07-08 PROCEDURE — 80048 BASIC METABOLIC PNL TOTAL CA: CPT

## 2025-07-08 PROCEDURE — 85025 COMPLETE CBC W/AUTO DIFF WBC: CPT

## 2025-07-08 PROCEDURE — 99204 OFFICE O/P NEW MOD 45 MIN: CPT | Performed by: SURGERY

## 2025-07-08 PROCEDURE — 83036 HEMOGLOBIN GLYCOSYLATED A1C: CPT

## 2025-07-08 PROCEDURE — 2500000003 HC RX 250 WO HCPCS

## 2025-07-08 RX ORDER — SODIUM CHLORIDE 0.9 % (FLUSH) 0.9 %
5-40 SYRINGE (ML) INJECTION PRN
Status: DISCONTINUED | OUTPATIENT
Start: 2025-07-08 | End: 2025-07-08 | Stop reason: HOSPADM

## 2025-07-08 RX ORDER — POTASSIUM CHLORIDE 7.45 MG/ML
10 INJECTION INTRAVENOUS PRN
Status: DISCONTINUED | OUTPATIENT
Start: 2025-07-08 | End: 2025-07-08 | Stop reason: HOSPADM

## 2025-07-08 RX ORDER — SODIUM CHLORIDE 9 MG/ML
INJECTION, SOLUTION INTRAVENOUS PRN
Status: DISCONTINUED | OUTPATIENT
Start: 2025-07-08 | End: 2025-07-08 | Stop reason: HOSPADM

## 2025-07-08 RX ORDER — NICOTINE 21 MG/24HR
1 PATCH, TRANSDERMAL 24 HOURS TRANSDERMAL DAILY
Status: DISCONTINUED | OUTPATIENT
Start: 2025-07-08 | End: 2025-07-08 | Stop reason: HOSPADM

## 2025-07-08 RX ORDER — RISPERIDONE 1 MG/1
1 TABLET ORAL 2 TIMES DAILY
Status: DISCONTINUED | OUTPATIENT
Start: 2025-07-08 | End: 2025-07-08 | Stop reason: HOSPADM

## 2025-07-08 RX ORDER — ENOXAPARIN SODIUM 100 MG/ML
40 INJECTION SUBCUTANEOUS DAILY
Status: DISCONTINUED | OUTPATIENT
Start: 2025-07-08 | End: 2025-07-08 | Stop reason: HOSPADM

## 2025-07-08 RX ORDER — SODIUM CHLORIDE 9 MG/ML
INJECTION, SOLUTION INTRAVENOUS CONTINUOUS
Status: DISCONTINUED | OUTPATIENT
Start: 2025-07-08 | End: 2025-07-08

## 2025-07-08 RX ORDER — ONDANSETRON 2 MG/ML
4 INJECTION INTRAMUSCULAR; INTRAVENOUS EVERY 6 HOURS PRN
Status: DISCONTINUED | OUTPATIENT
Start: 2025-07-08 | End: 2025-07-08 | Stop reason: HOSPADM

## 2025-07-08 RX ORDER — MAGNESIUM SULFATE IN WATER 40 MG/ML
2000 INJECTION, SOLUTION INTRAVENOUS PRN
Status: DISCONTINUED | OUTPATIENT
Start: 2025-07-08 | End: 2025-07-08 | Stop reason: HOSPADM

## 2025-07-08 RX ORDER — ACETAMINOPHEN 325 MG/1
650 TABLET ORAL EVERY 6 HOURS PRN
Status: DISCONTINUED | OUTPATIENT
Start: 2025-07-08 | End: 2025-07-08 | Stop reason: HOSPADM

## 2025-07-08 RX ORDER — BISACODYL 10 MG
10 SUPPOSITORY, RECTAL RECTAL DAILY PRN
Status: DISCONTINUED | OUTPATIENT
Start: 2025-07-08 | End: 2025-07-08 | Stop reason: HOSPADM

## 2025-07-08 RX ORDER — POTASSIUM CHLORIDE 1500 MG/1
40 TABLET, EXTENDED RELEASE ORAL PRN
Status: DISCONTINUED | OUTPATIENT
Start: 2025-07-08 | End: 2025-07-08 | Stop reason: HOSPADM

## 2025-07-08 RX ORDER — ACETAMINOPHEN 650 MG/1
650 SUPPOSITORY RECTAL EVERY 6 HOURS PRN
Status: DISCONTINUED | OUTPATIENT
Start: 2025-07-08 | End: 2025-07-08 | Stop reason: HOSPADM

## 2025-07-08 RX ORDER — SODIUM CHLORIDE 0.9 % (FLUSH) 0.9 %
5-40 SYRINGE (ML) INJECTION EVERY 12 HOURS SCHEDULED
Status: DISCONTINUED | OUTPATIENT
Start: 2025-07-08 | End: 2025-07-08 | Stop reason: HOSPADM

## 2025-07-08 RX ORDER — POLYETHYLENE GLYCOL 3350 17 G/17G
17 POWDER, FOR SOLUTION ORAL DAILY PRN
Status: DISCONTINUED | OUTPATIENT
Start: 2025-07-08 | End: 2025-07-08 | Stop reason: HOSPADM

## 2025-07-08 RX ORDER — ASPIRIN 81 MG/1
81 TABLET ORAL DAILY
Status: DISCONTINUED | OUTPATIENT
Start: 2025-07-08 | End: 2025-07-08 | Stop reason: HOSPADM

## 2025-07-08 RX ORDER — METOPROLOL SUCCINATE 25 MG/1
25 TABLET, EXTENDED RELEASE ORAL DAILY
Status: DISCONTINUED | OUTPATIENT
Start: 2025-07-08 | End: 2025-07-08 | Stop reason: HOSPADM

## 2025-07-08 RX ORDER — DEXTROSE MONOHYDRATE 100 MG/ML
INJECTION, SOLUTION INTRAVENOUS CONTINUOUS PRN
Status: DISCONTINUED | OUTPATIENT
Start: 2025-07-08 | End: 2025-07-08 | Stop reason: HOSPADM

## 2025-07-08 RX ORDER — INSULIN LISPRO 100 [IU]/ML
0-8 INJECTION, SOLUTION INTRAVENOUS; SUBCUTANEOUS
Status: DISCONTINUED | OUTPATIENT
Start: 2025-07-08 | End: 2025-07-08 | Stop reason: HOSPADM

## 2025-07-08 RX ORDER — ATORVASTATIN CALCIUM 40 MG/1
40 TABLET, FILM COATED ORAL DAILY
Status: DISCONTINUED | OUTPATIENT
Start: 2025-07-08 | End: 2025-07-08 | Stop reason: HOSPADM

## 2025-07-08 RX ORDER — ONDANSETRON 4 MG/1
4 TABLET, ORALLY DISINTEGRATING ORAL EVERY 8 HOURS PRN
Status: DISCONTINUED | OUTPATIENT
Start: 2025-07-08 | End: 2025-07-08 | Stop reason: HOSPADM

## 2025-07-08 RX ADMIN — EMPAGLIFLOZIN 10 MG: 10 TABLET, FILM COATED ORAL at 08:02

## 2025-07-08 RX ADMIN — ENOXAPARIN SODIUM 40 MG: 100 INJECTION SUBCUTANEOUS at 08:03

## 2025-07-08 RX ADMIN — SODIUM CHLORIDE, PRESERVATIVE FREE 10 ML: 5 INJECTION INTRAVENOUS at 08:02

## 2025-07-08 RX ADMIN — SACUBITRIL AND VALSARTAN 1 TABLET: 24; 26 TABLET, FILM COATED ORAL at 08:02

## 2025-07-08 RX ADMIN — ATORVASTATIN CALCIUM 40 MG: 40 TABLET, FILM COATED ORAL at 08:02

## 2025-07-08 RX ADMIN — METOPROLOL SUCCINATE 25 MG: 25 TABLET, EXTENDED RELEASE ORAL at 08:02

## 2025-07-08 RX ADMIN — RISPERIDONE 1 MG: 1 TABLET, FILM COATED ORAL at 08:02

## 2025-07-08 RX ADMIN — ASPIRIN 81 MG: 81 TABLET, DELAYED RELEASE ORAL at 08:02

## 2025-07-08 ASSESSMENT — ENCOUNTER SYMPTOMS
CHOKING: 0
COLOR CHANGE: 1
EYE ITCHING: 0
BACK PAIN: 0
COUGH: 0
EYE PAIN: 0
VOMITING: 0
DIARRHEA: 0
NAUSEA: 0

## 2025-07-08 NOTE — H&P
..  [facility]  Office: [phone]  Logan Leslie DO, Rob Whitaker, DO, Bryan Huffman DO, David Galindo DO, Danielle Morris MD, Taylor Royal MD, Renay Cai MD, Sue Golden MD,  Rafiq Contreras MD, Monty Gandara MD, Khoi Dyer MD,  Kiya Lin DO, Juli Barahona MD, Giovanni Soriano MD, Baljinder Leslie DO, Bonnie Haile MD,  Liborio Ghosh DO, Kaya Canela MD, Katie Rios MD, Lukasz Obrien MD,  Hang Encinas MD, Mariela Bates MD, Sebas Roy MD, Nelda Velez MD, Blu Turner MD, Marvin Macias MD, Sandeep Miranda DO, Cassia Wilson MD, Kelli Roque DO, Jerome Real MD, Kiya Rosen MD, Mohsin Reza, MD, Carlos Enrique Bryson MD, Shirley Waterhouse, CNP,  Jayleen Morales, CNP, Sandeep French, CNP,  Tammy Blackman, JONNA, Gisele Colvin CNP, Reta Taveras, CNP, Janeth Rodney, CNP, Rose Maria, CNP, Shreya Shay, PA-C, Manasa Sanders, CNP, Vero Cueto, CNP,  Kirstie Galindo, CNP, Deborah Linder, CNP, Yovani Hair, PA-C, Lucy Bennett, PA-C, Chuyita Ignacio, CNP,        Willow Deleon, CNS, Dulce Maria Dey, CNP, Meenakshi Leija, CNP         [facility]   IN-PATIENT SERVICE   [facility]    HISTORY AND PHYSICAL EXAMINATION            Date:   7/8/2025  Patient name:  Price Hernandez  Date of admission:  7/7/2025  5:56 PM  MRN:   [id number]  Account:  [id number]  YOB: 1965  PCP:    No, Pcp  Room:   [address] [place] [address]  Code Status:    Full Code    Chief Complaint:     Chief Complaint   Patient presents with    Wound Check     L foot. Had surgery a couple months ago. Wants dressing changed. Hx of gangrene        History Obtained From:     patient, electronic medical record    History of Present Illness:     Price Hernandez is a 59 y.o. Non- / non  male who presents with Wound Check (L foot. Had surgery a couple months ago. Wants dressing changed. Hx of gangrene )   and is admitted to the hospital for the management of 
NEGATIVE

## 2025-07-08 NOTE — CONSULTS
Vascular Surgery Consult Note    Reason for Consult: Dusky toes right foot    HPI :    This is a 59 y.o. male with a history of diabetes.  He is homeless and had a previous left TMA from January 2025.  There was some concern because he is missing a toe on his right foot and the toes also look dusky and worried about his peripheral perfusion.    I reviewed his previous ROMARIO study from January which showed normal perfusion to both lower extremities.  It is somewhat difficult to palpate his pulses but he has normal triphasic Doppler signal in both the DP and the PT in his right foot and I have no concerns about peripheral arterial disease.  I think all the changes seen in his feet are related to uncontrolled diabetes..       Review Of Systems :       Past Medical History:   Diagnosis Date    Acute encephalopathy     Acute kidney injury     Diabetes mellitus (HCC)     Homelessness     Hyperglycemia     Hyperlipidemia     Ketonemia     Lactic acidosis     Noncompliance     Schizoaffective disorder (HCC)     Tobacco abuse     Type 2 diabetes mellitus with hyperosmolar nonketotic hyperglycemia (HCC) 10/02/2016    Ventral hernia     Wound of abdomen     Wound of left ankle         Past Surgical History:   Procedure Laterality Date    AMPUTATION      ARM SURGERY      FOOT SURGERY Left 1/17/2025    TRANSMETATARSAL AMPUTATION FOOT WITH ACHILLES TENDON LENGTHENING LEFT performed [facility]    WOUND EXPLORATION Left 1/20/2025    DELAYED PRIMARY CLOSURE OF FOOT performed by Michelle Weems DPM at [facility] OR       Positive and Negative as described in HPI    Constitutional:  negative for  fevers, chills, sweats, fatigue, and weight loss  HEENT:  negative for vision or hearing changes,   Respiratory:  negative for shortness of breath, cough, or congestion  Cardiovascular:  negative for  chest pain, palpitations  Gastrointestinal:  negative for nausea, vomiting, diarrhea, constipation, abdominal 
DP and PT pulses are palpable.  CFT <3 seconds to all digits.  Hair growth is absent to the level of the digits. No edema.    Neuro: Sural/saphenous/SPN/DPN/plantar nerve distribution SILT  Musculoskeletal: EHL/FHL/TA/GS complex motor intact. Tender to palpation of incision of TMA. Compartments soft and easily compressible  Dermatologic: Xerosis noted to incision.     RLE:  Vascular:  DP and PT pulses are palpable.  CFT <3 seconds to all digits.  Hair growth is absent to the level of the digits. No edema.    Neuro: Sural/saphenous/SPN/DPN/plantar nerve distribution SILT  Musculoskeletal: EHL/FHL/TA/GS complex motor intact. Tender to palpation of incision of TMA. Compartments soft and easily compressible  Dermatologic: 2nd digit appears to be necrotic. Webspaces 1-4 were clean and dry.      Imaging:               Assessment:  59 y.o. male who is being seen for:     - PVD  - Homelessness  - History of tobacco abuse  - History of TMA, left lower extremity  - History of amputation, right hallux  Principal Problem:    Diabetic foot ulcer with osteomyelitis (HCC)  Active Problems:    HFrEF (heart failure with reduced ejection fraction) (HCC)    Cardiomyopathy (HCC)    Ventral hernia without obstruction or gangrene    History of noncompliance with medical treatment    Tobacco abuse    Paranoid schizophrenia (HCC)    Peripheral vascular disease  Resolved Problems:    * No resolved hospital problems. *       Plan:    Due to no constitutional symptoms, inflammatory markers indicated no sepsis, radiographs showed no OM on right lower extremity and no definitive OM on the left lower extremity with no continuous route to outside, workup concludes that acute surgical intervention is not needed during this admission.  No inflammatory markers will be ordered as bilateral lower extremities do not indicate an acute infectious process.  Patient may follow-up with Dr. Gandara outpatient.  Patient is going to follow-up with vascular

## 2025-07-08 NOTE — CARE COORDINATION
Discharge planning     Updated per RN that patient is requesting shelter.     Met with patient and called 211. They stated can call the 2 emergency mens shelters available  [facility] [phone]  [facility] [phone]    Spoke with Keith at [facility] and they do have open beds but are only available to do intake fro 4 pm to 5 pm. Unable to get patient there that quickly    Call to [facility] and spoke with  he has been there before. They can accept him. Intake is open 24 hours and they will intake him on arrival.     Tried to call sister but number is inaccurate. He did not wish for me to call his son.     Patient has bike with his cane out front. Unable to take with him in cab. Call to security. To see if can store his bike.  Security to have officer for sta call back unit. Spoke with security and his bike is locked up in front     [facility]  [address]   [facility] [address]

## 2025-07-08 NOTE — DISCHARGE SUMMARY
5th metatarsal.  In the appropriate clinical setting this could represent developing osteomyelitis.     XR FOOT RIGHT (MIN 3 VIEWS)  Result Date: 7/7/2025  Status post partial amputation of the proximal phalanx great toe. No radiographic evidence of osteomyelitis.       Consultations:    Consults:     Final Specialist Recommendations/Findings:   IP CONSULT TO PODIATRY  IP CONSULT TO INTERNAL MEDICINE  IP CONSULT TO SPIRITUAL SERVICES  IP CONSULT TO RESPIRATORY CARE  IP CONSULT TO VASCULAR SURGERY      The patient was seen and examined on day of discharge and this discharge summary is in conjunction with any daily progress note from day of discharge.    Discharge plan:     Disposition: Home    Physician Follow Up:     Kiya Gandara DPM  [address] [place] [address]  [phone]    Call in 1 week(s)         Requiring Further Evaluation/Follow Up POST HOSPITALIZATION/Incidental Findings: follow up postop    Diet: diabetic diet    Activity: As tolerated    Instructions to Patient: take medications as prescribed      Discharge Medications:      Medication List        START taking these medications      aspirin EC 81 MG EC tablet  Take 1 tablet by mouth daily     atorvastatin 40 MG tablet  Commonly known as: LIPITOR  Take 1 tablet by mouth daily     empagliflozin 10 MG tablet  Commonly known as: JARDIANCE  Take 1 tablet by mouth daily     metoprolol succinate 25 MG extended release tablet  Commonly known as: TOPROL XL  Take 1 tablet by mouth daily     risperiDONE 1 MG tablet  Commonly known as: RISPERDAL  Take 1 tablet by mouth 2 times daily     sacubitril-valsartan 24-26 MG per tablet  Commonly known as: ENTRESTO  Take 1 tablet by mouth 2 times daily            CONTINUE taking these medications      insulin lispro (1 Unit Dial) 100 UNIT/ML Sopn  Commonly known as: HumaLOG KwikPen  Inject 10 Units into the skin 3 times daily (before meals)     [employer] Pen Needles 31G 31G X 8 MM Misc  Generic drug: Insulin Pen

## 2025-07-08 NOTE — PLAN OF CARE
Pt remains safe and free from falls thus far in shift  Podiatry removed staples and dressed L foot  Vascular signed off  ACHS, no coverage needed  Urinal bedside, BRP  SBA w/walker  Discharge planning in progress, will go to shelter once medically stable  Call light in reach  Bed locked and lowest position  Bed alarm on for safety  Care ongoing        Problem: Chronic Conditions and Co-morbidities  Goal: Patient's chronic conditions and co-morbidity symptoms are monitored and maintained or improved  Outcome: Progressing     Problem: Discharge Planning  Goal: Discharge to home or other facility with appropriate resources  Outcome: Progressing     Problem: Pain  Goal: Verbalizes/displays adequate comfort level or baseline comfort level  Outcome: Progressing     Problem: Safety - Adult  Goal: Free from fall injury  Outcome: Progressing     Problem: Skin/Tissue Integrity - Adult  Goal: Skin integrity remains intact  Outcome: Progressing     Problem: Skin/Tissue Integrity - Adult  Goal: Incisions, wounds, or drain sites healing without S/S of infection  Outcome: Progressing     Problem: Musculoskeletal - Adult  Goal: Return mobility to safest level of function  Outcome: Progressing

## 2025-07-08 NOTE — CARE COORDINATION
Case Management Assessment  Initial Evaluation    Date/Time of Evaluation: 7/8/2025 8:13 AM  Assessment Completed by: Marquita Coleman    If patient is discharged prior to next notation, then this note serves as note for discharge by case management.    Patient Name: Price Hernandez                   YOB: 1965  Diagnosis: Diabetic foot ulcer with osteomyelitis (HCC) [E11.621, E11.69, L97.509, M86.9]  Skin change [R23.9]  Diabetic polyneuropathy associated with other specified diabetes mellitus (HCC) [E13.42]  Osteomyelitis, unspecified site, unspecified type (HCC) [M86.9]                   Date / Time: 7/7/2025  5:56 PM    Patient Admission Status: Inpatient   Readmission Risk (Low < 19, Mod (19-27), High > 27): Readmission Risk Score: 10    Current PCP: No, Pcp  PCP verified by CM? (P) No (No PCP, declines PCP list)    Chart Reviewed: Yes      History Provided by: (P) Patient  Patient Orientation: (P) Alert and Oriented    Patient Cognition: (P) Alert    Hospitalization in the last 30 days (Readmission):  No    If yes, Readmission Assessment in CM Navigator will be completed.    Advance Directives:      Code Status: Full Code   Patient's Primary Decision Maker is: (P) Legal Next of Kin    Primary Decision Maker: Katelyn Hernandez - Child - [phone]    Primary Decision Maker: Joyce Hernandez - Child - [phone]    Discharge Planning:    Patient lives with: (P) Family Members (currently living with sister) Type of Home: (P) Homeless (Lives list Divya hernandez)  Primary Care Giver: (P) Self  Patient Support Systems include: (P) Family Members (Divya Hernandez)   Current Financial resources: (P) Medicare  Current community resources:    Current services prior to admission: (P) Durable Medical Equipment            Current DME: (P) Cane            Type of Home Care services:  (P) None    ADLS  Prior functional level: (P) Independent in ADLs/IADLs  Current functional level: (P) Assistance with the

## 2025-07-08 NOTE — DISCHARGE INSTRUCTIONS
Podiatric Discharge Instructions  You are being discharged home    Fluids and Diet:  Drink water regularly and avoid fluids with added sugar  If not nauseated then resume your regular pre-operative diet when you are ready    Medications:  Take your prescriptions as directed  You may resume your regularly scheduled medications (unless otherwise directed)  If any side effects or adverse reactions occur, discontinue the medication and contact your doctor.  Review the patient drug information that is provided before you take any medication    Ambulation and Activity:  You are advised to go directly home from the hospital  You may put weight on the bilateral feet.    Avoid stairs if possible  Keep heels elevated off bed with pillows to avoid pressure ulcerations   Do not lift or move heavy objects  Do not drive until cleared by your physician    Bandage and Wound Care Instructions:  Keep bandage clean and dry   Do not shower or bathe the operative extremity  Do not remove the bandage (unless otherwise directed)   Do not attempt to put anything between the cast or dressing and your skin, some itching is normal.    Ice and Elevation:  Elevate operative extremity as much as possible to reduce swelling and discomfort.  Elevate with 2 pillows at or above the level of the heart for the first 72 hours.  Ice:  Apply insulated ice bag over the bandage 20 minutes of every hour while awake for the first 72 hours.  You may ice behind the knee as well.    Special Instructions: Call your doctor immediately if you develop any of the following.  Fever over 100.4 degrees Fahrenheit by mouth - take your temperature daily until your first follow up visit.  Pain not relieved by medication ordered  Swelling, increased redness, warmth, or hardness around operative area.  Numb, tingling or cold toes.  Toe(s) become white or bluish  Bandage becomes wet, soiled, or blood soaked (small amount of bleeding may be normal)  Increased or progressive

## 2025-07-08 NOTE — PROGRESS NOTES
PT discharged to [facility]  Cab voucher given  All belongings accounted for  IVS and cardiac monitors removed  Discharge paperwork gone over with pt  Meds to beds delivered prescriptions   
Physical Therapy  Facility/Department: [facility] PROGRESSIVE CARE  Physical Therapy Initial Assessment    Name: Price Hernandez  : 1965  MRN: [id number]  Date of Service: 2025    Per H&P: 59 y.o. Non- / non  male who presents with Wound Check (L foot. Had surgery a couple months ago. Wants dressing changed. Hx of gangrene )  and is admitted to the hospital for the management of Diabetic foot ulcer with osteomyelitis (HCC).  Patient is a 59-year-old male who has a history of diabetes as well as noncompliance.  Patient's had 5 toes amputated about 5 months ago but did not follow-up outpatient.  He suffers from chronic leg wounds and came in for wound check on left side however right toes were noted to be significantly discolored.  He denies any further acute symptoms.    MICHEAL Souza reports patient is medically stable for therapy treatment this date.    Chart reviewed prior to treatment and patient is agreeable for therapy.  All lines intact and patient positioned comfortably at end of treatment.  All patient needs addressed prior to ending therapy session.     Discharge Recommendations:  Patient would benefit from continued therapy after discharge.  Due to recent hospitalization and medical condition, pt would benefit from additional intermittent skilled therapy at time of discharge.  Please refer to the AM-PAC score for current functional status.           Patient Diagnosis(es): The primary encounter diagnosis was Osteomyelitis, unspecified site, unspecified type (HCC). Diagnoses of Skin change and Diabetic polyneuropathy associated with other specified diabetes mellitus (HCC) were also pertinent to this visit.  Past Medical History:  has a past medical history of Acute encephalopathy, Acute kidney injury, Diabetes mellitus (HCC), Homelessness, Hyperglycemia, Hyperlipidemia, Ketonemia, Lactic acidosis, Noncompliance, Schizoaffective disorder (HCC), Tobacco abuse, Type 2 diabetes mellitus with 
Pt admitted from ED to PCU room 1023. Telemetry initiated, vitals obtained, signed and held orders released. Care ongoing.   
[x] The Home Med List was verified for accuracy and marked COMPLETED. The following sources were used to assist with Medication Reconciliation:    [] Med Rec Pharmacy tech has already completed home med list in the ED and note reviewed   [] Patient med list was transcribed into the EHR and verified for accuracy.(Note method of verification)  [] Patient provided bottles of their medications for validation  [x] Medications reviewed and confirmed with pt. States he is homeless and does not take any home meds.   [] Contacted patient's pharmacy to confirm home medications (Please list the pharmacy)  [] Home medications reviewed using Dispense Report   [] Contacted physician office to confirm home medications  [] Medical Records received from another facility (list facility and place copy placed in chart)  []   was notified regarding changes to home med list via  on 7/8/2025 at the following time:        [] There are one or more home medications that need clarification before Medication Reconciliation can be marked completed. The Med List Status has been marked as In Progress.   To assist with Home Medication Reconciliation the following actions have been taken:    [] Family requested to bring medications in their original bottles to the hospital for verification  [] Family requested to call hospital with list of medications  [] Call to physicians off and left message (list physician and who they spoke to, date and time)  [] Request for medical records made   [] MAR from facility requested to be faxed over  [] Unable to complete due to patient condition  [] Oncoming nurse notified of incomplete med list for follow up  [] Other       *Effective communication is essential throughout this process. It is important for the nurse to document the progress of the med rec to keep team members informed. If any changes are made to the home medication list, the nurse is responsible for notifying the physician of the updated list 
never used smokeless tobacco. He reports that he does not drink alcohol and does not use drugs.     Family History:   Family History   Problem Relation Age of Onset    Cancer Mother     Cancer Father        Vitals:  /78   Pulse 82   Temp 98.1 °F (36.7 °C) (Oral)   Resp 18   Wt 93 kg (205 lb)   SpO2 98%   BMI 26.32 kg/m²   Temp (24hrs), Av.2 °F (36.8 °C), Min:98.1 °F (36.7 °C), Max:98.3 °F (36.8 °C)    Recent Labs     25  0804   POCGLU 128*       I/O (24Hr):    Intake/Output Summary (Last 24 hours) at 2025 1053  Last data filed at 2025 0800  Gross per 24 hour   Intake --   Output 400 ml   Net -400 ml       Labs:  Hematology:  Recent Labs     25  0444   WBC 6.3 6.5   RBC 4.23 4.48   HGB 12.3* 12.9*   HCT 36.1* 38.4*   MCV 85.3 85.7   MCH 29.1 28.8   MCHC 34.1 33.6   RDW 14.4 14.2    141   MPV 10.9 11.9   SEDRATE 2  --    CRP <3.0  --      Chemistry:  Recent Labs     25  0444    142   K 3.8 4.1   * 108*   CO2 25 25   GLUCOSE 137* 152*   BUN 12 13   CREATININE 0.9 1.0   ANIONGAP 9 9   LABGLOM >90 >90   CALCIUM 8.3* 8.6     Recent Labs     25  0804   LABA1C 7.0*  --    POCGLU  --  128*     ABG:  Lab Results   Component Value Date/Time    FIO2 NOT REPORTED 2016 02:47 AM     Lab Results   Component Value Date/Time    SPECIAL .FOOT  MID TARSAL SHAFT CLEARANCE FRAGMENTS   2025 12:22 PM     Lab Results   Component Value Date/Time    CULTURE NO GROWTH 2025 05:20 PM       Radiology:  XR FOOT RIGHT (MIN 3 VIEWS)  Result Date: 2025  Status post partial amputation of the proximal phalanx great toe. No radiographic evidence of osteomyelitis.       Physical Examination:        General appearance:  alert, cooperative and no distress  Mental Status:  oriented to person, place and time and normal affect  Lungs:  clear to auscultation bilaterally, normal effort  Heart:  regular rate and rhythm, no 
training    [facility] Daily [facility]  AM-PAC Daily Activity - Inpatient   How much help is needed for putting on and taking off regular lower body clothing?: A Little  How much help is needed for bathing (which includes washing, rinsing, drying)?: A Little  How much help is needed for toileting (which includes using toilet, bedpan, or urinal)?: A Little  How much help is needed for putting on and taking off regular upper body clothing?: A Little  How much help is needed for taking care of personal grooming?: A Little  How much help for eating meals?: None  [facility] Daily Activity Raw Score: 19  AM-PAC Inpatient ADL T-Scale Score : 40.22  ADL Inpatient CMS 0-100% Score: 42.8  ADL Inpatient CMS G-Code Modifier : CK    Minutes  OT Individual Minutes  Time In: 1445 (+10 mins for chart review & RN coordination)  Time Out: 1511  Minutes: 26+10=36  Tx Time: 10 minutes    Electronically signed by Carmen Hall OT on 7/8/25 at 3:45 PM EDT

## 2025-07-09 LAB — GLUCOSE BLD-MCNC: 156 MG/DL (ref 75–110)

## (undated) DEVICE — SOLUTION PREP PAINT POV IOD FOR SKIN MUCOUS MEM

## (undated) DEVICE — PRECISION THIN (7.0 X 0.38 X 18.5MM)

## (undated) DEVICE — SUTURE MONOCRYL SZ 3-0 L27IN ABSRB UD PS-2 3/8 CIR REV CUT NDL MCP427H

## (undated) DEVICE — SINGLE PORT MANIFOLD: Brand: NEPTUNE 2

## (undated) DEVICE — SUTURE PROL SZ 2-0 L30IN NONABSORBABLE BLU L26MM CT-1 1/2 8423H

## (undated) DEVICE — COVER LT HNDL BLU PLAS

## (undated) DEVICE — SPLINT QUICK STEP SCOTCHCAST 5 X 30

## (undated) DEVICE — SPONGE LAP W18XL18IN WHT COT 4 PLY FLD STRUNG RADPQ DISP ST 2 PER PACK

## (undated) DEVICE — DRESSING PETRO W3XL8IN OIL EMUL N ADH GZ KNIT IMPREG CELOS

## (undated) DEVICE — SET,IRRIGATION,CYSTO/TUR,90": Brand: MEDLINE

## (undated) DEVICE — GAUZE,PACKING STRIP,IODOFORM,1/2"X5YD,ST: Brand: CURAD

## (undated) DEVICE — SOLUTION IRRIG 1000ML 0.9% SOD CHL USP POUR PLAS BTL

## (undated) DEVICE — SOLUTION IV 1000ML 0.9% SOD CHL PH 5 INJ USP VIAFLX PLAS

## (undated) DEVICE — SOLUTION IRRIG 3000ML 0.9% SOD CHL USP UROMATIC PLAS CONT

## (undated) DEVICE — PREMIUM DRY TRAY LF: Brand: MEDLINE INDUSTRIES, INC.

## (undated) DEVICE — BANDAGE COBAN 4 IN COMPR W4INXL5YD FOAM COHESIVE QUIK STK SELF ADH SFT

## (undated) DEVICE — ELECTRODE ES L3IN S STL BLDE INSUL DISP VALLEYLAB EDGE

## (undated) DEVICE — PAD,ABDOMINAL,8"X7.5",ST,LF,20/BX: Brand: MEDLINE INDUSTRIES, INC.

## (undated) DEVICE — ST CHARLES PODIATRY: Brand: MEDLINE INDUSTRIES, INC.

## (undated) DEVICE — BLANKET WRM W29.9XL79.1IN UP BODY FORC AIR MISTRAL-AIR

## (undated) DEVICE — GLOVE ORANGE PI 8 1/2   MSG9085

## (undated) DEVICE — SUTURE PROL SZ 3-0 L18IN NONABSORBABLE BLU L24MM FS-1 3/8 8684G

## (undated) DEVICE — ZIMMER® STERILE DISPOSABLE TOURNIQUET CUFF WITH PLC, DUAL PORT, SINGLE BLADDER, 18 IN. (46 CM)

## (undated) DEVICE — PRECISION THIN (9.0 X 0.38 X 25.0MM)

## (undated) DEVICE — SUTURE PDS + SZ 2 0 L27IN ABSRB VLT L36MM CT 1 1 2 CIR PDP339H

## (undated) DEVICE — SOLUTION SCRB 4OZ 7.5% POVIDONE IOD ANTIMIC BTL